# Patient Record
Sex: FEMALE | Race: ASIAN | NOT HISPANIC OR LATINO | Employment: UNEMPLOYED | URBAN - METROPOLITAN AREA
[De-identification: names, ages, dates, MRNs, and addresses within clinical notes are randomized per-mention and may not be internally consistent; named-entity substitution may affect disease eponyms.]

---

## 2017-01-05 ENCOUNTER — OFFICE VISIT (OUTPATIENT)
Dept: MEDICAL GROUP | Facility: MEDICAL CENTER | Age: 47
End: 2017-01-05
Payer: COMMERCIAL

## 2017-01-05 VITALS
HEART RATE: 67 BPM | TEMPERATURE: 97.9 F | OXYGEN SATURATION: 98 % | SYSTOLIC BLOOD PRESSURE: 100 MMHG | DIASTOLIC BLOOD PRESSURE: 64 MMHG

## 2017-01-05 DIAGNOSIS — N95.1 PERIMENOPAUSAL VASOMOTOR SYMPTOMS: ICD-10-CM

## 2017-01-05 DIAGNOSIS — N91.2 AMENORRHEA: ICD-10-CM

## 2017-01-05 DIAGNOSIS — E55.9 VITAMIN D DEFICIENCY: ICD-10-CM

## 2017-01-05 LAB
INT CON NEG: NEGATIVE
INT CON POS: POSITIVE
POC URINE PREGNANCY TEST: NORMAL

## 2017-01-05 PROCEDURE — 99213 OFFICE O/P EST LOW 20 MIN: CPT | Performed by: FAMILY MEDICINE

## 2017-01-05 PROCEDURE — 81025 URINE PREGNANCY TEST: CPT | Performed by: FAMILY MEDICINE

## 2017-01-05 RX ORDER — NORETHINDRONE ACETATE AND ETHINYL ESTRADIOL .02; 1 MG/1; MG/1
1 TABLET ORAL DAILY
Qty: 28 TAB | Refills: 6 | Status: SHIPPED | OUTPATIENT
Start: 2017-01-05 | End: 2017-04-25

## 2017-01-05 RX ORDER — ERGOCALCIFEROL 1.25 MG/1
50000 CAPSULE ORAL
Qty: 4 CAP | Refills: 6 | Status: SHIPPED | OUTPATIENT
Start: 2017-01-05 | End: 2018-02-14

## 2017-01-05 NOTE — MR AVS SNAPSHOT
Mikal Murillo   2017 1:00 PM   Office Visit   MRN: [id number]    Department:  [facility]   Dept Phone:  [phone]    Description:  Female : 1970   Provider:  Maria Alejandra Orozco M.D.           Reason for Visit     Results lab work      Allergies as of 2017     Allergen Noted Reactions    Pcn [Penicillins] 2010         You were diagnosed with     Vitamin D deficiency   [3437170]       Amenorrhea   [381697]       Perimenopausal vasomotor symptoms   [077556]         Vital Signs     Blood Pressure Pulse Temperature Oxygen Saturation Last Menstrual Period Breastfeeding?    100/64 mmHg 67 36.6 °C (97.9 °F) 98% 2016 No    Smoking Status                   Never Smoker            Basic Information     Date Of Birth Sex Race Ethnicity Preferred Language    1970 Female  Non- English      Problem List              ICD-10-CM Priority Class Noted - Resolved    Perimenopausal vasomotor symptoms N95.1   2014 - Present    Irregular periods N92.6   2014 - Present    Hypothyroid E03.9   10/14/2015 - Present    Intrinsic eczema L20.84   2016 - Present    Allergic rhinitis due to pollen J30.1   2016 - Present    Encounter for gynecological examination without abnormal finding Z01.419   2016 - Present    Screening for viral disease Z11.59   2016 - Present    Vitamin D deficiency E55.9   2016 - Present    Rapid palpitations R00.2   2016 - Present    Leukopenia D72.819   2016 - Present    Urinary frequency R35.0   2016 - Present    Amenorrhea N91.2   2017 - Present      Health Maintenance        Date Due Completion Dates    IMM DTaP/Tdap/Td Vaccine (1 - Tdap) 2009 5/15/2009    PAP SMEAR 2017, 2014 (Prv Comp)    Override on 2014: Previously completed    MAMMOGRAM 2017, 2015, 2014, 2013            Results     POCT PREGNANCY      Component Value Standard Range & Units    POC  Urine Pregnancy Test NEG Negative    Internal Control Positive Positive     Internal Control Negative Negative                         Current Immunizations     Influenza Vaccine Quad Inj (Pf) 10/4/2016    MMR Vaccine 8/17/2016, 5/15/2009    TD Vaccine 5/15/2009    Tuberculin Skin Test 8/15/2016  4:44 PM    Varicella Vaccine Live 11/23/2009      Below and/or attached are the medications your provider expects you to take. Review all of your home medications and newly ordered medications with your provider and/or pharmacist. Follow medication instructions as directed by your provider and/or pharmacist. Please keep your medication list with you and share with your provider. Update the information when medications are discontinued, doses are changed, or new medications (including over-the-counter products) are added; and carry medication information at all times in the event of emergency situations     Allergies:  PCN - (reactions not documented)               Medications  Valid as of: January 05, 2017 -  3:41 PM    Generic Name Brand Name Tablet Size Instructions for use    Ergocalciferol (Cap) DRISDOL 14650 UNITS Take 1 Cap by mouth every 7 days.        Fluticasone Propionate (Ointment) CUTIVATE 0.005 % Apply to affected area BID until clear        Norethindrone Acet-Ethinyl Est (Tab) MICROGESTIN 1/20 1-20 MG-MCG Take 1 Tab by mouth every day.        .                 Medicines prescribed today were sent to:     [facility] [address] [place] [address]), [place] - [id number] () [place] [address]    Phone: [phone] Fax: [phone]    Open 24 Hours?: No      Medication refill instructions:       If your prescription bottle indicates you have medication refills left, it is not necessary to call your provider’s office. Please contact your pharmacy and they will refill your medication.    If your prescription bottle indicates you do not have any refills left, you may request refills at any time  through one of the following ways: The online Zixi system (except Urgent Care), by calling your provider’s office, or by asking your pharmacy to contact your provider’s office with a refill request. Medication refills are processed only during regular business hours and may not be available until the next business day. Your provider may request additional information or to have a follow-up visit with you prior to refilling your medication.   *Please Note: Medication refills are assigned a new Rx number when refilled electronically. Your pharmacy may indicate that no refills were authorized even though a new prescription for the same medication is available at the pharmacy. Please request the medicine by name with the pharmacy before contacting your provider for a refill.           Zixi Access Code: Activation code not generated  Current Zixi Status: Active

## 2017-01-06 NOTE — ASSESSMENT & PLAN NOTE
Patient reports that she's having severe hot flashes that are causing her to change her night clothes. She also sometimes gets them during the day. This is creating great anxiety and she would like to consider medications. When we recently checked her hormone levels they were still in a perimenopausal range. She reports that her last period was November 12, 16. She is having unprotected sex. She denies any breast tenderness or nausea.

## 2017-01-06 NOTE — PROGRESS NOTES
Subjective:     Chief Complaint   Patient presents with   • Results     lab work       Mikal Murillo is a 46 y.o. female here today for evaluation and management of:    Perimenopausal vasomotor symptoms  Patient reports that she's having severe hot flashes that are causing her to change her night clothes. She also sometimes gets them during the day. This is creating great anxiety and she would like to consider medications. When we recently checked her hormone levels they were still in a perimenopausal range. She reports that her last period was November 12, 16. She is having unprotected sex. She denies any breast tenderness or nausea.    Vitamin D deficiency  Patient is also here to review her recent labs. She has been taking over-the-counter vitamin D 2000 units daily. She does have some fatigue.       Allergies   Allergen Reactions   • Pcn [Penicillins]        Current medicines (including changes today)  Current Outpatient Prescriptions   Medication Sig Dispense Refill   • vitamin D, Ergocalciferol, (DRISDOL) 73688 UNITS Cap capsule Take 1 Cap by mouth every 7 days. 4 Cap 6   • norethindrone-ethinyl estradiol (MICROGESTIN 1/20) 1-20 MG-MCG per tablet Take 1 Tab by mouth every day. 28 Tab 6   • flyticasone (CUTIVATE) 0.005 % ointment Apply to affected area BID until clear 30 g 0     No current facility-administered medications for this visit.       She  has a past medical history of Perimenopausal vasomotor symptoms (5/6/2014) and Hypothyroid.    Patient Active Problem List    Diagnosis Date Noted   • Amenorrhea 01/05/2017   • Rapid palpitations 12/05/2016   • Leukopenia 12/05/2016   • Urinary frequency 12/05/2016   • Vitamin D deficiency 08/17/2016   • Intrinsic eczema 07/29/2016   • Allergic rhinitis due to pollen 07/29/2016   • Encounter for gynecological examination without abnormal finding 07/29/2016   • Screening for viral disease 07/29/2016   • Hypothyroid 10/14/2015   • Irregular periods 11/11/2014   •  Perimenopausal vasomotor symptoms 05/06/2014       ROS     No nausea or vomiting.  No chest pain or palpitations.  No cough or SOB.  No pain with urination or hematuria.  No black or bloody stools.       Objective:     Blood pressure 100/64, pulse 67, temperature 36.6 °C (97.9 °F), last menstrual period 11/12/2016, SpO2 98 %, not currently breastfeeding. There is no weight on file to calculate BMI.   Physical Exam:  Well developed, well nourished.  Alert, oriented in no acute distress.  Eye contact is good, speech goal directed, affect calm  Eyes: conjunctiva non-injected, sclera non-icteric.  Ears: Pinna normal. TM pearly gray.   Nose: Nares are patent.  Normal mucosa  Mouth: Oral mucous membranes pink and moist with no lesions.  Neck Supple.  No adenopathy or masses in the neck or supraclavicular regions. No thyromegaly  Lungs: clear to auscultation bilaterally with good excursion. No wheezes or rhonchi  CV: regular rate and rhythm. No murmur          Assessment and Plan:   The following treatment plan was discussed    1. Vitamin D deficiency  Start vitamin D 50,000 units weekly. Recheck labs in 6 months.  - vitamin D, Ergocalciferol, (DRISDOL) 07763 UNITS Cap capsule; Take 1 Cap by mouth every 7 days.  Dispense: 4 Cap; Refill: 6    2. Amenorrhea  Patient has skipped a period this may be perimenopause. We will get a pregnancy test to rule out that as an option.  - POCT PREGNANCY    3. Perimenopausal vasomotor symptoms  As patient's symptoms are causing her great distress and lowering her quality of life we will start her on low-dose oral contraceptives until she is fully through menopause.  - norethindrone-ethinyl estradiol (MICROGESTIN 1/20) 1-20 MG-MCG per tablet; Take 1 Tab by mouth every day.  Dispense: 28 Tab; Refill: 6    Any change or worsening of signs or symptoms, patient encouraged to follow-up or report to the emergency room for further evaluation. Patient understands and agrees.    Followup: Return in  about 6 months (around 7/5/2017).

## 2017-01-06 NOTE — ASSESSMENT & PLAN NOTE
Patient is also here to review her recent labs. She has been taking over-the-counter vitamin D 2000 units daily. She does have some fatigue.

## 2017-03-02 ENCOUNTER — OFFICE VISIT (OUTPATIENT)
Dept: MEDICAL GROUP | Facility: MEDICAL CENTER | Age: 47
End: 2017-03-02
Payer: COMMERCIAL

## 2017-03-02 VITALS
DIASTOLIC BLOOD PRESSURE: 64 MMHG | BODY MASS INDEX: 19.88 KG/M2 | HEIGHT: 62 IN | TEMPERATURE: 98.3 F | HEART RATE: 71 BPM | WEIGHT: 108 LBS | SYSTOLIC BLOOD PRESSURE: 98 MMHG | OXYGEN SATURATION: 98 %

## 2017-03-02 DIAGNOSIS — N95.1 PERIMENOPAUSAL VASOMOTOR SYMPTOMS: ICD-10-CM

## 2017-03-02 DIAGNOSIS — M79.644 FINGER PAIN, RIGHT: ICD-10-CM

## 2017-03-02 DIAGNOSIS — L20.84 INTRINSIC ECZEMA: ICD-10-CM

## 2017-03-02 PROCEDURE — 99213 OFFICE O/P EST LOW 20 MIN: CPT | Performed by: FAMILY MEDICINE

## 2017-03-02 RX ORDER — TRIAMCINOLONE ACETONIDE 40 MG/ML
40 INJECTION, SUSPENSION INTRA-ARTICULAR; INTRAMUSCULAR ONCE
Qty: 1 ML | Refills: 0 | OUTPATIENT
Start: 2017-03-02 | End: 2017-03-03

## 2017-03-02 RX ADMIN — TRIAMCINOLONE ACETONIDE 40 MG: 40 INJECTION, SUSPENSION INTRA-ARTICULAR; INTRAMUSCULAR at 09:43

## 2017-03-02 ASSESSMENT — PATIENT HEALTH QUESTIONNAIRE - PHQ9: CLINICAL INTERPRETATION OF PHQ2 SCORE: 0

## 2017-03-02 NOTE — MR AVS SNAPSHOT
"Mikal Murillo   3/2/2017 9:20 AM   Office Visit   MRN: [id number]    Department:  [facility]   Dept Phone:  [phone]    Description:  Female : 1970   Provider:  Maria Alejandra Orozco M.D.           Reason for Visit     Medication Management BCP    Finger Pain R hand middle finger    Rash on legs      Allergies as of 3/2/2017     Allergen Noted Reactions    Pcn [Penicillins] 2010         You were diagnosed with     Perimenopausal vasomotor symptoms   [941351]       Finger pain, right   [535818]       Intrinsic eczema   [2223241]         Vital Signs     Blood Pressure Pulse Temperature Height Weight Body Mass Index    98/64 mmHg 71 36.8 °C (98.3 °F) 1.577 m (5' 2.09\") 48.988 kg (108 lb) 19.70 kg/m2    Oxygen Saturation Last Menstrual Period Breastfeeding? Smoking Status          98% 2017 No Never Smoker         Basic Information     Date Of Birth Sex Race Ethnicity Preferred Language    1970 Female  Non- English      Problem List              ICD-10-CM Priority Class Noted - Resolved    Perimenopausal vasomotor symptoms N95.1   2014 - Present    Irregular periods N92.6   2014 - Present    Hypothyroid E03.9   10/14/2015 - Present    Intrinsic eczema L20.84   2016 - Present    Allergic rhinitis due to pollen J30.1   2016 - Present    Encounter for gynecological examination without abnormal finding Z01.419   2016 - Present    Screening for viral disease Z11.59   2016 - Present    Vitamin D deficiency E55.9   2016 - Present    Rapid palpitations R00.2   2016 - Present    Leukopenia D72.819   2016 - Present    Urinary frequency R35.0   2016 - Present    Amenorrhea N91.2   2017 - Present    Finger pain, right M79.644   3/2/2017 - Present      Health Maintenance        Date Due Completion Dates    IMM DTaP/Tdap/Td Vaccine (1 - Tdap) 2009 5/15/2009    PAP SMEAR 2017, 2014 (Prv Comp)    Override on " 5/6/2014: Previously completed    MAMMOGRAM 9/29/2017 9/29/2016, 9/28/2015, 9/23/2014, 8/30/2013            Current Immunizations     Influenza Vaccine Quad Inj (Pf) 10/4/2016    MMR Vaccine 8/17/2016, 5/15/2009    TD Vaccine 5/15/2009    Tuberculin Skin Test 8/15/2016  4:44 PM    Varicella Vaccine Live 11/23/2009      Below and/or attached are the medications your provider expects you to take. Review all of your home medications and newly ordered medications with your provider and/or pharmacist. Follow medication instructions as directed by your provider and/or pharmacist. Please keep your medication list with you and share with your provider. Update the information when medications are discontinued, doses are changed, or new medications (including over-the-counter products) are added; and carry medication information at all times in the event of emergency situations     Allergies:  PCN - (reactions not documented)               Medications  Valid as of: March 02, 2017 - 11:27 AM    Generic Name Brand Name Tablet Size Instructions for use    Ergocalciferol (Cap) DRISDOL 60320 UNITS Take 1 Cap by mouth every 7 days.        Fluticasone Propionate (Ointment) CUTIVATE 0.005 % Apply to affected area BID until clear        Norethindrone Acet-Ethinyl Est (Tab) MICROGESTIN 1/20 1-20 MG-MCG Take 1 Tab by mouth every day.        Triamcinolone Acetonide (Suspension) KENALOG-40 40 MG/ML 1 mL by Intramuscular route Once for 1 dose.        .                 Medicines prescribed today were sent to:     [facility] [address] [place] [address]), [place] - [address]    [address] () [place] [address]    Phone: [phone] Fax: [phone]    Open 24 Hours?: No      Medication refill instructions:       If your prescription bottle indicates you have medication refills left, it is not necessary to call your provider’s office. Please contact your pharmacy and they will refill your medication.    If your prescription bottle  indicates you do not have any refills left, you may request refills at any time through one of the following ways: The online Celect system (except Urgent Care), by calling your provider’s office, or by asking your pharmacy to contact your provider’s office with a refill request. Medication refills are processed only during regular business hours and may not be available until the next business day. Your provider may request additional information or to have a follow-up visit with you prior to refilling your medication.   *Please Note: Medication refills are assigned a new Rx number when refilled electronically. Your pharmacy may indicate that no refills were authorized even though a new prescription for the same medication is available at the pharmacy. Please request the medicine by name with the pharmacy before contacting your provider for a refill.           Celect Access Code: Activation code not generated  Current Celect Status: Active

## 2017-03-02 NOTE — ASSESSMENT & PLAN NOTE
She had spotting with the Microgestin, nipple tenderness and she's gaining weight.  She stopped them 2 weeks ago.  It was helping with the hot flashes.  She did not notice a difference with the estroven.

## 2017-03-02 NOTE — ASSESSMENT & PLAN NOTE
Two weeks ago she had severe pain in the 3rd finger of the right hand and it be came red.  It is feeling better now.  She denies any trauma.  No numbness or tingling.

## 2017-03-03 RX ORDER — TRIAMCINOLONE ACETONIDE 40 MG/ML
40 INJECTION, SUSPENSION INTRA-ARTICULAR; INTRAMUSCULAR ONCE
Status: COMPLETED | OUTPATIENT
Start: 2017-03-03 | End: 2017-03-02

## 2017-03-15 NOTE — PROGRESS NOTES
Subjective:     Chief Complaint   Patient presents with   • Medication Management     BCP   • Finger Pain     R hand middle finger   • Rash     on legs       Mikal Murillo is a 46 y.o. female here today for evaluation and management of:    Perimenopausal vasomotor symptoms  She had spotting with the Microgestin, nipple tenderness and she's gaining weight.  She stopped them 2 weeks ago.  It was helping with the hot flashes.  She did not notice a difference with the estroven.      Finger pain, right  Two weeks ago she had severe pain in the 3rd finger of the right hand and it be came red.  It is feeling better now.  She denies any trauma.  No numbness or tingling.    Intrinsic eczema  Patient has eczema on her arms and trunk that continue to itch and bother her.         Allergies   Allergen Reactions   • Pcn [Penicillins]        Current medicines (including changes today)  Current Outpatient Prescriptions   Medication Sig Dispense Refill   • vitamin D, Ergocalciferol, (DRISDOL) 29318 UNITS Cap capsule Take 1 Cap by mouth every 7 days. 4 Cap 6   • norethindrone-ethinyl estradiol (MICROGESTIN 1/20) 1-20 MG-MCG per tablet Take 1 Tab by mouth every day. 28 Tab 6   • flyticasone (CUTIVATE) 0.005 % ointment Apply to affected area BID until clear 30 g 0     No current facility-administered medications for this visit.       She  has a past medical history of Perimenopausal vasomotor symptoms (5/6/2014) and Hypothyroid.    Patient Active Problem List    Diagnosis Date Noted   • Finger pain, right 03/02/2017   • Amenorrhea 01/05/2017   • Rapid palpitations 12/05/2016   • Leukopenia 12/05/2016   • Urinary frequency 12/05/2016   • Vitamin D deficiency 08/17/2016   • Intrinsic eczema 07/29/2016   • Allergic rhinitis due to pollen 07/29/2016   • Encounter for gynecological examination without abnormal finding 07/29/2016   • Screening for viral disease 07/29/2016   • Hypothyroid 10/14/2015   • Irregular periods 11/11/2014   •  "Perimenopausal vasomotor symptoms 05/06/2014       ROS   No fever or chills.  No nausea or vomiting.  No chest pain or palpitations.  No cough or SOB.  No pain with urination or hematuria.  No black or bloody stools.       Objective:     Blood pressure 98/64, pulse 71, temperature 36.8 °C (98.3 °F), height 1.577 m (5' 2.09\"), weight 48.988 kg (108 lb), last menstrual period 02/16/2017, SpO2 98 %, not currently breastfeeding. Body mass index is 19.7 kg/(m^2).   Physical Exam:  Well developed, well nourished.  Alert, oriented in no acute distress.  Eye contact is good, speech goal directed, affect calm  Eyes: conjunctiva non-injected, sclera non-icteric.  Ears: Pinna normal. TM pearly gray.   Nose: Nares are patent.  Normal mucosa  Mouth: Oral mucous membranes pink and moist with no lesions.  Neck Supple.  No adenopathy or masses in the neck or supraclavicular regions. No thyromegaly  Lungs: clear to auscultation bilaterally with good excursion. No wheezes or rhonchi  CV: regular rate and rhythm. No murmur  Abdomen: soft, nontender, no masses or organomegaly.  No rebound or gaurding  Ext: no edema, color normal, vascularity normal, temperature normal  Skin- erythematous scaling lesion in patches      Assessment and Plan:   The following treatment plan was discussed    1. Perimenopausal vasomotor symptoms  Consider trial of estrogen therapy versus Effexor    2. Finger pain, right  Possible bursitis.  Consider referral    3. Intrinsic eczema    - triamcinolone acetonide (KENALOG-40) injection 40 mg; 1 mL by Intramuscular route Once.    Any change or worsening of signs or symptoms, patient encouraged to follow-up or report to the emergency room for further evaluation. Patient understands and agrees.    Followup: Return if symptoms worsen or fail to improve.             "

## 2017-04-25 ENCOUNTER — OFFICE VISIT (OUTPATIENT)
Dept: MEDICAL GROUP | Facility: MEDICAL CENTER | Age: 47
End: 2017-04-25
Payer: COMMERCIAL

## 2017-04-25 VITALS
TEMPERATURE: 97.5 F | BODY MASS INDEX: 18.61 KG/M2 | WEIGHT: 105 LBS | SYSTOLIC BLOOD PRESSURE: 100 MMHG | HEIGHT: 63 IN | DIASTOLIC BLOOD PRESSURE: 72 MMHG | OXYGEN SATURATION: 96 % | HEART RATE: 75 BPM

## 2017-04-25 DIAGNOSIS — N92.6 IRREGULAR MENSES: ICD-10-CM

## 2017-04-25 PROCEDURE — 99213 OFFICE O/P EST LOW 20 MIN: CPT | Performed by: FAMILY MEDICINE

## 2017-04-25 NOTE — PROGRESS NOTES
Subjective:     Chief Complaint   Patient presents with   • Menstrual Problem     for a couple of months       Mikal Murillo is a 47 y.o. female here today for evaluation and management of:    Irregular menses  Had menses 2/16, 3/10, 3/19. 4/10, 4/23 - Bleed 5-7 days and these were heavier than normal.  Only did one month of the Microgestin in January.  Not further hot flashes since that time. Patient is very anxious about her irregular periods. We have had multiple discussions about irregularity around the time of menopause. She denies any increased cramping. No increased breast tenderness, nipple discharge or masses.         Allergies   Allergen Reactions   • Pcn [Penicillins]        Current medicines (including changes today)  Current Outpatient Prescriptions   Medication Sig Dispense Refill   • vitamin D, Ergocalciferol, (DRISDOL) 91240 UNITS Cap capsule Take 1 Cap by mouth every 7 days. 4 Cap 6   • flyticasone (CUTIVATE) 0.005 % ointment Apply to affected area BID until clear 30 g 0     No current facility-administered medications for this visit.       She  has a past medical history of Perimenopausal vasomotor symptoms (5/6/2014) and Hypothyroid.    Patient Active Problem List    Diagnosis Date Noted   • Irregular menses 04/25/2017   • Finger pain, right 03/02/2017   • Amenorrhea 01/05/2017   • Rapid palpitations 12/05/2016   • Leukopenia 12/05/2016   • Urinary frequency 12/05/2016   • Vitamin D deficiency 08/17/2016   • Intrinsic eczema 07/29/2016   • Allergic rhinitis due to pollen 07/29/2016   • Encounter for gynecological examination without abnormal finding 07/29/2016   • Screening for viral disease 07/29/2016   • Hypothyroid 10/14/2015   • Irregular periods 11/11/2014   • Perimenopausal vasomotor symptoms 05/06/2014       ROS   No fever or chills.  No nausea or vomiting.  No chest pain or palpitations.  No cough or SOB.  No pain with urination or hematuria.  No black or bloody stools.       Objective:  "    Blood pressure 100/72, pulse 75, temperature 36.4 °C (97.5 °F), height 1.6 m (5' 3\"), weight 47.628 kg (105 lb), last menstrual period 04/23/2017, SpO2 96 %, not currently breastfeeding. Body mass index is 18.6 kg/(m^2).   Physical Exam:  Well developed, well nourished.  Alert, oriented in no acute distress.  Eye contact is good, speech goal directed, affect calm  Neck Supple.  No adenopathy or masses in the neck or supraclavicular regions. No thyromegaly  Lungs: clear to auscultation bilaterally with good excursion. No wheezes or rhonchi  CV: regular rate and rhythm. No murmur      Assessment and Plan:   The following treatment plan was discussed    1. Irregular menses  Given patient's anxiety over her gynecologic health will go ahead and refer her to gynecology for further evaluation and treatment. Patient agrees with this plan.  - REFERRAL TO GYNECOLOGY    Any change or worsening of signs or symptoms, patient encouraged to follow-up or report to the emergency room for further evaluation. Patient understands and agrees.    Followup: Return if symptoms worsen or fail to improve.             "

## 2017-04-25 NOTE — ASSESSMENT & PLAN NOTE
Had menses 2/16, 3/10, 3/19. 4/10, 4/23 - Bleed 5-7 days and these were heavier than normal.  Only did one month of the Microgestin in January.  Not further hot flashes since that time. Patient is very anxious about her irregular periods. We have had multiple discussions about irregularity around the time of menopause. She denies any increased cramping. No increased breast tenderness, nipple discharge or masses.

## 2017-04-25 NOTE — MR AVS SNAPSHOT
"Mikal Murillo   2017 8:40 AM   Office Visit   MRN: [id number]    Department:  [facility]   Dept Phone:  [phone]    Description:  Female : 1970   Provider:  Maria Alejandra Orozco M.D.           Reason for Visit     Menstrual Problem for a couple of months      Allergies as of 2017     Allergen Noted Reactions    Pcn [Penicillins] 2010         You were diagnosed with     Irregular menses   [917138]         Vital Signs     Blood Pressure Pulse Temperature Height Weight Body Mass Index    100/72 mmHg 75 36.4 °C (97.5 °F) 1.6 m (5' 3\") 47.628 kg (105 lb) 18.60 kg/m2    Oxygen Saturation Last Menstrual Period Breastfeeding? Smoking Status          96% 2017 No Never Smoker         Basic Information     Date Of Birth Sex Race Ethnicity Preferred Language    1970 Female  Non- English      Problem List              ICD-10-CM Priority Class Noted - Resolved    Perimenopausal vasomotor symptoms N95.1   2014 - Present    Irregular periods N92.6   2014 - Present    Hypothyroid E03.9   10/14/2015 - Present    Intrinsic eczema L20.84   2016 - Present    Allergic rhinitis due to pollen J30.1   2016 - Present    Encounter for gynecological examination without abnormal finding Z01.419   2016 - Present    Screening for viral disease Z11.59   2016 - Present    Vitamin D deficiency E55.9   2016 - Present    Rapid palpitations R00.2   2016 - Present    Leukopenia D72.819   2016 - Present    Urinary frequency R35.0   2016 - Present    Amenorrhea N91.2   2017 - Present    Finger pain, right M79.644   3/2/2017 - Present    Irregular menses N92.6   2017 - Present      Health Maintenance        Date Due Completion Dates    IMM DTaP/Tdap/Td Vaccine (1 - Tdap) 2009 5/15/2009    PAP SMEAR 2017, 2014 (Prv Comp)    Override on 2014: Previously completed    MAMMOGRAM 2017, 2015, " 9/23/2014, 8/30/2013            Current Immunizations     Influenza Vaccine Quad Inj (Pf) 10/4/2016    MMR Vaccine 8/17/2016, 5/15/2009    TD Vaccine 5/15/2009    Tuberculin Skin Test 8/15/2016  4:44 PM    Varicella Vaccine Live 11/23/2009      Below and/or attached are the medications your provider expects you to take. Review all of your home medications and newly ordered medications with your provider and/or pharmacist. Follow medication instructions as directed by your provider and/or pharmacist. Please keep your medication list with you and share with your provider. Update the information when medications are discontinued, doses are changed, or new medications (including over-the-counter products) are added; and carry medication information at all times in the event of emergency situations     Allergies:  PCN - (reactions not documented)               Medications  Valid as of: April 25, 2017 -  9:25 AM    Generic Name Brand Name Tablet Size Instructions for use    Ergocalciferol (Cap) DRISDOL 78655 UNITS Take 1 Cap by mouth every 7 days.        Fluticasone Propionate (Ointment) CUTIVATE 0.005 % Apply to affected area BID until clear        .                 Medicines prescribed today were sent to:     [facility] [address] [place] [address] (), [place] - [address]    [address] () [place] [address]    Phone: [phone] Fax: [phone]    Open 24 Hours?: No      Medication refill instructions:       If your prescription bottle indicates you have medication refills left, it is not necessary to call your provider’s office. Please contact your pharmacy and they will refill your medication.    If your prescription bottle indicates you do not have any refills left, you may request refills at any time through one of the following ways: The online [employer] system (except Urgent Care), by calling your provider’s office, or by asking your pharmacy to contact your provider’s office with a refill request.  Medication refills are processed only during regular business hours and may not be available until the next business day. Your provider may request additional information or to have a follow-up visit with you prior to refilling your medication.   *Please Note: Medication refills are assigned a new Rx number when refilled electronically. Your pharmacy may indicate that no refills were authorized even though a new prescription for the same medication is available at the pharmacy. Please request the medicine by name with the pharmacy before contacting your provider for a refill.        Referral     A referral request has been sent to our patient care coordination department. Please allow 3-5 business days for us to process this request and contact you either by phone or mail. If you do not hear from us by the 5th business day, please call us at [phone].           [employer] Access Code: Activation code not generated  Current [employer] Status: Active

## 2017-06-01 ENCOUNTER — OFFICE VISIT (OUTPATIENT)
Dept: MEDICAL GROUP | Facility: MEDICAL CENTER | Age: 47
End: 2017-06-01
Payer: COMMERCIAL

## 2017-06-01 VITALS
SYSTOLIC BLOOD PRESSURE: 100 MMHG | TEMPERATURE: 97.7 F | BODY MASS INDEX: 18.07 KG/M2 | HEART RATE: 70 BPM | DIASTOLIC BLOOD PRESSURE: 62 MMHG | OXYGEN SATURATION: 97 % | HEIGHT: 63 IN | WEIGHT: 102 LBS

## 2017-06-01 DIAGNOSIS — N92.6 IRREGULAR MENSES: ICD-10-CM

## 2017-06-01 DIAGNOSIS — R59.0 INGUINAL LYMPHADENOPATHY: ICD-10-CM

## 2017-06-01 DIAGNOSIS — B00.9 HERPES SIMPLEX INFECTION: ICD-10-CM

## 2017-06-01 PROBLEM — B00.4 HERPES SIMIAE INFECTION: Status: ACTIVE | Noted: 2017-06-01

## 2017-06-01 PROCEDURE — 99214 OFFICE O/P EST MOD 30 MIN: CPT | Performed by: FAMILY MEDICINE

## 2017-06-01 RX ORDER — VALACYCLOVIR HYDROCHLORIDE 1 G/1
1000 TABLET, FILM COATED ORAL 2 TIMES DAILY
Qty: 14 TAB | Refills: 3 | Status: SHIPPED | OUTPATIENT
Start: 2017-06-01 | End: 2018-02-14

## 2017-06-01 NOTE — PATIENT INSTRUCTIONS
Herpes Labialis  You have a fever blister or cold sore (herpes labialis). These painful, grouped sores are caused by one of the herpes viruses (HSV1 most commonly). They are usually found around the lips and mouth, but the same infection can also affect other areas on the face such as the nose and eyes. Herpes infections take about 10 days to heal. They often occur again and again in the same spot. Other symptoms may include numbness and tingling in the involved skin, achiness, fever, and swollen glands in the neck. Colds, emotional stress, injuries, or excess sunlight exposure all seem to make herpes reappear. Herpes lip infections are contagious. Direct contact with these sores can spread the infection. It can also be spread to other parts of your own body.  TREATMENT   Herpes labialis is usually self-limited and resolves within 1 week. To reduce pain and swelling, apply ice packs frequently to the sores or suck on popsicles or frozen juice bars. Antiviral medicine may be used by mouth to shorten the duration of the breakout. Avoid spreading the infection by washing your hands often. Be careful not to touch your eyes or genital areas after handling the infected blisters. Do not kiss or have other intimate contact with others. After the blisters are completely healed you may resume contact. Use sunscreen to lessen recurrences.   If this is your first infection with herpes, or if you have a severe or repeated infections, your caregiver may prescribe one of the anti-viral drugs to speed up the healing. If you have sun-related flare-ups despite the use of sunscreen, starting oral anti-viral medicine before a prolonged exposure (going skiing or to the beach) can prevent most episodes.   SEEK IMMEDIATE MEDICAL CARE IF:  · You develop a headache, sleepiness, high fever, vomiting, or severe weakness.  · You have eye irritation, pain, blurred vision or redness.  · You develop a prolonged infection not getting better in 10  days.     This information is not intended to replace advice given to you by your health care provider. Make sure you discuss any questions you have with your health care provider.     Document Released: 12/18/2006 Document Revised: 03/11/2013 Document Reviewed: 10/22/2010  [employer] Interactive Patient Education ©2016 [employer]vier Inc.

## 2017-06-01 NOTE — ASSESSMENT & PLAN NOTE
Patient had a normal period on 4/23/17 but has not bled since it.  She's scheduled to see the GYN in July.

## 2017-06-01 NOTE — MR AVS SNAPSHOT
"Mikal Murillo   2017 9:00 AM   Office Visit   MRN: [id number]    Department:  [facility]   Dept Phone:  [phone]    Description:  Female : 1970   Provider:  Maria Alejandra Orozco M.D.           Reason for Visit     Skin Lesion for 2 days    Groin Pain left side      Allergies as of 2017     Allergen Noted Reactions    Pcn [Penicillins] 2010         You were diagnosed with     Herpes simiae infection   [031047]       Inguinal lymphadenopathy   [[id number]]       Irregular menses   [504236]         Vital Signs     Blood Pressure Pulse Temperature Height Weight Body Mass Index    100/62 mmHg 70 36.5 °C (97.7 °F) 1.6 m (5' 2.99\") 46.267 kg (102 lb) 18.07 kg/m2    Oxygen Saturation Last Menstrual Period Breastfeeding? Smoking Status          97% 2017 No Never Smoker         Basic Information     Date Of Birth Sex Race Ethnicity Preferred Language    1970 Female  Non- English      Problem List              ICD-10-CM Priority Class Noted - Resolved    Perimenopausal vasomotor symptoms N95.1   2014 - Present    Hypothyroid E03.9   10/14/2015 - Present    Intrinsic eczema L20.84   2016 - Present    Allergic rhinitis due to pollen J30.1   2016 - Present    Encounter for gynecological examination without abnormal finding Z01.419   2016 - Present    Screening for viral disease Z11.59   2016 - Present    Vitamin D deficiency E55.9   2016 - Present    Rapid palpitations R00.2   2016 - Present    Leukopenia D72.819   2016 - Present    Urinary frequency R35.0   2016 - Present    Amenorrhea N91.2   2017 - Present    Finger pain, right M79.644   3/2/2017 - Present    Irregular menses N92.6   2017 - Present    Herpes simiae infection B00.4   2017 - Present    Inguinal lymphadenopathy R59.0   2017 - Present      Health Maintenance        Date Due Completion Dates    IMM DTaP/Tdap/Td Vaccine (1 - Tdap) 2009 5/15/2009   " PAP SMEAR 5/6/2017 5/6/2014, 5/6/2014 (Prv Comp)    Override on 5/6/2014: Previously completed    MAMMOGRAM 9/29/2017 9/29/2016, 9/28/2015, 9/23/2014, 8/30/2013            Current Immunizations     Influenza Vaccine Quad Inj (Pf) 10/4/2016    MMR Vaccine 8/17/2016, 5/15/2009    TD Vaccine 5/15/2009    Tuberculin Skin Test 8/15/2016  4:44 PM    Varicella Vaccine Live 11/23/2009      Below and/or attached are the medications your provider expects you to take. Review all of your home medications and newly ordered medications with your provider and/or pharmacist. Follow medication instructions as directed by your provider and/or pharmacist. Please keep your medication list with you and share with your provider. Update the information when medications are discontinued, doses are changed, or new medications (including over-the-counter products) are added; and carry medication information at all times in the event of emergency situations     Allergies:  PCN - (reactions not documented)               Medications  Valid as of: June 01, 2017 -  9:10 AM    Generic Name Brand Name Tablet Size Instructions for use    Ergocalciferol (Cap) DRISDOL 01172 UNITS Take 1 Cap by mouth every 7 days.        Fluticasone Propionate (Ointment) CUTIVATE 0.005 % Apply to affected area BID until clear        ValACYclovir HCl (Tab) VALTREX 1 GM Take 1 Tab by mouth 2 times a day.        .                 Medicines prescribed today were sent to:     [facility] [address] [place] [address]), [place] - [address]    [address] () [place] [address]    Phone: [phone] Fax: [phone]    Open 24 Hours?: No      Medication refill instructions:       If your prescription bottle indicates you have medication refills left, it is not necessary to call your provider’s office. Please contact your pharmacy and they will refill your medication.    If your prescription bottle indicates you do not have any refills left, you may request refills at any  time through one of the following ways: The online [employer] system (except Urgent Care), by calling your provider’s office, or by asking your pharmacy to contact your provider’s office with a refill request. Medication refills are processed only during regular business hours and may not be available until the next business day. Your provider may request additional information or to have a follow-up visit with you prior to refilling your medication.   *Please Note: Medication refills are assigned a new Rx number when refilled electronically. Your pharmacy may indicate that no refills were authorized even though a new prescription for the same medication is available at the pharmacy. Please request the medicine by name with the pharmacy before contacting your provider for a refill.        Instructions    Herpes Labialis  You have a fever blister or cold sore (herpes labialis). These painful, grouped sores are caused by one of the herpes viruses (HSV1 most commonly). They are usually found around the lips and mouth, but the same infection can also affect other areas on the face such as the nose and eyes. Herpes infections take about 10 days to heal. They often occur again and again in the same spot. Other symptoms may include numbness and tingling in the involved skin, achiness, fever, and swollen glands in the neck. Colds, emotional stress, injuries, or excess sunlight exposure all seem to make herpes reappear. Herpes lip infections are contagious. Direct contact with these sores can spread the infection. It can also be spread to other parts of your own body.  TREATMENT   Herpes labialis is usually self-limited and resolves within 1 week. To reduce pain and swelling, apply ice packs frequently to the sores or suck on popsicles or frozen juice bars. Antiviral medicine may be used by mouth to shorten the duration of the breakout. Avoid spreading the infection by washing your hands often. Be careful not to touch your eyes  or genital areas after handling the infected blisters. Do not kiss or have other intimate contact with others. After the blisters are completely healed you may resume contact. Use sunscreen to lessen recurrences.   If this is your first infection with herpes, or if you have a severe or repeated infections, your caregiver may prescribe one of the anti-viral drugs to speed up the healing. If you have sun-related flare-ups despite the use of sunscreen, starting oral anti-viral medicine before a prolonged exposure (going skiing or to the beach) can prevent most episodes.   SEEK IMMEDIATE MEDICAL CARE IF:  · You develop a headache, sleepiness, high fever, vomiting, or severe weakness.  · You have eye irritation, pain, blurred vision or redness.  · You develop a prolonged infection not getting better in 10 days.     This information is not intended to replace advice given to you by your health care provider. Make sure you discuss any questions you have with your health care provider.     Document Released: 12/18/2006 Document Revised: 03/11/2013 Document Reviewed: 10/22/2010  [employer] Interactive Patient Education ©2016 Elsevier Inc.              [employer] Access Code: Activation code not generated  Current [employer] Status: Active

## 2017-06-08 PROBLEM — B00.9 HERPES SIMPLEX INFECTION: Status: ACTIVE | Noted: 2017-06-01

## 2017-06-08 NOTE — ASSESSMENT & PLAN NOTE
Patient complains of a sore on her buttocks. She first noticed it one day ago. It is painful to touch. She's never had something like this before. She also noticed some swelling in her inguinal area since this sore appeared. She has had cold sores in the past but denies any genital herpes.

## 2017-06-08 NOTE — PROGRESS NOTES
Subjective:     Chief Complaint   Patient presents with   • Skin Lesion     for 2 days   • Groin Pain     left side       Mikal Murillo is a 47 y.o. female here today for evaluation and management of:    Irregular menses  Patient had a normal period on 4/23/17 but has not bled since it.  She's scheduled to see the GYN in July.    Herpes simplex infection  Patient complains of a sore on her buttocks. She first noticed it one day ago. It is painful to touch. She's never had something like this before. She also noticed some swelling in her inguinal area since this sore appeared. She has had cold sores in the past but denies any genital herpes.       Allergies   Allergen Reactions   • Pcn [Penicillins]        Current medicines (including changes today)  Current Outpatient Prescriptions   Medication Sig Dispense Refill   • valacyclovir (VALTREX) 1 GM Tab Take 1 Tab by mouth 2 times a day. 14 Tab 3   • vitamin D, Ergocalciferol, (DRISDOL) 34499 UNITS Cap capsule Take 1 Cap by mouth every 7 days. 4 Cap 6   • flyticasone (CUTIVATE) 0.005 % ointment Apply to affected area BID until clear 30 g 0     No current facility-administered medications for this visit.       She  has a past medical history of Perimenopausal vasomotor symptoms (5/6/2014) and Hypothyroid.    Patient Active Problem List    Diagnosis Date Noted   • Herpes simplex infection 06/01/2017   • Inguinal lymphadenopathy 06/01/2017   • Irregular menses 04/25/2017   • Finger pain, right 03/02/2017   • Amenorrhea 01/05/2017   • Rapid palpitations 12/05/2016   • Leukopenia 12/05/2016   • Urinary frequency 12/05/2016   • Vitamin D deficiency 08/17/2016   • Intrinsic eczema 07/29/2016   • Allergic rhinitis due to pollen 07/29/2016   • Encounter for gynecological examination without abnormal finding 07/29/2016   • Screening for viral disease 07/29/2016   • Hypothyroid 10/14/2015   • Perimenopausal vasomotor symptoms 05/06/2014       ROS   No fever or chills.  No nausea or  "vomiting.  No chest pain or palpitations.  No cough or SOB.  No pain with urination or hematuria.  No black or bloody stools.       Objective:     Blood pressure 100/62, pulse 70, temperature 36.5 °C (97.7 °F), height 1.6 m (5' 2.99\"), weight 46.267 kg (102 lb), last menstrual period 04/23/2017, SpO2 97 %, not currently breastfeeding. Body mass index is 18.07 kg/(m^2).   Physical Exam:  Well developed, well nourished.  Alert, oriented in no acute distress.  Eye contact is good, speech goal directed, affect calm  Eyes: conjunctiva non-injected, sclera non-icteric.  Neck Supple.  No adenopathy or masses in the neck or supraclavicular regions. No thyromegaly  Lungs: clear to auscultation bilaterally with good excursion. No wheezes or rhonchi  CV: regular rate and rhythm. No murmur  Abdomen: soft, nontender, no masses or organomegaly.  No rebound or guarding. There is bilateral inguinal lymphadenopathy that's tender to palpation.    Buttocks vesicular lesions and a patch above the anus with surrounding erythema and mild induration. No lymphatic streaking          Assessment and Plan:   The following treatment plan was discussed    1. Herpes simplex infection  Valtrex 1 g twice a day for 7 days. Disease prevention discussed. Call if not improving    2. Inguinal lymphadenopathy  Secondary to #1. Patient reassured    3. Irregular menses  Follow-up with gynecologist as scheduled.    Any change or worsening of signs or symptoms, patient encouraged to follow-up or report to the emergency room for further evaluation. Patient understands and agrees.    Followup: Return if symptoms worsen or fail to improve.             "

## 2017-07-27 DIAGNOSIS — E55.9 VITAMIN D DEFICIENCY: ICD-10-CM

## 2017-07-27 RX ORDER — ERGOCALCIFEROL 1.25 MG/1
CAPSULE ORAL
Refills: 0 | OUTPATIENT
Start: 2017-07-27

## 2017-10-02 ENCOUNTER — HOSPITAL ENCOUNTER (OUTPATIENT)
Dept: RADIOLOGY | Facility: MEDICAL CENTER | Age: 47
End: 2017-10-02
Attending: FAMILY MEDICINE
Payer: COMMERCIAL

## 2017-10-02 DIAGNOSIS — Z12.31 SCREENING MAMMOGRAM, ENCOUNTER FOR: ICD-10-CM

## 2017-10-02 PROCEDURE — G0202 SCR MAMMO BI INCL CAD: HCPCS

## 2017-11-14 ENCOUNTER — OFFICE VISIT (OUTPATIENT)
Dept: MEDICAL GROUP | Facility: MEDICAL CENTER | Age: 47
End: 2017-11-14
Payer: COMMERCIAL

## 2017-11-14 VITALS
DIASTOLIC BLOOD PRESSURE: 72 MMHG | RESPIRATION RATE: 14 BRPM | OXYGEN SATURATION: 96 % | HEIGHT: 63 IN | BODY MASS INDEX: 18.07 KG/M2 | WEIGHT: 102 LBS | SYSTOLIC BLOOD PRESSURE: 100 MMHG | TEMPERATURE: 97.2 F | HEART RATE: 68 BPM

## 2017-11-14 DIAGNOSIS — R05.9 COUGH: ICD-10-CM

## 2017-11-14 PROCEDURE — 99214 OFFICE O/P EST MOD 30 MIN: CPT | Performed by: FAMILY MEDICINE

## 2017-11-14 RX ORDER — OMEPRAZOLE 20 MG/1
20 CAPSULE, DELAYED RELEASE ORAL 2 TIMES DAILY
Qty: 60 CAP | Refills: 0 | Status: SHIPPED | OUTPATIENT
Start: 2017-11-14 | End: 2018-02-14

## 2017-11-14 NOTE — ASSESSMENT & PLAN NOTE
Patient with one-week history of nonproductive cough--but may have noticed this symptom in the past--is concerned that this could be something serious, particularly as she and her  will be travelling to [place] and [place] within the next two days.    Patient reviewed that this could be a chronic cough, and she does state that she has indigestion/heartburn at times. Patient interviewed that she was born in [place], was inoculated with likely BCG vaccination. Therefore, while this could be tuberculosis, is unlikely as patient is otherwise asymptomatic from tuberculosis standpoint (see review of systems as below).    ROS is positive for mild chest congestion/abdominal fullness, otherwise is negative for fevers, chills, nausea, emesis, hematemesis, coffee-ground emesis, sinus congestion, sinus headache, bilateral ear pain/congestion, myalgias, rash.

## 2017-11-15 NOTE — PROGRESS NOTES
Subjective:   Chief Complaint/History of Present Illness:  Mikal Murillo is a 47 y.o. female established patient who presents today toHave evaluation of acute cough:    Cough  Patient with one-week history of nonproductive cough--but may have noticed this symptom in the past--is concerned that this could be something serious, particularly as she and her  will be travelling to [place] and [place] within the next two days.    Patient reviewed that this could be a chronic cough, and she does state that she has indigestion/heartburn at times. Patient interviewed that she was born in [place], was inoculated with likely BCG vaccination. Therefore, while this could be tuberculosis, is unlikely as patient is otherwise asymptomatic from tuberculosis standpoint (see review of systems as below).    ROS is positive for mild chest congestion/abdominal fullness, otherwise is negative for fevers, chills, nausea, emesis, hematemesis, coffee-ground emesis, sinus congestion, sinus headache, bilateral ear pain/congestion, myalgias, rash.      Patient Active Problem List    Diagnosis Date Noted   • Cough 11/14/2017   • Herpes simplex infection 06/01/2017   • Inguinal lymphadenopathy 06/01/2017   • Irregular menses 04/25/2017   • Finger pain, right 03/02/2017   • Amenorrhea 01/05/2017   • Rapid palpitations 12/05/2016   • Leukopenia 12/05/2016   • Urinary frequency 12/05/2016   • Vitamin D deficiency 08/17/2016   • Intrinsic eczema 07/29/2016   • Allergic rhinitis due to pollen 07/29/2016   • Encounter for gynecological examination without abnormal finding 07/29/2016   • Screening for viral disease 07/29/2016   • Hypothyroid 10/14/2015   • Perimenopausal vasomotor symptoms 05/06/2014       Additional History:   Allergies:    Pcn [penicillins]     Current Medications:     Current Outpatient Prescriptions   Medication Sig Dispense Refill   • omeprazole (PRILOSEC) 20 MG delayed-release capsule Take 1 Cap by mouth 2 times a day. 60 Cap 0  "  • valacyclovir (VALTREX) 1 GM Tab Take 1 Tab by mouth 2 times a day. 14 Tab 3   • vitamin D, Ergocalciferol, (DRISDOL) 78100 UNITS Cap capsule Take 1 Cap by mouth every 7 days. 4 Cap 6   • flyticasone (CUTIVATE) 0.005 % ointment Apply to affected area BID until clear 30 g 0     No current facility-administered medications for this visit.         Social History:     Social History   Substance Use Topics   • Smoking status: Never Smoker   • Smokeless tobacco: Never Used   • Alcohol use 0.0 oz/week      Comment: rare       ROS:     - NOTE: All other systems reviewed and are negative, except as in HPI.     Objective:   Physical Exam:    Vitals: Blood pressure 100/72, pulse 68, temperature 36.2 °C (97.2 °F), resp. rate 14, height 1.6 m (5' 2.99\"), weight 46.3 kg (102 lb), SpO2 96 %, not currently breastfeeding.   BMI: Body mass index is 18.07 kg/m².   General/Constitutional: Vitals as above, Well nourished, well developed female in no acute distress   Head/Eyes: Head is grossly normal & atraumatic, bilateral conjunctivae not injected, bilateral EOMI, bilateral PERRL   ENT: Bilateral external ears grossly normal in appearance, Hearing grossly intact, bilateral external canals without cerumen impaction, bilateral TMs are able to the visualized without erythema/exudates/bulging, External nares normal in appearance and without discharge/bleeding, bilateral frontal/maxillary sinuses non-tender to palpation, posterior oropharynx without erythema/exudates and without airway obstruction   Respiratory: No respiratory distress, bilateral lungs are clear to ausculation in all lung fields (anterior/lateral/posterior), no wheezing/rhonchi/rales   Cardiovascular: Regular rate and rhythm without murmur/gallops/rubs, distal pulses are intact and equal bilaterally (radial, posterior tibial), no bilateral lower extremity edema   Gastrointestinal: Abdomen resonant to percussion, Bowel sounds present in all 4 quadrants, abdomen non-tender " to light and deep palpation including at epigastric area, and no Warren's sign/courvoissier tenderness, hepatosplenomegaly grossly absent, ventral/umbilical hernias absent   MSK: Gait grossly normal & not antalgic   Integumentary: No apparent rashes   Psych: Judgment grossly appropriate, no apparent depression/anxiety      Health Maintenance:      - Not addressed at this visit    Imaging/Labs:      - Not addressed at this visit    Assessment and Plan:   1. Cough  Uncontrolled, based on the discussion with patient, we have talked about the differential diagnosis as well as treatment plan, and have decided to trial her on a PPI and the assumption that this is secondary to gastroesophageal reflux disease.  We may consider evaluation with H. pylori stool admission and in the future if this persists, but are seeming at this point that this is secondary to acute anxiety/stress.   - omeprazole (PRILOSEC) 20 MG delayed-release capsule; Take 1 Cap by mouth 2 times a day.  Dispense: 60 Cap; Refill: 0        RTC: As needed, patient can my chart message me.    PLEASE NOTE: This dictation was created using voice recognition software. I have made every reasonable attempt to correct obvious errors, but I expect that there are errors of grammar and possibly content that I did not discover before finalizing the note.

## 2017-12-19 ENCOUNTER — OFFICE VISIT (OUTPATIENT)
Dept: MEDICAL GROUP | Facility: CLINIC | Age: 47
End: 2017-12-19
Payer: COMMERCIAL

## 2017-12-19 VITALS
RESPIRATION RATE: 16 BRPM | SYSTOLIC BLOOD PRESSURE: 100 MMHG | DIASTOLIC BLOOD PRESSURE: 60 MMHG | OXYGEN SATURATION: 96 % | BODY MASS INDEX: 18.66 KG/M2 | WEIGHT: 105.3 LBS | TEMPERATURE: 98.2 F | HEIGHT: 63 IN | HEART RATE: 72 BPM

## 2017-12-19 DIAGNOSIS — S16.1XXA STRAIN OF NECK MUSCLE, INITIAL ENCOUNTER: ICD-10-CM

## 2017-12-19 DIAGNOSIS — M54.2 NECK PAIN: ICD-10-CM

## 2017-12-19 LAB
INT CON NEG: NEGATIVE
INT CON POS: POSITIVE
POC URINE PREGNANCY TEST: NEGATIVE

## 2017-12-19 PROCEDURE — 81025 URINE PREGNANCY TEST: CPT | Performed by: INTERNAL MEDICINE

## 2017-12-19 PROCEDURE — 99213 OFFICE O/P EST LOW 20 MIN: CPT | Performed by: INTERNAL MEDICINE

## 2017-12-19 RX ORDER — ACETAMINOPHEN 325 MG/1
650 TABLET ORAL EVERY 4 HOURS PRN
COMMUNITY
End: 2018-02-14

## 2017-12-19 RX ORDER — OXYCODONE HYDROCHLORIDE AND ACETAMINOPHEN 5; 325 MG/1; MG/1
1 TABLET ORAL EVERY 8 HOURS PRN
Qty: 15 TAB | Refills: 0 | Status: SHIPPED | OUTPATIENT
Start: 2017-12-19 | End: 2017-12-24

## 2017-12-19 RX ORDER — METHYLPREDNISOLONE 4 MG/1
TABLET ORAL
Qty: 21 TAB | Refills: 0 | Status: SHIPPED | OUTPATIENT
Start: 2017-12-19 | End: 2018-02-14

## 2017-12-19 NOTE — PROGRESS NOTES
CC: Mikal Murillo is a 47 y.o. female is suffering from No chief complaint on file.        SUBJECTIVE:  1. Neck pain  Patient's here for follow-up has a history of neck pain after traveling back to [place].     2. Strain of neck muscle, initial encounter  Patient was strain associated with her neck etiology uncertain        Past social, family, history: ,  is translating  Social History   Substance Use Topics   • Smoking status: Never Smoker   • Smokeless tobacco: Never Used   • Alcohol use 0.0 oz/week      Comment: rare         MEDICATIONS:    Current Outpatient Prescriptions:   •  acetaminophen (TYLENOL) 325 MG Tab, Take 650 mg by mouth every four hours as needed., Disp: , Rfl:   •  oxycodone-acetaminophen (PERCOCET) 5-325 MG Tab, Take 1 Tab by mouth every 8 hours as needed (moderate pain) for up to 5 days. Will cause sedation, avoid driving, operating heavy machinery, and drinking alcohol, Disp: 15 Tab, Rfl: 0  •  MethylPREDNISolone (MEDROL DOSEPAK) 4 MG Tablet Therapy Pack, As directed on the packaging label., Disp: 21 Tab, Rfl: 0  •  omeprazole (PRILOSEC) 20 MG delayed-release capsule, Take 1 Cap by mouth 2 times a day., Disp: 60 Cap, Rfl: 0  •  valacyclovir (VALTREX) 1 GM Tab, Take 1 Tab by mouth 2 times a day., Disp: 14 Tab, Rfl: 3  •  vitamin D, Ergocalciferol, (DRISDOL) 24631 UNITS Cap capsule, Take 1 Cap by mouth every 7 days., Disp: 4 Cap, Rfl: 6  •  flyticasone (CUTIVATE) 0.005 % ointment, Apply to affected area BID until clear, Disp: 30 g, Rfl: 0    PROBLEMS:  Patient Active Problem List    Diagnosis Date Noted   • Cough 11/14/2017   • Herpes simplex infection 06/01/2017   • Inguinal lymphadenopathy 06/01/2017   • Irregular menses 04/25/2017   • Finger pain, right 03/02/2017   • Amenorrhea 01/05/2017   • Rapid palpitations 12/05/2016   • Leukopenia 12/05/2016   • Urinary frequency 12/05/2016   • Vitamin D deficiency 08/17/2016   • Intrinsic eczema 07/29/2016   • Allergic rhinitis due to  "pollen 07/29/2016   • Encounter for gynecological examination without abnormal finding 07/29/2016   • Screening for viral disease 07/29/2016   • Hypothyroid 10/14/2015   • Perimenopausal vasomotor symptoms 05/06/2014       REVIEW OF SYSTEMS:  Gen.:  No Nausea, Vomiting, fever, Chills.  Heart: No chest pain.  Lungs:  No shortness of Breath.  Psychological: Osvaldo unusual Anxiety depression     PHYSICAL EXAM   Constitutional: Alert, cooperative, not in acute distress.  Cardiovascular:  Rate Rhythm is regular without murmurs rubs clicks.     Thorax & Lungs: Clear to auscultation, no wheezing, rhonchi, or rales  HENT: Normocephalic, Atraumatic.  Eyes: PERRLA, EOMI, Conjunctiva normal.   Neck: Trachia is midline no swelling of the thyroid.   Lymphatic: No lymphadenopathy noted.   Musculoskeletal:Patient with pain to palpation approximately the C2 aspect at the right side of the neck  Neurologic: Alert & oriented x 3, cranial nerves II through XII are intact, Normal motor function, Normal sensory function, No focal deficits noted.   Psychiatric: Affect normal, Judgment normal, Mood normal.     VITAL SIGNS:/60   Pulse 72   Temp 36.8 °C (98.2 °F)   Resp 16   Ht 1.6 m (5' 3\")   Wt 47.8 kg (105 lb 4.8 oz)   SpO2 96%   Breastfeeding? No   BMI 18.65 kg/m²     Labs: Reviewed    Assessment:                                                     Plan:    1. Neck pain  Neck pain etiology uncertain suspect simple sprain strain  - POCT Pregnancy  - oxycodone-acetaminophen (PERCOCET) 5-325 MG Tab; Take 1 Tab by mouth every 8 hours as needed (moderate pain) for up to 5 days. Will cause sedation, avoid driving, operating heavy machinery, and drinking alcohol  Dispense: 15 Tab; Refill: 0  - MethylPREDNISolone (MEDROL DOSEPAK) 4 MG Tablet Therapy Pack; As directed on the packaging label.  Dispense: 21 Tab; Refill: 0  - DX-CERVICAL SPINE-2 OR 3 VIEWS; Future  - REFERRAL TO PHYSICAL THERAPY Reason for Therapy: " Eval/Treat/Report    2. Strain of neck muscle, initial encounter  Treated as above  - POCT Pregnancy  - oxycodone-acetaminophen (PERCOCET) 5-325 MG Tab; Take 1 Tab by mouth every 8 hours as needed (moderate pain) for up to 5 days. Will cause sedation, avoid driving, operating heavy machinery, and drinking alcohol  Dispense: 15 Tab; Refill: 0  - MethylPREDNISolone (MEDROL DOSEPAK) 4 MG Tablet Therapy Pack; As directed on the packaging label.  Dispense: 21 Tab; Refill: 0    Patient was instructed through her  to use the Medrol Dosepak also Percocet, if not improved significantly x-rays and then follow-up with physician if significant improvement but still residual pain physical therapy would be appropriate if a 50% decrease in pain is achieved

## 2018-01-03 ENCOUNTER — OFFICE VISIT (OUTPATIENT)
Dept: MEDICAL GROUP | Facility: MEDICAL CENTER | Age: 48
End: 2018-01-03
Payer: COMMERCIAL

## 2018-01-03 VITALS
HEIGHT: 63 IN | OXYGEN SATURATION: 95 % | DIASTOLIC BLOOD PRESSURE: 60 MMHG | HEART RATE: 66 BPM | BODY MASS INDEX: 18.61 KG/M2 | SYSTOLIC BLOOD PRESSURE: 100 MMHG | TEMPERATURE: 97.6 F | WEIGHT: 105 LBS | RESPIRATION RATE: 16 BRPM

## 2018-01-03 DIAGNOSIS — L20.84 INTRINSIC ECZEMA: ICD-10-CM

## 2018-01-03 DIAGNOSIS — M54.2 NECK PAIN, ACUTE: ICD-10-CM

## 2018-01-03 PROCEDURE — 99214 OFFICE O/P EST MOD 30 MIN: CPT | Performed by: FAMILY MEDICINE

## 2018-01-03 RX ORDER — CYCLOBENZAPRINE HCL 5 MG
5 TABLET ORAL NIGHTLY PRN
Qty: 30 TAB | Refills: 0 | Status: SHIPPED | OUTPATIENT
Start: 2018-01-03 | End: 2018-02-14

## 2018-01-03 RX ORDER — CLOTRIMAZOLE AND BETAMETHASONE DIPROPIONATE 10; .64 MG/G; MG/G
CREAM TOPICAL
Qty: 60 G | Refills: 1 | Status: SHIPPED | OUTPATIENT
Start: 2018-01-03 | End: 2018-02-14

## 2018-01-03 NOTE — PATIENT INSTRUCTIONS
"Soft Tissue Injury of the Neck  A soft tissue injury of the neck may be either blunt or penetrating. A blunt injury does not break the skin. A penetrating injury breaks the skin, creating an open wound. Blunt injuries may happen in several ways. Most involve some type of direct blow to the neck. This can cause serious injury to the windpipe, voice box, cervical spine, or esophagus. In some cases, the injury to the soft tissue can also result in a break (fracture) of the cervical spine.   Soft tissue injuries of the neck require immediate medical care. Sometimes, you may not notice the signs of injury right away. You may feel fine at first, but the swelling may eventually close off your airway. This could result in a significant or life-threatening injury. This is rare, but it is important to keep in mind with any injury to the neck.   CAUSES   Causes of blunt injury may include:  · \"Clothesline\" injuries. This happens when someone is moving at high speed and runs into a clothesline, outstretched arm, or similar object. This results in a direct injury to the front of the neck. If the airway is blocked, it can cause suffocation due to lack of oxygen (asphyxiation) or even instant death.  · High-energy trauma. This includes injuries from motor vehicle crashes, falling from a great height, or heavy objects falling onto the neck.  · Sports-related injuries. Injury to the windpipe and voice box can result from being struck by another player or being struck by an object, such as a baseball, hockey stick, or an outstretched arm.  · Strangulation. This type of injury may cause skin trauma, hoarseness of voice, or broken cartilage in the voice box or windpipe. It may also cause a serious airway problem.  SYMPTOMS   · Bruising.  · Pain and tenderness in the neck.  · Swelling of the neck and face.  · Hoarseness of voice.  · Pain or difficulty with swallowing.  · Drooling or inability to swallow.  · Trouble breathing. This may " become worse when lying flat.  · Coughing up blood.  · High-pitched, harsh, vibratory noise due to partial obstruction of the windpipe (stridor).  · Swelling of the upper arms.  · Windpipe that appears to be pushed off to one side.  · Air in the tissues under the skin of the neck or chest (subcutaneous emphysema). This usually indicates a problem with the normal airway and is a medical emergency.  DIAGNOSIS   · If possible, your caregiver may ask about the details of how the injury occurred. A detailed exam can help to identify specific areas of the neck that are injured.  · Your caregiver may ask for tests to rule out injury of the voice box, airway, or esophagus. This may include X-rays, ultrasounds, CT scans, or MRI scans, depending on the severity of your injury.  TREATMENT   If you have an injury to your windpipe or voice box, immediate medical care is required. In almost all cases, hospitalization is necessary. For injuries that do not appear to require surgery, it is helpful to have medical observation for 24 hours. You may be asked to do one or more of the following:  · Rest your voice.  · Bed rest.  · Limit your diet, depending on the extent of the injury. Follow your caregiver's dietary guidelines. Often, only fluids and soft foods are recommended.  · Keep your head raised.  · Breathe humidified air.  · Take medicines to control infection, reduce swelling, and reduce normal stomach acid. You may also need pain medicine, depending on your injury.  For injuries that appear to require surgery, you will need to stay in the hospital. The exact type of procedure needed will depend on your exact injury or injuries.   HOME CARE INSTRUCTIONS   · If the skin was broken, keep the wound area clean and dry. Wear your bandage (dressing) and care for your wound as instructed.  · Follow your caregiver's advice about your diet.  · Follow your caregiver's advice about use of your voice.  · Take medicines as  directed.  · Keep your head and neck at least partially raised (elevated) while recovering. This should also be done while sleeping.  SEEK MEDICAL CARE IF:   · Your voice becomes weaker.  · Your swelling or bruising is not improving as expected. Typically, this takes several days to improve.  · You feel that you are having problems with medicines prescribed.  · You have drainage from the injury site. This may be a sign that your wound is not healing properly or is infected.  · You develop increasing pain or difficulty while swallowing.  · You develop an oral temperature of 102° F (38.9° C) or higher.  SEEK IMMEDIATE MEDICAL CARE IF:   · You cough up blood.  · You develop sudden trouble breathing.  · You cannot tolerate your oral medicines, or you are unable to swallow.  · You develop drooling.  · You have new or worsening vomiting.  · You develop sudden, new swelling of the neck or face.  · You have an oral temperature above 102° F (38.9° C), not controlled by medicine.  MAKE SURE YOU:  · Understand these instructions.  · Will watch your condition.  · Will get help right away if you are not doing well or get worse.     This information is not intended to replace advice given to you by your health care provider. Make sure you discuss any questions you have with your health care provider.     Document Released: 03/26/2009 Document Revised: 03/11/2013 Document Reviewed: 03/05/2012  [employer] Patient Education ©2016 [employer]

## 2018-01-03 NOTE — ASSESSMENT & PLAN NOTE
Patient was traveling between [facility] and [facility] and lifting heavy luggage.  She went to Urgent Care and was given steroids that helped immensely, she is still having a little bit of pain. It's primarily on the right-hand side. It hurts more with movement and is a little stiff. She hasn't had any fever or chills. No headaches. She denies any numbness or tingling.

## 2018-01-05 NOTE — ASSESSMENT & PLAN NOTE
Patient has a reoccurrence of her scaling rash at the base of her scalp. She has had cream in the past that has worked well. It had almost totally resolved over the last year but recently recurred. It is quite itchy.

## 2018-01-05 NOTE — PROGRESS NOTES
Subjective:     Chief Complaint   Patient presents with   • Follow-Up     Neck and Back pain,    • Eczema     Back of neck,        Mikal Murillo is a 47 y.o. female here today for evaluation and management of:    Neck pain, acute  Patient was traveling between [facility] and [facility] and lifting heavy luggage.  She went to Urgent Care and was given steroids that helped immensely, she is still having a little bit of pain. It's primarily on the right-hand side. It hurts more with movement and is a little stiff. She hasn't had any fever or chills. No headaches. She denies any numbness or tingling.    Intrinsic eczema  Patient has a reoccurrence of her scaling rash at the base of her scalp. She has had cream in the past that has worked well. It had almost totally resolved over the last year but recently recurred. It is quite itchy.       Allergies   Allergen Reactions   • Pcn [Penicillins]        Current medicines (including changes today)  Current Outpatient Prescriptions   Medication Sig Dispense Refill   • clotrimazole-betamethasone (LOTRISONE) 1-0.05 % Cream Apply to affected area BID until clear 60 g 1   • cyclobenzaprine (FLEXERIL) 5 MG tablet Take 1 Tab by mouth at bedtime as needed for Muscle Spasms. 30 Tab 0   • acetaminophen (TYLENOL) 325 MG Tab Take 650 mg by mouth every four hours as needed.     • MethylPREDNISolone (MEDROL DOSEPAK) 4 MG Tablet Therapy Pack As directed on the packaging label. 21 Tab 0   • omeprazole (PRILOSEC) 20 MG delayed-release capsule Take 1 Cap by mouth 2 times a day. 60 Cap 0   • valacyclovir (VALTREX) 1 GM Tab Take 1 Tab by mouth 2 times a day. 14 Tab 3   • vitamin D, Ergocalciferol, (DRISDOL) 27639 UNITS Cap capsule Take 1 Cap by mouth every 7 days. 4 Cap 6   • flyticasone (CUTIVATE) 0.005 % ointment Apply to affected area BID until clear 30 g 0     No current facility-administered medications for this visit.        She  has a past medical history of Hypothyroid and Perimenopausal vasomotor  "symptoms (5/6/2014).    Patient Active Problem List    Diagnosis Date Noted   • Neck pain, acute 01/03/2018   • Cough 11/14/2017   • Herpes simplex infection 06/01/2017   • Inguinal lymphadenopathy 06/01/2017   • Irregular menses 04/25/2017   • Finger pain, right 03/02/2017   • Amenorrhea 01/05/2017   • Rapid palpitations 12/05/2016   • Leukopenia 12/05/2016   • Urinary frequency 12/05/2016   • Vitamin D deficiency 08/17/2016   • Intrinsic eczema 07/29/2016   • Allergic rhinitis due to pollen 07/29/2016   • Encounter for gynecological examination without abnormal finding 07/29/2016   • Screening for viral disease 07/29/2016   • Hypothyroid 10/14/2015   • Perimenopausal vasomotor symptoms 05/06/2014       ROS   No fever or chills.  No nausea or vomiting.  No chest pain or palpitations.  No cough or SOB.  No pain with urination or hematuria.  No black or bloody stools.       Objective:     Blood pressure 100/60, pulse 66, temperature 36.4 °C (97.6 °F), resp. rate 16, height 1.6 m (5' 3\"), weight 47.6 kg (105 lb), SpO2 95 %. Body mass index is 18.6 kg/m².   Physical Exam:  Well developed, well nourished.  Alert, oriented in no acute distress.  Eye contact is good, speech goal directed, affect calm  Eyes: conjunctiva non-injected, sclera non-icteric.  Ears: Pinna normal. TM pearly gray.   Nose: Nares are patent.  Normal mucosa  Mouth: Oral mucous membranes pink and moist with no lesions.  Neck Supple.  No adenopathy or masses in the neck or supraclavicular regions. No thyromegaly. Tenderness to palpation of the strap muscles of the right extending down to the trapezius. Muscle spasm is present. There is full range of motion. Negative Spurling  Lungs: clear to auscultation bilaterally with good excursion. No wheezes or rhonchi  CV: regular rate and rhythm. No murmur  Skin: Erythematous, scaling plaque-like rash at the base of the school with excoriated area    Assessment and Plan:   The following treatment plan was " discussed    1. Neck pain, acute  Refer to physical therapy. Flexeril as needed for muscle spasm. Ice  - REFERRAL TO PHYSICAL THERAPY Reason for Therapy: Eval/Treat/Report  - cyclobenzaprine (FLEXERIL) 5 MG tablet; Take 1 Tab by mouth at bedtime as needed for Muscle Spasms.  Dispense: 30 Tab; Refill: 0    2. Intrinsic eczema  Lotrisone as directed. Refer to dermatology if not improving.  - clotrimazole-betamethasone (LOTRISONE) 1-0.05 % Cream; Apply to affected area BID until clear  Dispense: 60 g; Refill: 1    Any change or worsening of signs or symptoms, patient encouraged to follow-up or report to the emergency room for further evaluation. Patient understands and agrees.    Followup: Return if symptoms worsen or fail to improve.

## 2018-02-14 ENCOUNTER — OFFICE VISIT (OUTPATIENT)
Dept: INTERNAL MEDICINE | Facility: MEDICAL CENTER | Age: 48
End: 2018-02-14
Payer: COMMERCIAL

## 2018-02-14 VITALS
BODY MASS INDEX: 18.78 KG/M2 | SYSTOLIC BLOOD PRESSURE: 105 MMHG | HEART RATE: 67 BPM | OXYGEN SATURATION: 99 % | DIASTOLIC BLOOD PRESSURE: 72 MMHG | WEIGHT: 106 LBS | TEMPERATURE: 97.3 F | HEIGHT: 63 IN

## 2018-02-14 DIAGNOSIS — F41.9 ANXIETY: ICD-10-CM

## 2018-02-14 DIAGNOSIS — N95.1 PERIMENOPAUSAL VASOMOTOR SYMPTOMS: ICD-10-CM

## 2018-02-14 DIAGNOSIS — R00.2 PALPITATIONS: ICD-10-CM

## 2018-02-14 LAB — GLUCOSE BLD-MCNC: 84 MG/DL (ref 70–100)

## 2018-02-14 PROCEDURE — 99214 OFFICE O/P EST MOD 30 MIN: CPT | Mod: GC | Performed by: INTERNAL MEDICINE

## 2018-02-14 PROCEDURE — 93000 ELECTROCARDIOGRAM COMPLETE: CPT | Performed by: INTERNAL MEDICINE

## 2018-02-14 PROCEDURE — 82962 GLUCOSE BLOOD TEST: CPT | Performed by: INTERNAL MEDICINE

## 2018-02-14 RX ORDER — LORAZEPAM 0.5 MG/1
0.5 TABLET ORAL NIGHTLY PRN
Qty: 10 TAB | Refills: 0 | Status: SHIPPED | OUTPATIENT
Start: 2018-02-14 | End: 2018-02-26

## 2018-02-14 ASSESSMENT — PATIENT HEALTH QUESTIONNAIRE - PHQ9: CLINICAL INTERPRETATION OF PHQ2 SCORE: 0

## 2018-02-14 NOTE — PATIENT INSTRUCTIONS
Perimenopause  Perimenopause is the time when your body begins to move into the menopause (no menstrual period for 12 straight months). It is a natural process. Perimenopause can begin 2-8 years before the menopause and usually lasts for 1 year after the menopause. During this time, your ovaries may or may not produce an egg. The ovaries vary in their production of estrogen and progesterone hormones each month. This can cause irregular menstrual periods, difficulty getting pregnant, vaginal bleeding between periods, and uncomfortable symptoms.  CAUSES  · Irregular production of the ovarian hormones, estrogen and progesterone, and not ovulating every month.  · Other causes include:  ¨ Tumor of the pituitary gland in the brain.  ¨ Medical disease that affects the ovaries.  ¨ Radiation treatment.  ¨ Chemotherapy.  ¨ Unknown causes.  ¨ Heavy smoking and excessive alcohol intake can bring on perimenopause sooner.  SIGNS AND SYMPTOMS   · Hot flashes.  · Night sweats.  · Irregular menstrual periods.  · Decreased sex drive.  · Vaginal dryness.  · Headaches.  · Mood swings.  · Depression.  · Memory problems.  · Irritability.  · Tiredness.  · Weight gain.  · Trouble getting pregnant.  · The beginning of losing bone cells (osteoporosis).  · The beginning of hardening of the arteries (atherosclerosis).  DIAGNOSIS   Your health care provider will make a diagnosis by analyzing your age, menstrual history, and symptoms. He or she will do a physical exam and note any changes in your body, especially your female organs. Female hormone tests may or may not be helpful depending on the amount of female hormones you produce and when you produce them. However, other hormone tests may be helpful to rule out other problems.  TREATMENT   In some cases, no treatment is needed. The decision on whether treatment is necessary during the perimenopause should be made by you and your health care provider based on how the symptoms are affecting you  and your lifestyle. Various treatments are available, such as:  · Treating individual symptoms with a specific medicine for that symptom.  · Herbal medicines that can help specific symptoms.  · Counseling.  · Group therapy.  HOME CARE INSTRUCTIONS   · Keep track of your menstrual periods (when they occur, how heavy they are, how long between periods, and how long they last) as well as your symptoms and when they started.  · Only take over-the-counter or prescription medicines as directed by your health care provider.  · Sleep and rest.  · Exercise.  · Eat a diet that contains calcium (good for your bones) and soy (acts like the estrogen hormone).  · Do not smoke.  · Avoid alcoholic beverages.  · Take vitamin supplements as recommended by your health care provider. Taking vitamin E may help in certain cases.  · Take calcium and vitamin D supplements to help prevent bone loss.  · Group therapy is sometimes helpful.  · Acupuncture may help in some cases.  SEEK MEDICAL CARE IF:   · You have questions about any symptoms you are having.  · You need a referral to a specialist (gynecologist, psychiatrist, or psychologist).  SEEK IMMEDIATE MEDICAL CARE IF:   · You have vaginal bleeding.  · Your period lasts longer than 8 days.  · Your periods are recurring sooner than 21 days.  · You have bleeding after intercourse.  · You have severe depression.  · You have pain when you urinate.  · You have severe headaches.  · You have vision problems.     This information is not intended to replace advice given to you by your health care provider. Make sure you discuss any questions you have with your health care provider.     Document Released: 01/25/2006 Document Revised: 01/08/2016 Document Reviewed: 07/17/2014  [employer] Interactive Patient Education ©2016 [employer]

## 2018-02-14 NOTE — PROGRESS NOTES
New Patient to Establish    Reason to establish: New patient to establish primary care    CC: Chest pain, and anxiety      HPI: Patient is a pleasant 47-year-old Chinese American accompanied with her  who is a [place]ese American, is here for an acute short visit as she could not get appointment with her primary care doctor.    She reports an episode of severe anxiety yesterday night while going to bed associated with severe chest pain which subsided on its own. She has been getting severe  episodes of intense anxiety and feeling that she will die with heart attack , episodes have been happening for the last 2 years, and has repeatedly been to different urgent care centers where cardiac workup was negative for any acute coronary syndrome. She had her last menstrual period 5 months ago prior to that her menstrual cycles were regular.    She declined any episodes of pounding headache, loss of vision, loss of muscle tone or weakness of the body. There is no family history of depression or anxiety,  she does not have any suicidal or homicidal ideations. She is a nonsmoker nonalcoholic does not abuse any drugs , she is  and has one child from her previous marriage in [place]. Currently lives with her [place]ese  who has 3 other kids from his previous marriage.      Patient Active Problem List    Diagnosis Date Noted   • Anxiety disorder 02/14/2018   • Neck pain, acute 01/03/2018   • Cough 11/14/2017   • Herpes simplex infection 06/01/2017   • Inguinal lymphadenopathy 06/01/2017   • Irregular menses 04/25/2017   • Finger pain, right 03/02/2017   • Amenorrhea 01/05/2017   • Rapid palpitations 12/05/2016   • Leukopenia 12/05/2016   • Urinary frequency 12/05/2016   • Vitamin D deficiency 08/17/2016   • Intrinsic eczema 07/29/2016   • Allergic rhinitis due to pollen 07/29/2016   • Encounter for gynecological examination without abnormal finding 07/29/2016   • Screening for viral disease 07/29/2016   •  Hypothyroid 10/14/2015   • Perimenopausal vasomotor symptoms 05/06/2014       Past Medical History:   Diagnosis Date   • Hypothyroid    • Perimenopausal vasomotor symptoms 5/6/2014       Current Outpatient Prescriptions   Medication Sig Dispense Refill   • LORazepam (ATIVAN) 0.5 MG Tab Take 1 Tab by mouth at bedtime as needed for Anxiety for up to 12 days. 10 Tab 0     No current facility-administered medications for this visit.        Allergies as of 02/14/2018 - Reviewed 02/14/2018   Allergen Reaction Noted   • Pcn [penicillins]  07/16/2010       Social History     Social History   • Marital status:      Spouse name: N/A   • Number of children: N/A   • Years of education: High School     Occupational History   •       House wife     Social History Main Topics   • Smoking status: Never Smoker   • Smokeless tobacco: Never Used   • Alcohol use 0.0 oz/week      Comment: rare   • Drug use: No   • Sexual activity: Yes     Partners: Male     Other Topics Concern   • Seat Belt Yes     Social History Narrative    She is chinese,     Lives with her current 2nd  who is Sudanese and his kid    Children: 1 boy 22 yr old from previous     Work: stay at home       Family History   Problem Relation Age of Onset   • Dementia Mother    • Hypertension Mother    • Diabetes Mother    • Heart Disease Father    • Diabetes Father    • Thyroid Sister    • Cancer Brother      half brother, prostate   • Hyperlipidemia Brother      half brother   • Stroke Neg Hx    • Psychiatry Neg Hx        History reviewed. No pertinent surgical history.    ROS: As per HPI. Additional pertinent symptoms as noted below.  Negative for syncopal episodes or stroke  Negative for orthopnea  Negative for paroxysmal nocturnal dyspnea  Negative for bleeding or clotting disorders  Negative for cough, fever shortness of breath  Positive for chest pain or right side 5th rib, which is present only while palpating deeply      /72   Pulse 67   " Temp 36.3 °C (97.3 °F)   Ht 1.6 m (5' 3\")   Wt 48.1 kg (106 lb)   SpO2 99%   BMI 18.78 kg/m²     Physical Exam    General:  Patient is anxious, afebrile, anicteric   Alert and oriented,   Eyes: Pupils equal and reactive. No scleral icterus.  Throat: Clear no erythema or exudates noted.  Neck: Supple. No lymphadenopathy noted. Thyroid not enlarged.  Lungs: Clear to auscultation and percussion bilaterally.  Cardiovascular: Regular rate and rhythm. No murmurs, rubs or gallops.  There is definite tenderness over the right fifth costochondral junction on deep palpation  No pericardial rub heard  No jugular venous distention nor any hepatojugular reflex  Abdomen:  Benign. No rebound or guarding noted.  Extremities: No clubbing, cyanosis, edema.  Skin: Clear. No rash or suspicious skin lesions noted.  Other mood and affect normal  EKG   normal sinus rhythm   heart rate 60 per minute   NY interval 991 ms   QRS duration 84 ms   QT interval 426 ms   QTc corrected 426 ms   no Q waves, arrhythmias, or T wave inversions   good progression of the R waves in precordial leads      Assessment and Plan    1. Anxiety  2. Palpitations  3. Perimenopausal vasomotor symptoms  As discussed above in the history and physical examination this is a 47-year-old Chinese American who is undergoing menopausal symptoms,   BP Stable, Heart and Lungs Normal  Her Blood sugar is 84 and EKG shows normal sinus rhythm with no evidence of any ischemic changes or any cardiac conduction defects or any arrhythmias  To confirm her menopausal symptoms we will obtainher FSH LH ratio and estradiol levels.  She is advised to take lorazepam 0.5 mg only when she has got impending sense of severe anxiety or phobia.  Will follow-up in 2 weeks        Risk Assessment (discuss potential complications a function of chronic problems):     Complexity (discuss number of co-morbidities):     Signed by: Aniket Holden M.D.  "

## 2018-05-17 ENCOUNTER — OFFICE VISIT (OUTPATIENT)
Dept: MEDICAL GROUP | Facility: CLINIC | Age: 48
End: 2018-05-17
Payer: COMMERCIAL

## 2018-05-17 VITALS
SYSTOLIC BLOOD PRESSURE: 88 MMHG | DIASTOLIC BLOOD PRESSURE: 54 MMHG | HEIGHT: 63 IN | WEIGHT: 104 LBS | BODY MASS INDEX: 18.43 KG/M2 | RESPIRATION RATE: 16 BRPM | OXYGEN SATURATION: 95 % | HEART RATE: 70 BPM | TEMPERATURE: 97 F

## 2018-05-17 DIAGNOSIS — B02.9 HERPES ZOSTER WITHOUT COMPLICATION: ICD-10-CM

## 2018-05-17 PROCEDURE — 99213 OFFICE O/P EST LOW 20 MIN: CPT | Performed by: NURSE PRACTITIONER

## 2018-05-17 RX ORDER — VALACYCLOVIR HYDROCHLORIDE 500 MG/1
500 TABLET, FILM COATED ORAL 2 TIMES DAILY
Qty: 14 TAB | Refills: 0 | Status: SHIPPED | OUTPATIENT
Start: 2018-05-17 | End: 2018-05-24

## 2018-05-17 RX ORDER — VALACYCLOVIR HYDROCHLORIDE 500 MG/1
500 TABLET, FILM COATED ORAL 2 TIMES DAILY
Status: DISCONTINUED | OUTPATIENT
Start: 2018-05-17 | End: 2018-05-17

## 2018-05-17 ASSESSMENT — ENCOUNTER SYMPTOMS
CHILLS: 0
FEVER: 0

## 2018-05-17 NOTE — PROGRESS NOTES
Chief Complaint   Patient presents with   • Rash     rash and redness on tail bone        HISTORY OF PRESENT ILLNESS: Patient is a 48 y.o. female established patient who presents today due to rashes, vesicle on tail bone areas. Pt reprots she ahs flare up like this about two years ago. She reports she seems to get this every time when she has more stress. She reports it is a little bit soreness, burning and shocking pain. Other than that, she has no fever, no chills. She did noticed right groin lymph node seems to be a little be enlarge but it does not hurt.       Patient Active Problem List    Diagnosis Date Noted   • Anxiety disorder 2018   • Neck pain, acute 2018   • Cough 2017   • Herpes simplex infection 2017   • Inguinal lymphadenopathy 2017   • Irregular menses 2017   • Finger pain, right 2017   • Amenorrhea 2017   • Rapid palpitations 2016   • Leukopenia 2016   • Urinary frequency 2016   • Vitamin D deficiency 2016   • Intrinsic eczema 2016   • Allergic rhinitis due to pollen 2016   • Encounter for gynecological examination without abnormal finding 2016   • Screening for viral disease 2016   • Hypothyroid 10/14/2015   • Perimenopausal vasomotor symptoms 2014       Allergies:Pcn [penicillins]    Current Outpatient Prescriptions   Medication Sig Dispense Refill   • valACYclovir (VALTREX) 500 MG Tab Take 1 Tab by mouth 2 times a day for 7 days. 14 Tab 0     No current facility-administered medications for this visit.        Social History   Substance Use Topics   • Smoking status: Never Smoker   • Smokeless tobacco: Never Used   • Alcohol use 0.0 oz/week      Comment: rare       Family Status   Relation Status   • Mother    • Father Alive   • Sister    • Brother    • Brother    • Neg Hx      Family History   Problem Relation Age of Onset   • Dementia Mother    • Hypertension Mother    • Diabetes Mother   "  • Heart Disease Father    • Diabetes Father    • Thyroid Sister    • Cancer Brother      half brother, prostate   • Hyperlipidemia Brother      half brother   • Stroke Neg Hx    • Psychiatry Neg Hx          ROS:  Review of Systems   Constitutional: Negative for chills and fever.   Genitourinary: Negative for dysuria, frequency and urgency.        No rashes in private area, no vaginal discharge   Skin: Positive for rash.        Objective:     Blood pressure (!) 88/54, pulse 70, temperature 36.1 °C (97 °F), resp. rate 16, height 1.6 m (5' 3\"), weight 47.2 kg (104 lb), SpO2 95 %.     Physical Exam:  Physical Exam   Constitutional: She is oriented to person, place, and time and well-developed, well-nourished, and in no distress.   HENT:   Head: Normocephalic and atraumatic.   Eyes: Conjunctivae are normal.   Neck: Neck supple.   Cardiovascular: Normal rate and regular rhythm.    Pulmonary/Chest: Effort normal. No respiratory distress.   Neurological: She is alert and oriented to person, place, and time.   Skin: Skin is warm. Rash ( group vesicles ) noted. No erythema.   Vitals reviewed.        Assessment/Plan:  1. Herpes zoster without complication  - valACYclovir (VALTREX) 500 MG Tab; Take 1 Tab by mouth 2 times a day for 7 days.  Dispense: 14 Tab; Refill: 0      Differential diagnoses and indications for immediate follow-up discussed with patient.    Instructed to return to clinic or nearest emergency department for any change in condition, further concerns, or worsening of symptoms.    The patient demonstrated a good understanding and agreed with the treatment plan.         "

## 2018-05-30 ENCOUNTER — OFFICE VISIT (OUTPATIENT)
Dept: MEDICAL GROUP | Facility: MEDICAL CENTER | Age: 48
End: 2018-05-30
Payer: COMMERCIAL

## 2018-05-30 VITALS
DIASTOLIC BLOOD PRESSURE: 60 MMHG | TEMPERATURE: 97.5 F | BODY MASS INDEX: 18.43 KG/M2 | SYSTOLIC BLOOD PRESSURE: 104 MMHG | OXYGEN SATURATION: 95 % | RESPIRATION RATE: 20 BRPM | HEIGHT: 63 IN | HEART RATE: 65 BPM | WEIGHT: 104 LBS

## 2018-05-30 DIAGNOSIS — E55.9 VITAMIN D DEFICIENCY: ICD-10-CM

## 2018-05-30 DIAGNOSIS — N95.1 PERIMENOPAUSAL VASOMOTOR SYMPTOMS: ICD-10-CM

## 2018-05-30 DIAGNOSIS — Z13.220 SCREENING FOR HYPERLIPIDEMIA: ICD-10-CM

## 2018-05-30 DIAGNOSIS — D72.819 LEUKOPENIA, UNSPECIFIED TYPE: ICD-10-CM

## 2018-05-30 DIAGNOSIS — M77.11 LATERAL EPICONDYLITIS OF RIGHT ELBOW: ICD-10-CM

## 2018-05-30 DIAGNOSIS — Z13.29 SCREENING FOR THYROID DISORDER: ICD-10-CM

## 2018-05-30 DIAGNOSIS — Z13.1 SCREENING FOR DIABETES MELLITUS: ICD-10-CM

## 2018-05-30 PROBLEM — R05.9 COUGH: Status: RESOLVED | Noted: 2017-11-14 | Resolved: 2018-05-30

## 2018-05-30 PROBLEM — N92.6 IRREGULAR MENSES: Status: RESOLVED | Noted: 2017-04-25 | Resolved: 2018-05-30

## 2018-05-30 PROBLEM — M79.644 FINGER PAIN, RIGHT: Status: RESOLVED | Noted: 2017-03-02 | Resolved: 2018-05-30

## 2018-05-30 PROBLEM — N91.2 AMENORRHEA: Status: RESOLVED | Noted: 2017-01-05 | Resolved: 2018-05-30

## 2018-05-30 PROCEDURE — 99214 OFFICE O/P EST MOD 30 MIN: CPT | Performed by: FAMILY MEDICINE

## 2018-05-30 NOTE — ASSESSMENT & PLAN NOTE
She is having nightly hot flashes that really disrupt her sleep for the last 6 months.  Last period was one year ago.

## 2018-05-30 NOTE — PATIENT INSTRUCTIONS
Tennis Elbow  Tennis elbow is puffiness (inflammation) of the outer tendons of your forearm close to your elbow. Your tendons attach your muscles to your bones. Tennis elbow can happen in any sport or job in which you use your elbow too much. It is caused by doing the same motion over and over. Tennis elbow can cause:  · Pain and tenderness in your forearm and the outer part of your elbow.  · A burning feeling. This runs from your elbow through your arm.  · Weak  in your hands.  Follow these instructions at home:  Activity  · Rest your elbow and wrist as told by your doctor. Try to avoid any activities that caused the problem until your doctor says that you can do them again.  · If a physical therapist teaches you exercises, do all of them as told.  · If you lift an object, lift it with your palm facing up. This is easier on your elbow.  Lifestyle  · If your tennis elbow is caused by sports, check your equipment and make sure that:  ¨ You are using it correctly.  ¨ It fits you well.  · If your tennis elbow is caused by work, take breaks often, if you are able. Talk with your manager about doing your work in a way that is safe for you.  ¨ If your tennis elbow is caused by computer use, talk with your manager about any changes that can be made to your work setup.  General instructions  · If told, apply ice to the painful area:  ¨ Put ice in a plastic bag.  ¨ Place a towel between your skin and the bag.  ¨ Leave the ice on for 20 minutes, 2-3 times per day.  · Take medicines only as told by your doctor.  · If you were given a brace, wear it as told by your doctor.  · Keep all follow-up visits as told by your doctor. This is important.  Contact a doctor if:  · Your pain does not get better with treatment.  · Your pain gets worse.  · You have weakness in your forearm, hand, or fingers.  · You cannot feel your forearm, hand, or fingers.  This information is not intended to replace advice given to you by your health  care provider. Make sure you discuss any questions you have with your health care provider.  Document Released: 06/07/2011 Document Revised: 08/17/2017 Document Reviewed: 12/14/2015  [employer] Interactive Patient Education © 2017 [employer]

## 2018-06-05 NOTE — ASSESSMENT & PLAN NOTE
Patient had slight leukopenia on her last labs. She has not had frequent infections. No sweats or night sweats.

## 2018-06-05 NOTE — ASSESSMENT & PLAN NOTE
Patient complains of pain in the lateral portion of the right elbow. She likes to clinic and has been doing a lot of chopping. She'd rather do it by hand than with the Cuisinart. She has not done any nonsteroidal anti-inflammatories or ice to the area. She denies any injury

## 2018-06-05 NOTE — PROGRESS NOTES
Subjective:     Chief Complaint   Patient presents with   • Adenopathy     swelling x 3 months, hx of thyroid issues as well   • Arm Pain     X 3 Months; R Forearm, mainly in morning    • Orders Needed     requesting labs, annual labs, hormone levels because shes having hot flashes at night,        Mikal Murillo is a 48 y.o. female here today for evaluation and management of:    Perimenopausal vasomotor symptoms  She is having nightly hot flashes that really disrupt her sleep for the last 6 months.  Last period was one year ago.    Lateral epicondylitis of right elbow  Patient complains of pain in the lateral portion of the right elbow. She likes to clinic and has been doing a lot of chopping. She'd rather do it by hand than with the Cuisinart. She has not done any nonsteroidal anti-inflammatories or ice to the area. She denies any injury    Leukopenia  Patient had slight leukopenia on her last labs. She has not had frequent infections. No sweats or night sweats.       Allergies   Allergen Reactions   • Pcn [Penicillins]        Current medicines (including changes today)  No current outpatient prescriptions on file.     No current facility-administered medications for this visit.        She  has a past medical history of Hypothyroid and Perimenopausal vasomotor symptoms (5/6/2014).    Patient Active Problem List    Diagnosis Date Noted   • Lateral epicondylitis of right elbow 05/30/2018   • Anxiety disorder 02/14/2018   • Neck pain, acute 01/03/2018   • Herpes simplex infection 06/01/2017   • Inguinal lymphadenopathy 06/01/2017   • Rapid palpitations 12/05/2016   • Leukopenia 12/05/2016   • Vitamin D deficiency 08/17/2016   • Intrinsic eczema 07/29/2016   • Allergic rhinitis due to pollen 07/29/2016   • History of thyroid disease 10/14/2015   • Perimenopausal vasomotor symptoms 05/06/2014       ROS   No fever or chills.  No nausea or vomiting.  No chest pain or palpitations.  No cough or SOB.  No pain with urination  "or hematuria.  No black or bloody stools.       Objective:     Blood pressure 104/60, pulse 65, temperature 36.4 °C (97.5 °F), resp. rate 20, height 1.6 m (5' 3\"), weight 47.2 kg (104 lb), SpO2 95 %. Body mass index is 18.42 kg/m².   Physical Exam:  Well developed, well nourished.  Alert, oriented in no acute distress.  Eye contact is good, speech goal directed, affect calm  Eyes: conjunctiva non-injected, sclera non-icteric.  Ears: Pinna normal. TM pearly gray.   Nose: Nares are patent.  Normal mucosa  Mouth: Oral mucous membranes pink and moist with no lesions.  Neck Supple.  No adenopathy or masses in the neck or supraclavicular regions. No thyromegaly  Lungs: clear to auscultation bilaterally with good excursion. No wheezes or rhonchi  CV: regular rate and rhythm. No murmur  Abdomen: soft, nontender, no masses or organomegaly.  No rebound or guarding  Elbow: No deformity, swelling or bruising noted. Tenderness to palpation on lateral epicondyles and attached tendon. Full range of motion bilaterally. 2+ biceps, triceps and brachioradialis deep tendon reflexes bilaterally. 5/5 strength bilaterally.            Assessment and Plan:   The following treatment plan was discussed    1. Perimenopausal vasomotor symptoms  Recheck labs. Await results. Discussed treatment plan at that time  - FSH/LH; Future  - ESTRADIOL; Future    2. Leukopenia, unspecified type  Recheck CBC  - CBC WITH DIFFERENTIAL; Future    3. Vitamin D deficiency  Check vitamin D level and adjust supplementation as needed  - VITAMIN D,25 HYDROXY; Future    4. Screening for diabetes mellitus  Screening labs ordered.  Await results for counseling.    - COMP METABOLIC PANEL; Future    5. Screening for hyperlipidemia  Screening labs ordered.  Await results for counseling.    - LIPID PROFILE; Future    6. Screening for thyroid disorder  Screening labs ordered.  Await results for counseling.    - TSH; Future  - FREE THYROXINE; Future    7. Lateral " epicondylitis of right elbow  Ice to the area. NSAIDs. Discussed getting a brace for the tendon. Decrease repetitive motion activities. Use Cuisinart to help with this. If not improving refer to orthopedics    Any change or worsening of signs or symptoms, patient encouraged to follow-up or report to the emergency room for further evaluation. Patient understands and agrees.    Followup: Return if symptoms worsen or fail to improve.

## 2018-07-10 ENCOUNTER — OFFICE VISIT (OUTPATIENT)
Dept: MEDICAL GROUP | Facility: MEDICAL CENTER | Age: 48
End: 2018-07-10
Payer: COMMERCIAL

## 2018-07-10 VITALS
BODY MASS INDEX: 19.14 KG/M2 | SYSTOLIC BLOOD PRESSURE: 120 MMHG | HEART RATE: 66 BPM | RESPIRATION RATE: 20 BRPM | TEMPERATURE: 97.8 F | WEIGHT: 108 LBS | DIASTOLIC BLOOD PRESSURE: 60 MMHG | OXYGEN SATURATION: 96 % | HEIGHT: 63 IN

## 2018-07-10 DIAGNOSIS — Z12.31 ENCOUNTER FOR SCREENING MAMMOGRAM FOR BREAST CANCER: ICD-10-CM

## 2018-07-10 DIAGNOSIS — N95.1 PERIMENOPAUSAL VASOMOTOR SYMPTOMS: ICD-10-CM

## 2018-07-10 DIAGNOSIS — B00.9 HERPES SIMPLEX INFECTION: ICD-10-CM

## 2018-07-10 DIAGNOSIS — E55.9 VITAMIN D DEFICIENCY: ICD-10-CM

## 2018-07-10 DIAGNOSIS — R79.89 ELEVATED TSH: ICD-10-CM

## 2018-07-10 DIAGNOSIS — D72.819 LEUKOPENIA, UNSPECIFIED TYPE: ICD-10-CM

## 2018-07-10 PROCEDURE — 99214 OFFICE O/P EST MOD 30 MIN: CPT | Performed by: FAMILY MEDICINE

## 2018-07-10 RX ORDER — VALACYCLOVIR HYDROCHLORIDE 500 MG/1
500 TABLET, FILM COATED ORAL
COMMUNITY
Start: 2018-05-17 | End: 2020-08-26 | Stop reason: SDUPTHER

## 2018-07-10 RX ORDER — VALACYCLOVIR HYDROCHLORIDE 500 MG/1
500 TABLET, FILM COATED ORAL 2 TIMES DAILY
Qty: 14 TAB | Refills: 5 | Status: SHIPPED | OUTPATIENT
Start: 2018-07-10 | End: 2018-07-10 | Stop reason: SDUPTHER

## 2018-07-10 RX ORDER — VALACYCLOVIR HYDROCHLORIDE 500 MG/1
500 TABLET, FILM COATED ORAL 2 TIMES DAILY
Qty: 30 TAB | Refills: 3 | Status: SHIPPED | OUTPATIENT
Start: 2018-07-10 | End: 2019-04-25

## 2018-07-10 RX ORDER — ERGOCALCIFEROL 1.25 MG/1
50000 CAPSULE ORAL
Qty: 4 CAP | Refills: 6 | Status: SHIPPED | OUTPATIENT
Start: 2018-07-10 | End: 2019-04-25

## 2018-07-10 NOTE — PATIENT INSTRUCTIONS
Vitamin D Deficiency  Introduction  Vitamin D deficiency is when your body does not have enough vitamin D. Vitamin D is important because:  · It helps your body use other minerals that your body needs.  · It helps keep your bones strong and healthy.  · It may help to prevent some diseases.  · It helps your heart and other muscles work well.  You can get vitamin D by:  · Eating foods with vitamin D in them.  · Drinking or eating milk or other foods that have had vitamin D added to them.  · Taking a vitamin D supplement.  · Being in the sun.  Not getting enough vitamin D can make your bones become soft. It can also cause other health problems.  Follow these instructions at home:  · Take medicines and supplements only as told by your doctor.  · Eat foods that have vitamin D. These include:  ¨ Dairy products, cereals, or juices with added vitamin D. Check the label for vitamin D.  ¨ Fatty fish like salmon or trout.  ¨ Eggs.  ¨ Oysters.  · Do not use tanning beds.  · Stay at a healthy weight. Lose weight, if needed.  · Keep all follow-up visits as told by your doctor. This is important.  Contact a doctor if:  · Your symptoms do not go away.  · You feel sick to your stomach (nauseous).  · You throw up (vomit).  · You poop less often than usual or you have trouble pooping (constipation).  This information is not intended to replace advice given to you by your health care provider. Make sure you discuss any questions you have with your health care provider.  Document Released: 12/06/2012 Document Revised: 05/25/2017 Document Reviewed: 05/04/2016  © 2017 [employer]  Calcium Intake Recommendations  Calcium is a mineral that affects many functions in the body, including:  · Blood clotting.  · Blood vessel function.  · Nerve impulse conduction.  · Hormone secretion.  · Muscle contraction.  · Bone and teeth functions.  Most of your body's calcium supply is stored in your bones and teeth. When your calcium stores are low, you may  be at risk for low bone mass, bone loss, and bone fractures. Consuming enough calcium helps to grow healthy bones and teeth and to prevent breakdown over time.   It is very important that you get enough calcium if you are:  · A child undergoing rapid growth.  · An adolescent girl.  · A pre- or post-menopausal woman.  · A woman whose menstrual cycle has stopped due to anorexia nervosa or regular intense exercise.  · An individual with lactose intolerance or a milk allergy.  · A vegetarian.  WHAT IS MY PLAN?   Try to consume the recommended amount of calcium daily based on your age. Depending on your overall health, your health care provider may recommend increased calcium intake. General daily calcium intake recommendations by age are:  · Birth to 6 months: 200 mg.  · Infants 7 to 12 months: 260 mg.  · Children 1 to 3 years: 700 mg.  · Children 4 to 8 years: 1,000 mg.  · Children 9 to 13 years: 1,300 mg.  · Teens 14 to 18 years: 1,300 mg.  · Adults 19 to 50 years: 1,000 mg.  · Adult women 51 to 70 years: 1,200 mg.  · Adult men 51 to 70 years: 1,000 mg.  · Adults 71 years and older: 1,200 mg.  · Pregnant and breastfeeding teens: 1,300 mg.  · Pregnant and breastfeeding adults: 1,000 mg.  WHAT DO I NEED TO KNOW ABOUT CALCIUM INTAKE?  · In order for the body to absorb calcium, it needs vitamin D. You can get vitamin D through:  ¨ Direct exposure of the skin to sunlight.  ¨ Foods, such as egg yolks, liver, saltwater fish, and fortified milk.  ¨ Supplements.  · Consuming too much calcium may cause:  ¨ Constipation.  ¨ Decreased absorption of iron and zinc.  ¨ Kidney stones.  · Calcium supplements may interact with certain medicines. Check with your health care provider before starting any calcium supplements.  · Try to get most of your calcium from food.  WHAT FOODS CAN I EAT?  Grains  Fortified oatmeal. Fortified ready-to-eat cereals. Fortified frozen waffles.  Vegetables  Turnip greens. Broccoli.   Fruits  Fortified  orange juice.  Meats and Other Protein Sources  Canned sardines with bones. Canned salmon with bones. Soy beans. Tofu. Baked beans. Almonds. Brazil nuts. Sunflower seeds.  Dairy  Milk. Yogurt. Cheese. Cottage cheese.   Beverages  Fortified soy milk. Fortified rice milk.   Sweets/Desserts  Pudding. Ice Cream. Milkshakes. Blackstrap molasses.  The items listed above may not be a complete list of recommended foods or beverages. Contact your dietitian for more options.   WHAT FOODS CAN AFFECT MY CALCIUM INTAKE?  It may be more difficult for your body to use calcium or calcium may leave your body more quickly if you consume large amounts of:   · Sodium.  · Protein.  · Caffeine.  · Alcohol.     This information is not intended to replace advice given to you by your health care provider. Make sure you discuss any questions you have with your health care provider.     Document Released: 08/01/2005 Document Revised: 01/08/2016 Document Reviewed: 05/26/2015  [employer] Interactive Patient Education ©2016 [employer]

## 2018-07-16 NOTE — ASSESSMENT & PLAN NOTE
Patient had a slightly elevated TSH.  She reports she has had slight weight gain but no dry skin, constipation or palpitations.

## 2018-07-16 NOTE — ASSESSMENT & PLAN NOTE
Patient continues to have hot flashes.  She tried hormone therapy several years ago but she gained weight on it which she did not like.  She would like to just try homeopathic remedies at this time.

## 2018-07-16 NOTE — PROGRESS NOTES
Subjective:     Chief Complaint   Patient presents with   • Follow-Up     labs       Mikal Murillo is a 48 y.o. female here today for evaluation and management of:    Vitamin D deficiency  Patient has known vitamin D deficiency.Patient has known vitamin D deficiency.  She has not been taking vitamin D supplementation.      Perimenopausal vasomotor symptoms  Patient continues to have hot flashes.  She tried hormone therapy several years ago but she gained weight on it which she did not like.  She would like to just try homeopathic remedies at this time.    Leukopenia  Patient has a chronic low white blood cell count.This has been stable.  She does not have frequent infections.    Herpes simplex infection  Patient gets herpetic breakouts around her rectum.  The Valtrex is working well for this.  She takes that as soon as she feels tingling in the area.    Elevated TSH  Patient had a slightly elevated TSH.  She reports she has had slight weight gain but no dry skin, constipation or palpitations.       Allergies   Allergen Reactions   • Pcn [Penicillins]        Current medicines (including changes today)  Current Outpatient Prescriptions   Medication Sig Dispense Refill   • vitamin D, Ergocalciferol, (DRISDOL) 82685 units Cap capsule Take 1 Cap by mouth every 7 days. 4 Cap 6   • valACYclovir (VALTREX) 500 MG Tab Take 1 Tab by mouth 2 times a day. 30 Tab 3     No current facility-administered medications for this visit.        She  has a past medical history of Hypothyroid and Perimenopausal vasomotor symptoms (5/6/2014).    Patient Active Problem List    Diagnosis Date Noted   • Elevated TSH 07/10/2018   • Lateral epicondylitis of right elbow 05/30/2018   • Anxiety disorder 02/14/2018   • Neck pain, acute 01/03/2018   • Herpes simplex infection 06/01/2017   • Inguinal lymphadenopathy 06/01/2017   • Rapid palpitations 12/05/2016   • Leukopenia 12/05/2016   • Vitamin D deficiency 08/17/2016   • Intrinsic eczema 07/29/2016  "  • Allergic rhinitis due to pollen 07/29/2016   • History of thyroid disease 10/14/2015   • Perimenopausal vasomotor symptoms 05/06/2014       ROS   No fever or chills.  No nausea or vomiting.  No chest pain or palpitations.  No cough or SOB.  No pain with urination or hematuria.  No black or bloody stools.       Objective:     Blood pressure 120/60, pulse 66, temperature 36.6 °C (97.8 °F), resp. rate 20, height 1.6 m (5' 3\"), weight 49 kg (108 lb), SpO2 96 %. Body mass index is 19.13 kg/m².   Physical Exam:  Well developed, well nourished.  Alert, oriented in no acute distress.  Eye contact is good, speech goal directed, affect calm  Eyes: conjunctiva non-injected, sclera non-icteric.  Neck Supple.  No adenopathy or masses in the neck or supraclavicular regions. No thyromegaly  Lungs: clear to auscultation bilaterally with good excursion. No wheezes or rhonchi  CV: regular rate and rhythm. No murmur        Assessment and Plan:   The following treatment plan was discussed    1. Vitamin D deficiency  Start prescription vitamin D 50,000 units weekly. Continue for 6 months and then switched to over-the-counter 2000 units daily. Recheck labs in one month later.  - vitamin D, Ergocalciferol, (DRISDOL) 48339 units Cap capsule; Take 1 Cap by mouth every 7 days.  Dispense: 4 Cap; Refill: 6    2. Elevated TSH  Check thyroid labs  - TSH; Future  - FREE THYROXINE; Future  - TRIIDOTHYRONINE; Future    3. Perimenopausal vasomotor symptoms  OTC supplements discussed    4. Leukopenia, unspecified type  Stable.  Continue to monitor    5. Herpes simplex infection  Refill Valtrex for as needed use  - valACYclovir (VALTREX) 500 MG Tab; Take 1 Tab by mouth 2 times a day.  Dispense: 30 Tab; Refill: 3    6. Encounter for screening mammogram for breast cancer    - MA-SCREEN MAMMO W/CAD-BILAT; Future    Any change or worsening of signs or symptoms, patient encouraged to follow-up or report to the emergency room for further evaluation. " Patient understands and agrees.    Followup: Return in about 6 months (around 1/10/2019).

## 2018-07-16 NOTE — ASSESSMENT & PLAN NOTE
Patient has a chronic low white blood cell count.This has been stable.  She does not have frequent infections.

## 2018-07-16 NOTE — ASSESSMENT & PLAN NOTE
Patient gets herpetic breakouts around her rectum.  The Valtrex is working well for this.  She takes that as soon as she feels tingling in the area.

## 2018-07-16 NOTE — ASSESSMENT & PLAN NOTE
Patient has known vitamin D deficiency.Patient has known vitamin D deficiency.  She has not been taking vitamin D supplementation.

## 2018-11-02 ENCOUNTER — HOSPITAL ENCOUNTER (OUTPATIENT)
Dept: RADIOLOGY | Facility: MEDICAL CENTER | Age: 48
End: 2018-11-02
Attending: FAMILY MEDICINE
Payer: COMMERCIAL

## 2018-11-02 DIAGNOSIS — Z12.31 VISIT FOR SCREENING MAMMOGRAM: ICD-10-CM

## 2018-11-02 PROCEDURE — 77067 SCR MAMMO BI INCL CAD: CPT

## 2019-02-20 ENCOUNTER — OFFICE VISIT (OUTPATIENT)
Dept: MEDICAL GROUP | Facility: LAB | Age: 49
End: 2019-02-20
Payer: COMMERCIAL

## 2019-02-20 VITALS
OXYGEN SATURATION: 97 % | HEIGHT: 63 IN | TEMPERATURE: 97.1 F | SYSTOLIC BLOOD PRESSURE: 104 MMHG | DIASTOLIC BLOOD PRESSURE: 60 MMHG | WEIGHT: 110 LBS | RESPIRATION RATE: 20 BRPM | HEART RATE: 62 BPM | BODY MASS INDEX: 19.49 KG/M2

## 2019-02-20 DIAGNOSIS — R07.89 OTHER CHEST PAIN: ICD-10-CM

## 2019-02-20 DIAGNOSIS — E55.9 VITAMIN D DEFICIENCY: ICD-10-CM

## 2019-02-20 DIAGNOSIS — R42 DIZZINESS: ICD-10-CM

## 2019-02-20 DIAGNOSIS — K92.1 BLOOD IN STOOL: ICD-10-CM

## 2019-02-20 PROCEDURE — 99214 OFFICE O/P EST MOD 30 MIN: CPT | Performed by: FAMILY MEDICINE

## 2019-02-20 ASSESSMENT — PATIENT HEALTH QUESTIONNAIRE - PHQ9: CLINICAL INTERPRETATION OF PHQ2 SCORE: 0

## 2019-02-20 NOTE — ASSESSMENT & PLAN NOTE
About twice a week she gets a momentary sharp pain and rubbing it help. It's at the left costosternal border.   She gets what sound like PVC's.  She has had a normal EKG in the last year.

## 2019-02-20 NOTE — PATIENT INSTRUCTIONS
Premature Ventricular Contraction  A premature ventricular contraction (PVC) is a common irregularity in the normal heart rhythm. These contractions are extra heartbeats that start in the heart ventricles and occur too early in the normal sequence. During the PVC, the heart's normal electrical pathway is not used, so the beat is shorter and less effective. In most cases, these contractions come and go and do not require treatment.  What are the causes?  In many cases, the cause may not be known. Common causes of the condition include:  · Smoking.  · Drinking alcohol.  · Caffeine.  · Certain medicines.  · Some illegal drugs.  · Stress.  Certain medical conditions can also cause PVCs:  · Changes in minerals in the blood (electrolytes).  · Heart failure.  · Heart valve problems.  · Low blood oxygen levels or high carbon dioxide levels.  · Heart attack, or coronary artery disease.  What are the signs or symptoms?  The main symptom of this condition is a fast or skipped heartbeat (palpitations). Other symptoms include:  · Chest pain.  · Shortness of breath.  · Feeling tired.  · Dizziness.  In some cases, there are no symptoms.  How is this diagnosed?  This condition may be diagnosed based on:  · Your medical history.  · A physical exam. During the exam, the health care provider will check for irregular heartbeats.  · Tests, such as:  ¨ An ECG (electrocardiogram) to monitor the electrical activity of your heart.  ¨ Holter monitor testing. This involves wearing a device that clips to your clothing and monitors the electrical activity of your heart over longer periods of time.  ¨ Stress tests to see how exercise affects your heart rhythm and blood supply.  ¨ Echocardiogram. This test uses sound waves (ultrasound) to produce an image of your heart.  ¨ Electrophysiology study. This test checks the electric pathways in your heart.  How is this treated?  Treatment depends on any underlying conditions, the type of PVCs that you  are having, and how much the symptoms are interfering with your daily life.  Possible treatments include:  · Avoiding things that can trigger the premature contractions, such as caffeine or alcohol.  · Medicines. These may be given if symptoms are severe or if the extra heartbeats are frequent.  · Treatment for any underlying condition that is found to be the cause of the contractions.  · Catheter ablation. This procedure destroys the heart tissues that send abnormal signals.  In some cases, no treatment is required.  Follow these instructions at home:  Lifestyle  Follow these instructions as told by your health care provider:  · Do not use any products that contain nicotine or tobacco, such as cigarettes and e-cigarettes. If you need help quitting, ask your health care provider.  · If caffeine triggers episodes of PVC, do not eat, drink, or use anything with caffeine in it.  · If caffeine does not seem to trigger episodes, consume caffeine in moderation.  · If alcohol triggers episodes of PVC, do not drink alcohol.  · If alcohol does not seem to trigger episodes, limit alcohol intake to no more than 1 drink a day for nonpregnant women and 2 drinks a day for men. One drink equals 12 oz of beer, 5 oz of wine, or 1½ oz of hard liquor.  · Exercise regularly. Ask your health care provider what type of exercise is safe for you.  · Find healthy ways to manage stress. Avoid stressful situations when possible.  · Try to get at least 7-9 hours of sleep each night, or as much as recommended by your health care provider.  · Do not use illegal drugs.  General instructions  · Take over-the-counter and prescription medicines only as told by your health care provider.  · Keep all follow-up visits as told by your health care provider. This is important.  Get help right away if:  · You feel palpitations that are frequent or continual.  · You have chest pain.  · You have shortness of breath.  · You have sweating for no reason.  · You  have nausea and vomiting.  · You become light-headed or you faint.  This information is not intended to replace advice given to you by your health care provider. Make sure you discuss any questions you have with your health care provider.  Document Released: 08/04/2005 Document Revised: 08/11/2017 Document Reviewed: 05/24/2017  [employer] Interactive Patient Education © 2017 [employer]    Dehydration, Adult  Dehydration is a condition in which there is not enough fluid or water in the body. This happens when you lose more fluids than you take in. Important organs, such as the kidneys, brain, and heart, cannot function without a proper amount of fluids. Any loss of fluids from the body can lead to dehydration.  Dehydration can range from mild to severe. This condition should be treated right away to prevent it from becoming severe.  What are the causes?  This condition may be caused by:  · Vomiting.  · Diarrhea.  · Excessive sweating, such as from heat exposure or exercise.  · Not drinking enough fluid, especially:  ¨ When ill.  ¨ While doing activity that requires a lot of energy.  · Excessive urination.  · Fever.  · Infection.  · Certain medicines, such as medicines that cause the body to lose excess fluid (diuretics).  · Inability to access safe drinking water.  · Reduced physical ability to get adequate water and food.  What increases the risk?  This condition is more likely to develop in people:  · Who have a poorly controlled long-term (chronic) illness, such as diabetes, heart disease, or kidney disease.  · Who are age 65 or older.  · Who are disabled.  · Who live in a place with high altitude.  · Who play endurance sports.  What are the signs or symptoms?  Symptoms of mild dehydration may include:  · Thirst.  · Dry lips.  · Slightly dry mouth.  · Dry, warm skin.  · Dizziness.  Symptoms of moderate dehydration may include:  · Very dry mouth.  · Muscle cramps.  · Dark urine. Urine may be the color of  tea.  · Decreased urine production.  · Decreased tear production.  · Heartbeat that is irregular or faster than normal (palpitations).  · Headache.  · Light-headedness, especially when you stand up from a sitting position.  · Fainting (syncope).  Symptoms of severe dehydration may include:  · Changes in skin, such as:  ¨ Cold and clammy skin.  ¨ Blotchy (mottled) or pale skin.  ¨ Skin that does not quickly return to normal after being lightly pinched and released (poor skin turgor).  · Changes in body fluids, such as:  ¨ Extreme thirst.  ¨ No tear production.  ¨ Inability to sweat when body temperature is high, such as in hot weather.  ¨ Very little urine production.  · Changes in vital signs, such as:  ¨ Weak pulse.  ¨ Pulse that is more than 100 beats a minute when sitting still.  ¨ Rapid breathing.  ¨ Low blood pressure.  · Other changes, such as:  ¨ Sunken eyes.  ¨ Cold hands and feet.  ¨ Confusion.  ¨ Lack of energy (lethargy).  ¨ Difficulty waking up from sleep.  ¨ Short-term weight loss.  ¨ Unconsciousness.  How is this diagnosed?  This condition is diagnosed based on your symptoms and a physical exam. Blood and urine tests may be done to help confirm the diagnosis.  How is this treated?  Treatment for this condition depends on the severity. Mild or moderate dehydration can often be treated at home. Treatment should be started right away. Do not wait until dehydration becomes severe. Severe dehydration is an emergency and it needs to be treated in a hospital.  Treatment for mild dehydration may include:  · Drinking more fluids.  · Replacing salts and minerals in your blood (electrolytes) that you may have lost.  Treatment for moderate dehydration may include:  · Drinking an oral rehydration solution (ORS). This is a drink that helps you replace fluids and electrolytes (rehydrate). It can be found at pharmacies and retail stores.  Treatment for severe dehydration may include:  · Receiving fluids through an IV  tube.  · Receiving an electrolyte solution through a feeding tube that is passed through your nose and into your stomach (nasogastric tube, or NG tube).  · Correcting any abnormalities in electrolytes.  · Treating the underlying cause of dehydration.  Follow these instructions at home:  · If directed by your health care provider, drink an ORS:  ¨ Make an ORS by following instructions on the package.  ¨ Start by drinking small amounts, about ½ cup (120 mL) every 5-10 minutes.  ¨ Slowly increase how much you drink until you have taken the amount recommended by your health care provider.  · Drink enough clear fluid to keep your urine clear or pale yellow. If you were told to drink an ORS, finish the ORS first, then start slowly drinking other clear fluids. Drink fluids such as:  ¨ Water. Do not drink only water. Doing that can lead to having too little salt (sodium) in the body (hyponatremia).  ¨ Ice chips.  ¨ Fruit juice that you have added water to (diluted fruit juice).  ¨ Low-calorie sports drinks.  · Avoid:  ¨ Alcohol.  ¨ Drinks that contain a lot of sugar. These include high-calorie sports drinks, fruit juice that is not diluted, and soda.  ¨ Caffeine.  ¨ Foods that are greasy or contain a lot of fat or sugar.  · Take over-the-counter and prescription medicines only as told by your health care provider.  · Do not take sodium tablets. This can lead to having too much sodium in the body (hypernatremia).  · Eat foods that contain a healthy balance of electrolytes, such as bananas, oranges, potatoes, tomatoes, and spinach.  · Keep all follow-up visits as told by your health care provider. This is important.  Contact a health care provider if:  · You have abdominal pain that:  ¨ Gets worse.  ¨ Stays in one area (localizes).  · You have a rash.  · You have a stiff neck.  · You are more irritable than usual.  · You are sleepier or more difficult to wake up than usual.  · You feel weak or dizzy.  · You feel very  thirsty.  · You have urinated only a small amount of very dark urine over 6-8 hours.  Get help right away if:  · You have symptoms of severe dehydration.  · You cannot drink fluids without vomiting.  · Your symptoms get worse with treatment.  · You have a fever.  · You have a severe headache.  · You have vomiting or diarrhea that:  ¨ Gets worse.  ¨ Does not go away.  · You have blood or green matter (bile) in your vomit.  · You have blood in your stool. This may cause stool to look black and tarry.  · You have not urinated in 6-8 hours.  · You faint.  · Your heart rate while sitting still is over 100 beats a minute.  · You have trouble breathing.  This information is not intended to replace advice given to you by your health care provider. Make sure you discuss any questions you have with your health care provider.  Document Released: 12/18/2006 Document Revised: 07/14/2017 Document Reviewed: 02/10/2017  [employer] Interactive Patient Education © 2017 [employer]

## 2019-02-23 NOTE — ASSESSMENT & PLAN NOTE
Patient noticed some light red blood in her stools.  She had eaten some dragonfruit in the previous few days.  She does get occasional abdominal pain.  She has never had a colonoscopy.  She does not believe there is any family history of colon cancer.

## 2019-02-23 NOTE — PROGRESS NOTES
Subjective:     Chief Complaint   Patient presents with   • Sleep Disruption     night sweats, SOB   • Bloody Stools     x 3 days; no hx of hemorrhoids, might be due to a dragon fruit she ate       Mikal Murillo is a 48 y.o. female here today for evaluation and management of:    Other chest pain  About twice a week she gets a momentary sharp pain and rubbing it help. It's at the left costosternal border.   She gets what sound like PVC's.  She has had a normal EKG in the last year.    Blood in stool  Patient noticed some light red blood in her stools.  She had eaten some dragonfruit in the previous few days.  She does get occasional abdominal pain.  She has never had a colonoscopy.  She does not believe there is any family history of colon cancer.    Dizziness  Complains of occasional dizziness described as a lightheaded sensation  Occurs with  rising from a supine position  Denies otalgia, otorrhea, tinnitus, hearing loss   Patient does have lower blood pressure.         Allergies   Allergen Reactions   • Pcn [Penicillins]        Current medicines (including changes today)  Current Outpatient Prescriptions   Medication Sig Dispense Refill   • vitamin D, Ergocalciferol, (DRISDOL) 19215 units Cap capsule Take 1 Cap by mouth every 7 days. 4 Cap 6   • valACYclovir (VALTREX) 500 MG Tab Take 1 Tab by mouth 2 times a day. 30 Tab 3     No current facility-administered medications for this visit.        She  has a past medical history of Hypothyroid and Perimenopausal vasomotor symptoms (5/6/2014).    Patient Active Problem List    Diagnosis Date Noted   • Other chest pain 02/20/2019   • Dizziness 02/20/2019   • Blood in stool 02/20/2019   • Elevated TSH 07/10/2018   • Lateral epicondylitis of right elbow 05/30/2018   • Anxiety disorder 02/14/2018   • Neck pain, acute 01/03/2018   • Herpes simplex infection 06/01/2017   • Inguinal lymphadenopathy 06/01/2017   • Rapid palpitations 12/05/2016   • Leukopenia 12/05/2016   •  "Vitamin D deficiency 08/17/2016   • Intrinsic eczema 07/29/2016   • Allergic rhinitis due to pollen 07/29/2016   • History of thyroid disease 10/14/2015   • Perimenopausal vasomotor symptoms 05/06/2014       ROS   No fever or chills.  No nausea or vomiting.    No cough or SOB.  No pain with urination or hematuria.  No black or bloody stools.       Objective:     Blood pressure 104/60, pulse 62, temperature 36.2 °C (97.1 °F), temperature source Temporal, resp. rate 20, height 1.6 m (5' 3\"), weight 49.9 kg (110 lb), SpO2 97 %, not currently breastfeeding. Body mass index is 19.49 kg/m².   Physical Exam:  Well developed, well nourished.  Alert, oriented in no acute distress.  Eye contact is good, speech goal directed, affect calm  Eyes: conjunctiva non-injected, sclera non-icteric. PERRL. EOMs intact.  No nystagmus  Ears: Pinna normal. TM pearly gray.   Nose: Nares are patent.  Normal mucosa  Mouth: Oral mucous membranes pink and moist with no lesions.  Neck Supple.  No adenopathy or masses in the neck or supraclavicular regions. No thyromegaly  Lungs: clear to auscultation bilaterally with good excursion. No wheezes or rhonchi  CV: regular rate and rhythm. No murmur  Chest wall tender at left lower costosternal border.    Neurological - CN 2-12 grossly intact.  Normal gait.  Normal strength and sensation          Assessment and Plan:   The following treatment plan was discussed    1. Other chest pain  Reassured.  Showed patient diagrams of chest wall structure.  This is most likely muscular or arthritis.  Reassured    2. Dizziness  May be from lower blood pressure.  Discussed standing slowly and increasing her sodium in her diet    3. Vitamin D deficiency  Check vitamin D level and adjust supplementation as needed  - VITAMIN D 25-HYDROXY    4. Blood in stool  This may be dietary so patient will continue to monitor.  Discussed that the new screening age may become 45 so we will refer to GI  - REFERRAL TO " GASTROENTEROLOGY    Any change or worsening of signs or symptoms, patient encouraged to follow-up or report to the emergency room for further evaluation. Patient understands and agrees.    Followup: Return if symptoms worsen or fail to improve.

## 2019-02-23 NOTE — ASSESSMENT & PLAN NOTE
Complains of occasional dizziness described as a lightheaded sensation  Occurs with  rising from a supine position  Denies otalgia, otorrhea, tinnitus, hearing loss   Patient does have lower blood pressure.

## 2019-03-28 ENCOUNTER — OFFICE VISIT (OUTPATIENT)
Dept: URGENT CARE | Facility: CLINIC | Age: 49
End: 2019-03-28
Payer: COMMERCIAL

## 2019-03-28 VITALS
RESPIRATION RATE: 16 BRPM | WEIGHT: 109 LBS | DIASTOLIC BLOOD PRESSURE: 80 MMHG | HEART RATE: 64 BPM | SYSTOLIC BLOOD PRESSURE: 106 MMHG | TEMPERATURE: 97.2 F | BODY MASS INDEX: 19.31 KG/M2 | OXYGEN SATURATION: 98 % | HEIGHT: 63 IN

## 2019-03-28 DIAGNOSIS — K29.00 ACUTE GASTRITIS WITHOUT HEMORRHAGE, UNSPECIFIED GASTRITIS TYPE: ICD-10-CM

## 2019-03-28 DIAGNOSIS — R10.13 ABDOMINAL DISCOMFORT, EPIGASTRIC: ICD-10-CM

## 2019-03-28 PROCEDURE — 99214 OFFICE O/P EST MOD 30 MIN: CPT | Performed by: NURSE PRACTITIONER

## 2019-03-28 ASSESSMENT — ENCOUNTER SYMPTOMS
FEVER: 0
DIARRHEA: 0
DIZZINESS: 0
NAUSEA: 0
BACK PAIN: 0
CONSTIPATION: 0
HEADACHES: 0
VOMITING: 0
COUGH: 0
CHILLS: 0
NECK PAIN: 0

## 2019-03-28 ASSESSMENT — LIFESTYLE VARIABLES: SUBSTANCE_ABUSE: 0

## 2019-03-28 NOTE — PATIENT INSTRUCTIONS
Gastritis, Adult  Gastritis is soreness and swelling (inflammation) of the lining of the stomach. Gastritis can develop as a sudden onset (acute) or long-term (chronic) condition. If gastritis is not treated, it can lead to stomach bleeding and ulcers.  CAUSES   Gastritis occurs when the stomach lining is weak or damaged. Digestive juices from the stomach then inflame the weakened stomach lining. The stomach lining may be weak or damaged due to viral or bacterial infections. One common bacterial infection is the Helicobacter pylori infection. Gastritis can also result from excessive alcohol consumption, taking certain medicines, or having too much acid in the stomach.   SYMPTOMS   In some cases, there are no symptoms. When symptoms are present, they may include:  · Pain or a burning sensation in the upper abdomen.  · Nausea.  · Vomiting.  · An uncomfortable feeling of fullness after eating.  DIAGNOSIS   Your caregiver may suspect you have gastritis based on your symptoms and a physical exam. To determine the cause of your gastritis, your caregiver may perform the following:  · Blood or stool tests to check for the H pylori bacterium.  · Gastroscopy. A thin, flexible tube (endoscope) is passed down the esophagus and into the stomach. The endoscope has a light and camera on the end. Your caregiver uses the endoscope to view the inside of the stomach.  · Taking a tissue sample (biopsy) from the stomach to examine under a microscope.  TREATMENT   Depending on the cause of your gastritis, medicines may be prescribed. If you have a bacterial infection, such as an H pylori infection, antibiotics may be given. If your gastritis is caused by too much acid in the stomach, H2 blockers or antacids may be given. Your caregiver may recommend that you stop taking aspirin, ibuprofen, or other nonsteroidal anti-inflammatory drugs (NSAIDs).  HOME CARE INSTRUCTIONS  · Only take over-the-counter or prescription medicines as directed by  your caregiver.  · If you were given antibiotic medicines, take them as directed. Finish them even if you start to feel better.  · Drink enough fluids to keep your urine clear or pale yellow.  · Avoid foods and drinks that make your symptoms worse, such as:  ¨ Caffeine or alcoholic drinks.  ¨ Chocolate.  ¨ Peppermint or mint flavorings.  ¨ Garlic and onions.  ¨ Spicy foods.  ¨ Citrus fruits, such as oranges, barry, or limes.  ¨ Tomato-based foods such as sauce, chili, salsa, and pizza.  ¨ Fried and fatty foods.  · Eat small, frequent meals instead of large meals.  · Over the counter Omeprazole  - take 1 capsule twice a day for 4 days then 1 capsule once a day for 7 days.   · Over the counter Ibuprofen 400 mg  ( 2 tablets) up to three times a day as needed for pain. Take with food.   SEEK IMMEDIATE MEDICAL CARE IF:   · You have black or dark red stools.  · You vomit blood or material that looks like coffee grounds.  · You are unable to keep fluids down.  · Your abdominal pain gets worse.  · You have a fever.  · You do not feel better after 1 week.  · You have any other questions or concerns.  MAKE SURE YOU:  · Understand these instructions.  · Will watch your condition.  · Will get help right away if you are not doing well or get worse.  This information is not intended to replace advice given to you by your health care provider. Make sure you discuss any questions you have with your health care provider.  Document Released: 12/12/2002 Document Revised: 06/18/2013 Document Reviewed: 09/10/2016  [employer] Interactive Patient Education © 2017 [employer]

## 2019-03-28 NOTE — PROGRESS NOTES
"Subjective:      Mikal Murillo is a 48 y.o. female who presents with Abdominal Pain (x 2 days, upper abdominal pain)     Denies past medical, surgical or family history that is significant to today's problem.   RX or OTC medications-- reviewed with patient today.   Allergies   Allergen Reactions   • Pcn [Penicillins]              HPI This is a new problem. Mikal Murillo is a 47 y/o female with c/o epigastric discomfort x 2 days. She has not changed her diet recently. Does eat spicy foods frequently. Denies increased stress. No nausea, vomiting , diarrhea or constipation. Pain 3/10. Burning sensation, intermittent. Worse at night when she lays down. Burping more than usual. No other aggravating or alleviating factors.       Review of Systems   Constitutional: Negative for chills and fever.   Respiratory: Negative for cough.    Gastrointestinal: Negative for constipation, diarrhea, nausea and vomiting.   Genitourinary: Negative for dysuria and urgency.   Musculoskeletal: Negative for back pain and neck pain.   Neurological: Negative for dizziness and headaches.   Endo/Heme/Allergies: Negative for environmental allergies.   Psychiatric/Behavioral: Negative for substance abuse.          Objective:     /80 (BP Location: Left arm, Patient Position: Sitting, BP Cuff Size: Adult)   Pulse 64   Temp 36.2 °C (97.2 °F) (Temporal)   Resp 16   Ht 1.6 m (5' 3\")   Wt 49.4 kg (109 lb)   SpO2 98%   BMI 19.31 kg/m²      Physical Exam   Constitutional: She is oriented to person, place, and time. Vital signs are normal. She appears well-developed and well-nourished. She is cooperative.  Non-toxic appearance. She does not have a sickly appearance. She appears distressed.   HENT:   Head: Normocephalic.   Right Ear: Hearing normal.   Left Ear: Hearing normal.   Mouth/Throat: Uvula is midline and oropharynx is clear and moist.   Eyes: Pupils are equal, round, and reactive to light. Lids are normal.   Neck: Trachea normal, normal " range of motion, full passive range of motion without pain and phonation normal.   Cardiovascular: Normal rate and regular rhythm.    Pulmonary/Chest: Effort normal and breath sounds normal.   Abdominal: Soft. Normal appearance and bowel sounds are normal. There is no hepatosplenomegaly. There is tenderness in the epigastric area. There is no rigidity, no rebound, no guarding, no CVA tenderness, no tenderness at McBurney's point and negative Warren's sign.   Lymphadenopathy:     She has no cervical adenopathy.        Right: No supraclavicular adenopathy present.        Left: No supraclavicular adenopathy present.   Neurological: She is alert and oriented to person, place, and time.   Skin: Skin is warm and dry. No rash noted.   Psychiatric: She has a normal mood and affect. Her speech is normal and behavior is normal. Cognition and memory are normal.   Nursing note and vitals reviewed.              Assessment/Plan:     1. Acute gastritis without hemorrhage, unspecified gastritis type     2. Abdominal discomfort, epigastric         [place] diet. No greasy , fatty or spicy foods.   OTC omeprazole.   nsaids of choice with food.     Return to clinic or PCP  7-10 ays if current symptoms are not resolving in a satisfactory manner or sooner if new or worsening symptoms occur.   Patient was advised of signs and symptoms which would warrant further evaluation and /or emergent evaluation in ER.  Verbalized agreement with this treatment plan and seemed to understand without barriers. Questions were encouraged and answered to patients satisfaction.     Aftercare instructions were given to pt

## 2019-04-25 ENCOUNTER — OFFICE VISIT (OUTPATIENT)
Dept: MEDICAL GROUP | Facility: LAB | Age: 49
End: 2019-04-25
Payer: COMMERCIAL

## 2019-04-25 VITALS
HEIGHT: 63 IN | TEMPERATURE: 97.7 F | OXYGEN SATURATION: 97 % | SYSTOLIC BLOOD PRESSURE: 100 MMHG | WEIGHT: 108.03 LBS | HEART RATE: 66 BPM | RESPIRATION RATE: 20 BRPM | DIASTOLIC BLOOD PRESSURE: 60 MMHG | BODY MASS INDEX: 19.14 KG/M2

## 2019-04-25 DIAGNOSIS — R30.0 DYSURIA: ICD-10-CM

## 2019-04-25 DIAGNOSIS — E16.2 HYPOGLYCEMIA: ICD-10-CM

## 2019-04-25 DIAGNOSIS — J30.1 SEASONAL ALLERGIC RHINITIS DUE TO POLLEN: ICD-10-CM

## 2019-04-25 DIAGNOSIS — L81.9 CHANGING PIGMENTED SKIN LESION: ICD-10-CM

## 2019-04-25 PROCEDURE — 99214 OFFICE O/P EST MOD 30 MIN: CPT | Performed by: FAMILY MEDICINE

## 2019-04-25 RX ORDER — MULTIVIT-MIN/IRON/FOLIC ACID/K 18-600-40
CAPSULE ORAL
COMMUNITY
End: 2019-08-22

## 2019-04-25 NOTE — PATIENT INSTRUCTIONS
Protein Content in Foods  Generally, most healthy people need around 50 grams of protein each day. Depending on your overall health, you may need more or less protein in your diet. Talk to your health care provider or dietitian about how much protein you need.  See the following list for the protein content of some common foods.  High-protein foods  High-protein foods contain 4 grams (4 g) or more of protein per serving. They include:  Beef, ground sirloin (cooked) -- 3 oz have 24 g of protein.  Cheese (hard) -- 1 oz has 7 g of protein.  Chicken breast, boneless and skinless (cooked) -- 3 oz have 13.4 g of protein.  Cottage cheese -- 1/2 cup has 13.4 g of protein.  Egg -- 1 egg has 6 g of protein.  Fish, filet (cooked) -- 1 oz has 6-7 g of protein.  Garbanzo beans (canned or cooked) -- 1/2 cup has 6-7 g of protein.  Kidney beans (canned or cooked) -- 1/2 cup has 6-7 g of protein.  Lamb (cooked) -- 3 oz has 24 g of protein.  Milk -- 1 cup (8 oz) has 8 g of protein.  Nuts (peanuts, pistachios, almonds) -- 1 oz has 6 g of protein.  Peanut butter -- 1 oz has 7-8 g of protein.  Pork tenderloin (cooked) -- 3 oz has 18.4 g of protein.  Pumpkin seeds -- 1 oz has 8.5 g of protein.  Soybeans (roasted) -- 1 oz has 8 g of protein.  Soybeans (cooked) -- 1/2 cup has 11 g of protein.  Soy milk -- 1 cup (8 oz) has 5-10 g of protein.  Soy or vegetable laina -- 1 laina has 11 g of protein.  Sunflower seeds -- 1 oz has 5.5 g of protein.  Tofu (firm) -- 1/2 cup has 20 g of protein.  Tuna (canned in water) -- 3 oz has 20 g of protein.  Yogurt -- 6 oz has 8 g of protein.  Low-protein foods  Low-protein foods contain 3 grams (3 g) or less of protein per serving. They include:  Beets (raw or cooked) -- 1/2 cup has 1.5 g of protein.  Bran cereal -- 1/2 cup has 2-3 g of protein.  Bread -- 1 slice has 2.5 g of protein.  Broccoli (raw or cooked) -- 1/2 cup has 2 g of protein.  Lorenza greens (raw or cooked) -- 1/2 cup has 2 g of protein.  Corn  (fresh or cooked) -- 1/2 cup has 2 g of protein.  Cream cheese -- 1 oz has 2 g of protein.  Creamer (half-and-half) -- 1 oz has 1 g of protein.  Flour tortilla -- 1 tortilla has 2.5 g of protein  Frozen yogurt -- 1/2 cup has 3 g of protein.  Fruit or vegetable juice -- 1/2 cup has 1 g of protein.  Green beans (raw or cooked) -- 1/2 cup has 1 g of protein.  Green peas (canned) -- 1/2 cup has 3.5 g of protein.  Muffins -- 1 small muffin (2 oz) has 3 g of protein.  Oatmeal (cooked) -- 1/2 cup has 3 g of protein.  Potato (baked with skin) -- 1 medium potato has 3 g of protein.  Rice (cooked) -- 1/2 cup has 2.5-3.5 g of protein.  Sour cream -- 1/2 cup has 2.5 g of protein.  Spinach (cooked) -- 1/2 cup has 3 g of protein.  Squash (cooked) -- 1/2 cup has 1.5 g of protein.  Actual amounts of protein may be different depending on processing. Talk with your health care provider or dietitian about what foods are recommended for you.  This information is not intended to replace advice given to you by your health care provider. Make sure you discuss any questions you have with your health care provider.  Document Released: 03/18/2017 Document Revised: 08/28/2017 Document Reviewed: 08/28/2017  [employer] Interactive Patient Education © 2017 [employer]  Hypoglycemia  Hypoglycemia occurs when the level of sugar (glucose) in the blood is too low. Glucose is a type of sugar that provides the body's main source of energy. Certain hormones (insulin and glucagon) control the level of glucose in the blood. Insulin lowers blood glucose, and glucagon increases blood glucose. Hypoglycemia can result from having too much insulin in the bloodstream, or from not eating enough food that contains glucose.  Hypoglycemia can happen in people who do or do not have diabetes. It can develop quickly, and it can be a medical emergency.  What are the causes?  Hypoglycemia occurs most often in people who have diabetes. If you have diabetes, hypoglycemia  may be caused by:  · Diabetes medicine.  · Not eating enough, or not eating often enough.  · Increased physical activity.  · Drinking alcohol, especially when you have not eaten recently.  If you do not have diabetes, hypoglycemia may be caused by:  · A tumor in the pancreas. The pancreas is the organ that makes insulin.  · Not eating enough, or not eating for long periods at a time (fasting).  · Severe infection or illness that affects the liver, heart, or kidneys.  · Certain medicines.  You may also have reactive hypoglycemia. This condition causes hypoglycemia within 4 hours of eating a meal. This may occur after having stomach surgery. Sometimes, the cause of reactive hypoglycemia is not known.  What increases the risk?  Hypoglycemia is more likely to develop in:  · People who have diabetes and take medicines to lower blood glucose.  · People who abuse alcohol.  · People who have a severe illness.  What are the signs or symptoms?  Hypoglycemia may not cause any symptoms. If you have symptoms, they may include:  · Hunger.  · Anxiety.  · Sweating and feeling clammy.  · Confusion.  · Dizziness or feeling light-headed.  · Sleepiness.  · Nausea.  · Increased heart rate.  · Headache.  · Blurry vision.  · Seizure.  · Nightmares.  · Tingling or numbness around the mouth, lips, or tongue.  · A change in speech.  · Decreased ability to concentrate.  · A change in coordination.  · Restless sleep.  · Tremors or shakes.  · Fainting.  · Irritability.  How is this diagnosed?  Hypoglycemia is diagnosed with a blood test to measure your blood glucose level. This blood test is done while you are having symptoms. Your health care provider may also do a physical exam and review your medical history.  If you do not have diabetes, other tests may be done to find the cause of your hypoglycemia.  How is this treated?  This condition can often be treated by immediately eating or drinking something that contains glucose, such as:  · 3-4  sugar tablets (glucose pills).  · Glucose gel, 15-gram tube.  · Fruit juice, 4 oz (120 mL).  · Regular soda (not diet soda), 4 oz (120 mL).  · Low-fat milk, 4 oz (120 mL).  · Several pieces of hard candy.  · Sugar or honey, 1 Tbsp.  Treating Hypoglycemia If You Have Diabetes   If you are alert and able to swallow safely, follow the 15:15 rule:  · Take 15 grams of a rapid-acting carbohydrate. Rapid-acting options include:  ¨ 1 tube of glucose gel.  ¨ 3 glucose pills.  ¨ 6-8 pieces of hard candy.  ¨ 4 oz (120 mL) of fruit juice.  ¨ 4 oz (120 ml) of regular (not diet) soda.  · Check your blood glucose 15 minutes after you take the carbohydrate.  · If the repeat blood glucose level is still at or below 70 mg/dL (3.9 mmol/L), take 15 grams of a carbohydrate again.  · If your blood glucose level does not increase above 70 mg/dL (3.9 mmol/L) after 3 tries, seek emergency medical care.  · After your blood glucose level returns to normal, eat a meal or a snack within 1 hour.  Treating Severe Hypoglycemia   Severe hypoglycemia is when your blood glucose level is at or below 54 mg/dL (3 mmol/L). Severe hypoglycemia is an emergency. Do not wait to see if the symptoms will go away. Get medical help right away. Call your local emergency services (911 in the [place] Do not drive yourself to the hospital.  If you have severe hypoglycemia and you cannot eat or drink, you may need an injection of glucagon. A family member or close friend should learn how to check your blood glucose and how to give you a glucagon injection. Ask your health care provider if you need to have an emergency glucagon injection kit available.  Severe hypoglycemia may need to be treated in a hospital. The treatment may include getting glucose through an IV tube. You may also need treatment for the cause of your hypoglycemia.  Follow these instructions at home:  General instructions  · Avoid any diets that cause you to not eat enough food. Talk with your health  care provider before you start any new diet.  · Take over-the-counter and prescription medicines only as told by your health care provider.  · Limit alcohol intake to no more than 1 drink per day for nonpregnant women and 2 drinks per day for men. One drink equals 12 oz of beer, 5 oz of wine, or 1½ oz of hard liquor.  · Keep all follow-up visits as told by your health care provider. This is important.  If You Have Diabetes:   · Make sure you know the symptoms of hypoglycemia.  · Always have a rapid-acting carbohydrate snack with you to treat low blood sugar.  · Follow your diabetes management plan, as told by your health care provider. Make sure you:  ¨ Take your medicines as directed.  ¨ Follow your exercise plan.  ¨ Follow your meal plan. Eat on time, and do not skip meals.  ¨ Check your blood glucose as often as directed. Make sure to check your blood glucose before and after exercise. If you exercise longer or in a different way than usual, check your blood glucose more often.  ¨ Follow your sick day plan whenever you cannot eat or drink normally. Make this plan in advance with your health care provider.  · Share your diabetes management plan with people in your workplace, school, and household.  · Check your urine for ketones when you are ill and as told by your health care provider.  · Carry a medical alert card or wear medical alert jewelry.  If You Have Reactive Hypoglycemia or Low Blood Sugar From Other Causes:  · Monitor your blood glucose as told by your health care provider.  · Follow instructions from your health care provider about eating or drinking restrictions.  Contact a health care provider if:  · You have problems keeping your blood glucose in your target range.  · You have frequent episodes of hypoglycemia.  Get help right away if:  · You continue to have hypoglycemia symptoms after eating or drinking something containing glucose.  · Your blood glucose is at or below 54 mg/dL (3 mmol/L).  · You  have a seizure.  · You faint.  These symptoms may represent a serious problem that is an emergency. Do not wait to see if the symptoms will go away. Get medical help right away. Call your local emergency services (911 in the [place] Do not drive yourself to the hospital.   This information is not intended to replace advice given to you by your health care provider. Make sure you discuss any questions you have with your health care provider.  Document Released: 12/18/2006 Document Revised: 05/31/2017 Document Reviewed: 01/20/2017  [employer] Interactive Patient Education © 2017 [employer]  Cranberry chew tabs, tablets, or capsules  What is this medicine?  Cranberry (KRAN beri) is a dietary supplement. It is promoted to maintain urinary tract health. The FDA has not approved this supplement for any medical use.  This supplement may be used for other purposes; ask your health care provider or pharmacist if you have questions.  This medicine may be used for other purposes; ask your health care provider or pharmacist if you have questions.  COMMON BRAND NAME(S): AZO Cranberry, Ellura, TheraCran, TheraCran HP, TheraCran HP for Kids, TheraCran One  What should I tell my health care provider before I take this medicine?  They need to know if you have any of these conditions:  -diabetes  -kidney stones  -lung or breathing disease, like asthma  -stomach or intestine problems  -an unusual or allergic reaction to cranberry, other herbs or plants, aspirin, other medicines, foods, dyes, or preservatives  -pregnant or trying to get pregnant  -breast-feeding  How should I use this medicine?  Take this supplement by mouth with a glass of water. Some tablets may be chewed before swallowing. Follow the directions on the package labeling or take as directed by your health care professional. Do not take this supplement more often than directed.  Contact your pediatrician or health care professional regarding the use of this supplement  in children. Special care may be needed.  Overdosage: If you think you have taken too much of this medicine contact a poison control center or emergency room at once.  NOTE: This medicine is only for you. Do not share this medicine with others.  What if I miss a dose?  If you miss a dose, take it as soon as you can. If it is almost time for your next dose, take only that dose. Do not take double or extra doses.  What may interact with this medicine?  -warfarin  This list may not describe all possible interactions. Give your health care provider a list of all the medicines, herbs, non-prescription drugs, or dietary supplements you use. Also tell them if you smoke, drink alcohol, or use illegal drugs. Some items may interact with your medicine.  What should I watch for while using this medicine?  Herbal or dietary supplements are not regulated like medicines. Rigid  standards are not required for dietary supplements. The purity and strength of these products can vary. The safety and effect of this dietary supplement for a certain disease or illness is not well known. This product is not intended to diagnose, treat, cure or prevent any disease.  The Food and Drug Administration suggests the following to help consumers protect themselves:  -Always read product labels and follow directions.  -Natural does not mean a product is safe for humans to take.  -Look for products that include USP after the ingredient name. This means that the  followed the standards of the [place] Pharmacopoeia.  -Supplements made or sold by a nationally known food or drug company are more likely to be made under tight controls. You can write to the company for more information about how the product was made.  What side effects may I notice from receiving this medicine?  Side effects that you should report to your doctor or health care professional as soon as possible:  -allergic reactions like skin rash, itching or hives,  swelling of the face, lips, or tongue  -blood in the urine  -pain in the lower back or side  -pain when urinating  Side effects that usually do not require medical attention (report to your doctor or health care professional if they continue or are bothersome):  -changes in taste  -mild stomach upset  This list may not describe all possible side effects. Call your doctor for medical advice about side effects. You may report side effects to FDA at 7-818-HYF-1234.  Where should I keep my medicine?  Keep out of the reach of children.  Store as directed on the package label. Throw away any unused supplement after the expiration date.  NOTE: This sheet is a summary. It may not cover all possible information. If you have questions about this medicine, talk to your doctor, pharmacist, or health care provider.  © 2018 [employer] (2017-01-19 11:26:13)  Loratadine tablets  What is this medicine?  LORATADINE (jie AT a juliet) is an antihistamine. It helps to relieve sneezing, runny nose, and itchy, watery eyes. This medicine is used to treat the symptoms of allergies. It is also used to treat itchy skin rash and hives.  This medicine may be used for other purposes; ask your health care provider or pharmacist if you have questions.  COMMON BRAND NAME(S): Alavert, Allergy Relief, Claritin, Claritin Hives Relief, Clear-Atadine, QlearQuil All Day & All Night Allergy Relief, Tavist ND  What should I tell my health care provider before I take this medicine?  They need to know if you have any of these conditions:  -asthma  -kidney disease  -liver disease  -an unusual or allergic reaction to loratadine, other antihistamines, other medicines, foods, dyes, or preservatives  -pregnant or trying to get pregnant  -breast-feeding  How should I use this medicine?  Take this medicine by mouth with a glass of water. Follow the directions on the label. You may take this medicine with food or on an empty stomach. Take your medicine at  regular intervals. Do not take your medicine more often than directed.  Talk to your pediatrician regarding the use of this medicine in children. While this medicine may be used in children as young as 6 years for selected conditions, precautions do apply.  Overdosage: If you think you have taken too much of this medicine contact a poison control center or emergency room at once.  NOTE: This medicine is only for you. Do not share this medicine with others.  What if I miss a dose?  If you miss a dose, take it as soon as you can. If it is almost time for your next dose, take only that dose. Do not take double or extra doses.  What may interact with this medicine?  -other medicines for colds or allergies  This list may not describe all possible interactions. Give your health care provider a list of all the medicines, herbs, non-prescription drugs, or dietary supplements you use. Also tell them if you smoke, drink alcohol, or use illegal drugs. Some items may interact with your medicine.  What should I watch for while using this medicine?  Tell your doctor or healthcare professional if your symptoms do not start to get better or if they get worse.  Your mouth may get dry. Chewing sugarless gum or sucking hard candy, and drinking plenty of water may help. Contact your doctor if the problem does not go away or is severe.  You may get drowsy or dizzy. Do not drive, use machinery, or do anything that needs mental alertness until you know how this medicine affects you. Do not stand or sit up quickly, especially if you are an older patient. This reduces the risk of dizzy or fainting spells.  What side effects may I notice from receiving this medicine?  Side effects that you should report to your doctor or health care professional as soon as possible:  -allergic reactions like skin rash, itching or hives, swelling of the face, lips, or tongue  -breathing problems  -unusually restless or nervous  Side effects that usually do  not require medical attention (report to your doctor or health care professional if they continue or are bothersome):  -drowsiness  -dry or irritated mouth or throat  -headache  This list may not describe all possible side effects. Call your doctor for medical advice about side effects. You may report side effects to FDA at 5-248-ASV-1434.  Where should I keep my medicine?  Keep out of the reach of children.  Store at room temperature between 2 and 30 degrees C (36 and 86 degrees F). Protect from moisture. Throw away any unused medicine after the expiration date.  NOTE: This sheet is a summary. It may not cover all possible information. If you have questions about this medicine, talk to your doctor, pharmacist, or health care provider.  © 2018 [employer] Standard (2009-06-22 17:17:24)  Allergic Rhinitis  Allergic rhinitis is when the mucous membranes in the nose respond to allergens. Allergens are particles in the air that cause your body to have an allergic reaction. This causes you to release allergic antibodies. Through a chain of events, these eventually cause you to release histamine into the blood stream. Although meant to protect the body, it is this release of histamine that causes your discomfort, such as frequent sneezing, congestion, and an itchy, runny nose.  What are the causes?  Seasonal allergic rhinitis (hay fever) is caused by pollen allergens that may come from grasses, trees, and weeds. Year-round allergic rhinitis (perennial allergic rhinitis) is caused by allergens such as house dust mites, pet dander, and mold spores.  What are the signs or symptoms?  Nasal stuffiness (congestion).  Itchy, runny nose with sneezing and tearing of the eyes.  How is this diagnosed?  Your health care provider can help you determine the allergen or allergens that trigger your symptoms. If you and your health care provider are unable to determine the allergen, skin or blood testing may be used. Your health care  provider will diagnose your condition after taking your health history and performing a physical exam. Your health care provider may assess you for other related conditions, such as asthma, pink eye, or an ear infection.  How is this treated?  Allergic rhinitis does not have a cure, but it can be controlled by:  Medicines that block allergy symptoms. These may include allergy shots, nasal sprays, and oral antihistamines.  Avoiding the allergen.  Hay fever may often be treated with antihistamines in pill or nasal spray forms. Antihistamines block the effects of histamine. There are over-the-counter medicines that may help with nasal congestion and swelling around the eyes. Check with your health care provider before taking or giving this medicine.  If avoiding the allergen or the medicine prescribed do not work, there are many new medicines your health care provider can prescribe. Stronger medicine may be used if initial measures are ineffective. Desensitizing injections can be used if medicine and avoidance does not work. Desensitization is when a patient is given ongoing shots until the body becomes less sensitive to the allergen. Make sure you follow up with your health care provider if problems continue.  Follow these instructions at home:  It is not possible to completely avoid allergens, but you can reduce your symptoms by taking steps to limit your exposure to them. It helps to know exactly what you are allergic to so that you can avoid your specific triggers.  Contact a health care provider if:  You have a fever.  You develop a cough that does not stop easily (persistent).  You have shortness of breath.  You start wheezing.  Symptoms interfere with normal daily activities.  This information is not intended to replace advice given to you by your health care provider. Make sure you discuss any questions you have with your health care provider.  Document Released: 09/12/2002 Document Revised: 08/18/2017 Document  Reviewed: 08/25/2014  [employer] Interactive Patient Education © 2017 [employer].

## 2019-04-25 NOTE — ASSESSMENT & PLAN NOTE
Patient at bedtime feels like her dizzy and her heart seems to be racing.  Happens 3-4 times a week.  She is afraid to go to sleep becuase she thinks she is going to die.  Eats dinner at 6 pm - eats noodles and vegetables.  She doesn't eat protein.  She gets the symptoms at 10 pm.  If her  gives her chocolate and she feels better.

## 2019-04-29 NOTE — ASSESSMENT & PLAN NOTE
Patient has some darkening areas on her face that are enlarging.  She would like to see a dermatologist.

## 2019-04-29 NOTE — ASSESSMENT & PLAN NOTE
Patient complains of urinary frequency and slight discomfort.  She took some Levaquin that she had from [place] and the symptoms improved.

## 2019-04-29 NOTE — PROGRESS NOTES
Subjective:     Chief Complaint   Patient presents with   • Itchy Eye     nearly every day, dry eye   • Dizziness     Dizziness at night time, wants to    • Skin Discoloration     on R Cheek Bone    • UTI     occassional discomfort and pain while urinating        Mikal Murillo is a 49 y.o. female here today for evaluation and management of:    Allergic rhinitis due to pollen  Patient complains nasal congestion, sneezing itchy, red, irritated eyes  Has tried over the counter medications- tried Zyrtec and it caused dizziness.  No asthma -  No increased symptoms with exertion.  Symptoms seasonal in spring, fall.     ROS  No fever, chills, sweats.  No productive cough  No sinus pain or pressure  No swollen glands  No rash. No eczema.  Pertinent  ROS findings as above. All other systems reviewed and are negative.       Education: Discussed that no one medicine likely to provide complete relief. Discussed allergen avoidance and environment modification when possible, showering and shampooing before bed, taking shoes off indoors so not tracking pollen in home.  Discussed that meds more effective with daily use.   Advised Zyrtec or Allegra OTC, Nasal steroid Rx, OTC allergy eye drops prn. If not effective, consider immunotherapy.      Hypoglycemia  Patient at bedtime feels like her dizzy and her heart seems to be racing.  Happens 3-4 times a week.  She is afraid to go to sleep becuase she thinks she is going to die.  Eats dinner at 6 pm - eats noodles and vegetables.  She doesn't eat protein.  She gets the symptoms at 10 pm.  If her  gives her chocolate and she feels better.    Changing pigmented skin lesion  Patient has some darkening areas on her face that are enlarging.  She would like to see a dermatologist.    Dysuria  Patient complains of urinary frequency and slight discomfort.  She took some Levaquin that she had from [place] and the symptoms improved.         Allergies   Allergen Reactions   • Pcn [Penicillins]  "       Current medicines (including changes today)  Current Outpatient Prescriptions   Medication Sig Dispense Refill   • Cholecalciferol (VITAMIN D) 2000 units Cap Take  by mouth.       No current facility-administered medications for this visit.        She  has a past medical history of Hypothyroid and Perimenopausal vasomotor symptoms (5/6/2014).    Patient Active Problem List    Diagnosis Date Noted   • Dysuria 04/25/2019   • Changing pigmented skin lesion 04/25/2019   • Other chest pain 02/20/2019   • Dizziness 02/20/2019   • Blood in stool 02/20/2019   • Elevated TSH 07/10/2018   • Lateral epicondylitis of right elbow 05/30/2018   • Anxiety disorder 02/14/2018   • Neck pain, acute 01/03/2018   • Herpes simplex infection 06/01/2017   • Inguinal lymphadenopathy 06/01/2017   • Hypoglycemia 12/05/2016   • Leukopenia 12/05/2016   • Vitamin D deficiency 08/17/2016   • Intrinsic eczema 07/29/2016   • Allergic rhinitis due to pollen 07/29/2016   • History of thyroid disease 10/14/2015   • Perimenopausal vasomotor symptoms 05/06/2014       ROS   No fever or chills.  No nausea or vomiting.  No chest pain or palpitations.  No cough or SOB.  No hematuria.  No black or bloody stools.       Objective:     /60 (BP Location: Left arm, Patient Position: Sitting, BP Cuff Size: Adult)   Pulse 66   Temp 36.5 °C (97.7 °F) (Temporal)   Resp 20   Ht 1.6 m (5' 3\")   Wt 49 kg (108 lb 0.4 oz)   SpO2 97%  Body mass index is 19.14 kg/m².   Physical Exam:  Well developed, well nourished.  Alert, oriented in no acute distress.  Eye contact is good, speech goal directed, affect calm  Eyes: conjunctiva non-injected, sclera non-icteric.  Ears: Pinna normal. TM pearly gray.   Nose: Nares are patent.  Pale, boggy mucosa with clear discharge  Mouth: Oral mucous membranes pink and moist with no lesions.  Neck Supple.  No adenopathy or masses in the neck or supraclavicular regions. No thyromegaly  Lungs: clear to auscultation " bilaterally with good excursion. No wheezes or rhonchi  CV: regular rate and rhythm. No murmur  Abdomen: soft, nontender, no masses or organomegaly.  No rebound or guarding             Assessment and Plan:   The following treatment plan was discussed    1. Seasonal allergic rhinitis due to pollen  Claritin or Allegra as needed.  Consider trying children's dose first.  Discussed avoidance measures.    2. Hypoglycemia  Dietary counseling done.  Encouraged to eat more protein and more frequently.  Patient educations materials given    3. Dysuria  Stress the importance of not taking antibiotics without first being checked out - especially fluroquinolones.  Discussed risks of these medications.  Try AZO and increased fluids first and then if symptoms persist come in for a UA.    4. Changing pigmented skin lesion  Refer to dermatology for further evaluation and treatment.  - REFERRAL TO DERMATOLOGY    Any change or worsening of signs or symptoms, patient encouraged to follow-up or report to the emergency room for further evaluation. Patient understands and agrees.    Followup: Return in about 3 months (around 7/31/2019).

## 2019-06-15 ENCOUNTER — OFFICE VISIT (OUTPATIENT)
Dept: URGENT CARE | Facility: CLINIC | Age: 49
End: 2019-06-15
Payer: COMMERCIAL

## 2019-06-15 VITALS
WEIGHT: 110 LBS | SYSTOLIC BLOOD PRESSURE: 112 MMHG | BODY MASS INDEX: 18.78 KG/M2 | HEIGHT: 64 IN | TEMPERATURE: 97.8 F | DIASTOLIC BLOOD PRESSURE: 58 MMHG | OXYGEN SATURATION: 97 % | RESPIRATION RATE: 12 BRPM | HEART RATE: 69 BPM

## 2019-06-15 DIAGNOSIS — M79.2 NEURALGIA INVOLVING SCALP: ICD-10-CM

## 2019-06-15 PROCEDURE — 99213 OFFICE O/P EST LOW 20 MIN: CPT | Performed by: NURSE PRACTITIONER

## 2019-06-15 RX ORDER — IBUPROFEN 200 MG
200 TABLET ORAL EVERY 6 HOURS PRN
COMMUNITY
End: 2019-08-22

## 2019-06-15 NOTE — PROGRESS NOTES
Chief Complaint   Patient presents with   • Headache         HISTORY OF PRESENT ILLNESS: Patient is a 49 y.o. female who presents to urgent care today with complaints of pain to her scalp for the past two days. She notes pain to right sided, occipital region of scalp. The pain is described as pulsating, sharp, occurring every several seconds. She denies complaints of headache, visual changes, unilateral weakness, changes in her speech, difficulty with ambulation, fever, chills, neck pain, or rash.  She is able to pinpoint the area of pain with her finger on the scalp.  She has tried ibuprofen for symptom relief.  She also notes that when she went to sleep last night, her pain improved.  She does report recent increase in stress, noting that just prior to onset she had lacked a significant amount of sleep and had a stressful conversation with a friend.    Patient Active Problem List    Diagnosis Date Noted   • Dysuria 04/25/2019   • Changing pigmented skin lesion 04/25/2019   • Other chest pain 02/20/2019   • Dizziness 02/20/2019   • Blood in stool 02/20/2019   • Elevated TSH 07/10/2018   • Lateral epicondylitis of right elbow 05/30/2018   • Anxiety disorder 02/14/2018   • Neck pain, acute 01/03/2018   • Herpes simplex infection 06/01/2017   • Inguinal lymphadenopathy 06/01/2017   • Hypoglycemia 12/05/2016   • Leukopenia 12/05/2016   • Vitamin D deficiency 08/17/2016   • Intrinsic eczema 07/29/2016   • Allergic rhinitis due to pollen 07/29/2016   • History of thyroid disease 10/14/2015   • Perimenopausal vasomotor symptoms 05/06/2014       Allergies:Pcn [penicillins]    Current Outpatient Prescriptions Ordered in [facility]   Medication Sig Dispense Refill   • ibuprofen (MOTRIN) 200 MG Tab Take 200 mg by mouth every 6 hours as needed.     • Cholecalciferol (VITAMIN D) 2000 units Cap Take  by mouth.       No current Epic-ordered facility-administered medications on file.        Past Medical History:   Diagnosis Date   •  "Hypothyroid    • Perimenopausal vasomotor symptoms 2014       Social History   Substance Use Topics   • Smoking status: Never Smoker   • Smokeless tobacco: Never Used   • Alcohol use No       Family Status   Relation Status   • Mo    • Fa Alive   • Sis (Not Specified)   • Bro (Not Specified)   • Bro (Not Specified)   • Neg Hx (Not Specified)     Family History   Problem Relation Age of Onset   • Dementia Mother    • Hypertension Mother    • Diabetes Mother    • Heart Disease Father    • Diabetes Father    • Thyroid Sister    • Cancer Brother         half brother, prostate   • Hyperlipidemia Brother         half brother   • Stroke Neg Hx    • Psychiatry Neg Hx        ROS:  Review of Systems   Constitutional: Negative for fever, chills, weight loss, malaise, and fatigue.   HENT: Negative for ear pain, nosebleeds, congestion, sore throat and neck pain.    Eyes: Negative for vision changes.   Neuro: Positive for pain to right scalp region.  Negative for headache, sensory changes, weakness, seizure, LOC.   Cardiovascular: Negative for chest pain, palpitations, orthopnea and leg swelling.   Respiratory: Negative for cough, sputum production, shortness of breath and wheezing.   Gastrointestinal: Negative for abdominal pain, nausea, vomiting or diarrhea.   Genitourinary: Negative for dysuria, urgency and frequency.  Musculoskeletal: Negative for falls, neck pain, back pain, joint pain, myalgias.   Skin: Negative for rash, diaphoresis.     Exam:  /58   Pulse 69   Temp 36.6 °C (97.8 °F) (Temporal)   Resp 12   Ht 1.626 m (5' 4\")   Wt 49.9 kg (110 lb)   SpO2 97%   General: well-nourished, well-developed female in NAD  Head: normocephalic, atraumatic.  The patient is able to pinpoint perceived area of pain, touch affects her sensation and pain.  There is no rash noted.  No swelling, erythema either.  Eyes: PERRLA, no conjunctival injection, acuity grossly intact, lids normal.  Ears: normal shape and " symmetry, no tenderness, no discharge. External canals are without any significant edema or erythema. Tympanic membranes are without any inflammation, no effusion. Gross auditory acuity is intact.  Nose: symmetrical without tenderness, no discharge.  Mouth/Throat: reasonable hygiene, no erythema, exudates or tonsillar enlargement.  Neck: no masses, range of motion within normal limits, no tracheal deviation. No obvious thyroid enlargement.   Lymph: no cervical adenopathy. No supraclavicular adenopathy.   Neuro: alert and oriented. Cranial nerves 1-12 grossly intact. No sensory deficit.  No drift, no droop.  Cardiovascular: regular rate and rhythm. No edema.  Pulmonary: no distress. Chest is symmetrical with respiration, no wheezes, crackles, or rhonchi.   Musculoskeletal: no clubbing, appropriate muscle tone, gait is stable.  Skin: warm, dry, intact, no clubbing, no cyanosis, no rashes.   Psych: appropriate mood, affect, judgement.         Assessment/Plan:  1. Neuralgia involving scalp           The patient symptoms and presentation appear consistent with neuralgia.  There is no rash but I have discussed potential of early onset of shingles.  Return with any rash noted.  Reduced stress, increased sleep.  If pain is persistent, follow-up with PCP as may benefit from long-term medication management.  In the meantime, STRICT ER precautions advised.  Supportive care, differential diagnoses, and indications for immediate follow-up discussed with patient.   Pathogenesis of diagnosis discussed including typical length and natural progression.   Instructed to return to clinic or nearest emergency department for any change in condition, further concerns, or worsening of symptoms.  Patient states understanding of the plan of care and discharge instructions.  Instructed to make an appointment, for follow up, with her primary care provider.        Please note that this dictation was created using voice recognition software. I  have made every reasonable attempt to correct obvious errors, but I expect that there are errors of grammar and possibly content that I did not discover before finalizing the note.      LOYDA George.

## 2019-06-22 ENCOUNTER — OFFICE VISIT (OUTPATIENT)
Dept: URGENT CARE | Facility: CLINIC | Age: 49
End: 2019-06-22
Payer: COMMERCIAL

## 2019-06-22 VITALS
WEIGHT: 110 LBS | HEART RATE: 72 BPM | TEMPERATURE: 99 F | HEIGHT: 64 IN | SYSTOLIC BLOOD PRESSURE: 112 MMHG | OXYGEN SATURATION: 97 % | DIASTOLIC BLOOD PRESSURE: 72 MMHG | RESPIRATION RATE: 16 BRPM | BODY MASS INDEX: 18.78 KG/M2

## 2019-06-22 DIAGNOSIS — J06.9 VIRAL URI WITH COUGH: ICD-10-CM

## 2019-06-22 PROCEDURE — 99213 OFFICE O/P EST LOW 20 MIN: CPT | Performed by: FAMILY MEDICINE

## 2019-06-22 RX ORDER — BENZONATATE 100 MG/1
100 CAPSULE ORAL 3 TIMES DAILY PRN
Qty: 60 CAP | Refills: 0 | Status: SHIPPED | OUTPATIENT
Start: 2019-06-22 | End: 2019-08-22

## 2019-06-22 ASSESSMENT — ENCOUNTER SYMPTOMS
SORE THROAT: 1
HEADACHES: 1
COUGH: 1
FEVER: 0
VOMITING: 0

## 2019-06-22 NOTE — PROGRESS NOTES
"Subjective:     Mikal Murillo is a 49 y.o. female who presents for Cough (cough , chest congestion , headaches , SOB x 3 days )    HPI  Pt presents for evaluation of a new problem   Pt with cough, chest congestion, headache   Symptoms are overall worsening   Cough is dry and non-productive   Having pressure in frontal area     Review of Systems   Constitutional: Negative for fever.   HENT: Positive for congestion and sore throat.    Respiratory: Positive for cough.    Cardiovascular: Negative for chest pain.   Gastrointestinal: Negative for vomiting.   Skin: Negative for rash.   Neurological: Positive for headaches.       PMH:  has a past medical history of Hypothyroid and Perimenopausal vasomotor symptoms (5/6/2014).  MEDS:   Current Outpatient Prescriptions:   •  ibuprofen (MOTRIN) 200 MG Tab, Take 200 mg by mouth every 6 hours as needed., Disp: , Rfl:   •  Cholecalciferol (VITAMIN D) 2000 units Cap, Take  by mouth., Disp: , Rfl:   ALLERGIES:   Allergies   Allergen Reactions   • Pcn [Penicillins] Hives     SURGHX: No past surgical history on file.  SOCHX:  reports that she has never smoked. She has never used smokeless tobacco. She reports that she does not drink alcohol or use drugs.  FH: Family history was reviewed, not contributing to acute illness     Objective:   /72 (BP Location: Left arm, Patient Position: Sitting, BP Cuff Size: Adult)   Pulse 72   Temp 37.2 °C (99 °F) (Temporal)   Resp 16   Ht 1.626 m (5' 4\")   Wt 49.9 kg (110 lb)   LMP 04/23/2017   SpO2 97%   BMI 18.88 kg/m²     Physical Exam   Constitutional: She appears well-developed and well-nourished. No distress.   HENT:   Head: Normocephalic and atraumatic.   Right Ear: Tympanic membrane, external ear and ear canal normal.   Left Ear: Tympanic membrane, external ear and ear canal normal.   Nose: Mucosal edema and rhinorrhea present.   Mouth/Throat: Uvula is midline, oropharynx is clear and moist and mucous membranes are normal.   Eyes: " Conjunctivae and EOM are normal. Right eye exhibits no discharge. Left eye exhibits no discharge. No scleral icterus.   Neck: Normal range of motion. No tracheal deviation present.   Cardiovascular: Normal rate and regular rhythm.    Pulmonary/Chest: Effort normal and breath sounds normal. No respiratory distress. She has no wheezes. She has no rales.   Neurological: She is alert. Coordination normal.   Skin: Skin is warm and dry. No rash noted. She is not diaphoretic.   Psychiatric: She has a normal mood and affect. Her behavior is normal. Judgment and thought content normal.       Assessment/Plan:   Assessment    1. Viral URI with cough  - benzonatate (TESSALON) 100 MG Cap; Take 1 Cap by mouth 3 times a day as needed for Cough.  Dispense: 60 Cap; Refill: 0

## 2019-07-11 ENCOUNTER — APPOINTMENT (RX ONLY)
Dept: URBAN - METROPOLITAN AREA CLINIC 4 | Facility: CLINIC | Age: 49
Setting detail: DERMATOLOGY
End: 2019-07-11

## 2019-07-11 DIAGNOSIS — L81.4 OTHER MELANIN HYPERPIGMENTATION: ICD-10-CM

## 2019-07-11 DIAGNOSIS — Z71.89 OTHER SPECIFIED COUNSELING: ICD-10-CM

## 2019-07-11 DIAGNOSIS — L82.1 OTHER SEBORRHEIC KERATOSIS: ICD-10-CM

## 2019-07-11 DIAGNOSIS — D22 MELANOCYTIC NEVI: ICD-10-CM

## 2019-07-11 DIAGNOSIS — L28.0 LICHEN SIMPLEX CHRONICUS: ICD-10-CM

## 2019-07-11 PROBLEM — D48.5 NEOPLASM OF UNCERTAIN BEHAVIOR OF SKIN: Status: ACTIVE | Noted: 2019-07-11

## 2019-07-11 PROCEDURE — ? COUNSELING

## 2019-07-11 PROCEDURE — ? ADDITIONAL NOTES

## 2019-07-11 PROCEDURE — ? BIOPSY BY SHAVE METHOD

## 2019-07-11 PROCEDURE — ? SUNSCREEN RECOMMENDATIONS

## 2019-07-11 PROCEDURE — 99202 OFFICE O/P NEW SF 15 MIN: CPT | Mod: 25

## 2019-07-11 PROCEDURE — 11102 TANGNTL BX SKIN SINGLE LES: CPT

## 2019-07-11 PROCEDURE — ? PRESCRIPTION

## 2019-07-11 RX ORDER — TRIAMCINOLONE ACETONIDE 0.1 %
OINTMENT (GRAM) TOPICAL
Qty: 1 | Refills: 1 | Status: ERX | COMMUNITY
Start: 2019-07-11

## 2019-07-11 RX ADMIN — Medication: at 00:00

## 2019-07-11 ASSESSMENT — LOCATION DETAILED DESCRIPTION DERM
LOCATION DETAILED: RIGHT POSTERIOR NECK
LOCATION DETAILED: RIGHT INFERIOR CENTRAL MALAR CHEEK
LOCATION DETAILED: RIGHT SUPERIOR CENTRAL MALAR CHEEK
LOCATION DETAILED: RIGHT CENTRAL MALAR CHEEK
LOCATION DETAILED: LEFT INFERIOR LATERAL NECK

## 2019-07-11 ASSESSMENT — LOCATION SIMPLE DESCRIPTION DERM
LOCATION SIMPLE: LEFT ANTERIOR NECK
LOCATION SIMPLE: POSTERIOR NECK
LOCATION SIMPLE: RIGHT CHEEK

## 2019-07-11 ASSESSMENT — LOCATION ZONE DERM
LOCATION ZONE: FACE
LOCATION ZONE: NECK

## 2019-07-11 NOTE — PROCEDURE: BIOPSY BY SHAVE METHOD
X Size Of Lesion In Cm: 0
Bill For Surgical Tray: no
Biopsy Type: H and E
Consent: Written consent was obtained and risks were reviewed including but not limited to scarring, infection, bleeding, scabbing, incomplete removal, nerve damage and allergy to anesthesia.
Electrodesiccation Text: The wound bed was treated with electrodesiccation after the biopsy was performed.
Electrodesiccation And Curettage Text: The wound bed was treated with electrodesiccation and curettage after the biopsy was performed.
Type Of Destruction Used: Electrodesiccation
Anesthesia Volume In Cc: 0.5
Was A Bandage Applied: Yes
Dressing: bandage
Hemostasis: Drysol
Silver Nitrate Text: The wound bed was treated with silver nitrate after the biopsy was performed.
Curettage Text: The wound bed was treated with curettage after the biopsy was performed.
Billing Type: Third-Party Bill
Biopsy Method: Personna blade
Detail Level: Detailed
Post-Care Instructions: I reviewed with the patient in detail post-care instructions. Patient is to keep the biopsy site dry overnight, and then apply bacitracin twice daily until healed. Patient may apply hydrogen peroxide soaks to remove any crusting.
Lab: [id number]
Wound Care: Vaseline
Lab Facility: 
Size Of Lesion In Cm: 0.6
Anesthesia Type: 1% lidocaine with epinephrine
Cryotherapy Text: The wound bed was treated with cryotherapy after the biopsy was performed.
Depth Of Biopsy: dermis
Notification Instructions: Patient will be notified of biopsy results. However, patient instructed to call the office if not contacted within 2 weeks.

## 2019-07-11 NOTE — PROCEDURE: ADDITIONAL NOTES
Additional Notes: Do not itch this area. Apply cold pack to area when it itches.
Detail Level: Simple

## 2019-08-06 ENCOUNTER — OFFICE VISIT (OUTPATIENT)
Dept: MEDICAL GROUP | Facility: LAB | Age: 49
End: 2019-08-06
Payer: COMMERCIAL

## 2019-08-06 VITALS
TEMPERATURE: 97.3 F | OXYGEN SATURATION: 96 % | WEIGHT: 109.6 LBS | SYSTOLIC BLOOD PRESSURE: 110 MMHG | BODY MASS INDEX: 18.71 KG/M2 | DIASTOLIC BLOOD PRESSURE: 68 MMHG | HEIGHT: 64 IN | HEART RATE: 63 BPM

## 2019-08-06 DIAGNOSIS — Z12.31 ENCOUNTER FOR SCREENING MAMMOGRAM FOR BREAST CANCER: ICD-10-CM

## 2019-08-06 DIAGNOSIS — Z23 NEED FOR VACCINATION: ICD-10-CM

## 2019-08-06 DIAGNOSIS — K21.9 GASTROESOPHAGEAL REFLUX DISEASE WITHOUT ESOPHAGITIS: ICD-10-CM

## 2019-08-06 DIAGNOSIS — R10.13 EPIGASTRIC PAIN: ICD-10-CM

## 2019-08-06 PROCEDURE — 90715 TDAP VACCINE 7 YRS/> IM: CPT | Performed by: FAMILY MEDICINE

## 2019-08-06 PROCEDURE — 90471 IMMUNIZATION ADMIN: CPT | Performed by: FAMILY MEDICINE

## 2019-08-06 PROCEDURE — 99214 OFFICE O/P EST MOD 30 MIN: CPT | Mod: 25 | Performed by: FAMILY MEDICINE

## 2019-08-06 RX ORDER — OMEPRAZOLE 20 MG/1
20 CAPSULE, DELAYED RELEASE ORAL DAILY
COMMUNITY
End: 2019-08-22

## 2019-08-06 NOTE — ASSESSMENT & PLAN NOTE
She was started on Omeprazole a month ago and that has helped some.  She complains of epigastric pain and the area felt hard.  Worse when she eats spicy foods or she eats later at night.  She denies any nausea or vomiting.  No black or bloody stools.

## 2019-08-06 NOTE — PATIENT INSTRUCTIONS
Helicobacter Pylori Infection  Introduction  Helicobacter pylori infection is an infection in the stomach that is caused by the Helicobacter pylori (H. pylori) bacteria. This type of bacteria often lives in the lining of the stomach. The infection can cause ulcers and irritation (gastritis) in some people. It is the most common cause of ulcers in the stomach (gastric ulcer) and in the upper part of the intestine (duodenal ulcer). Having this infection may also increase the risk of stomach cancer and a type of white blood cell cancer (lymphoma) that affects the stomach.  What are the causes?  H. pylori is a type of bacteria that is often found in the stomachs of healthy people. The bacteria may be passed from person to person through contact with stool or saliva. It is not known why some people develop ulcers, gastritis, or cancer from the infection.  What increases the risk?  This condition is more likely to develop in people who:  · Have family members with the infection.  · Live with many other people, such as in a dormitory.  · Are of , , or  descent.  What are the signs or symptoms?  Most people with this infection do not have symptoms. If you do have symptoms, they may include:  · Heartburn.  · Stomach pain.  · Nausea.  · Vomiting.  · Blood-tinged vomit.  · Loss of appetite.  · Bad breath.  How is this diagnosed?  This condition may be diagnosed based on your symptoms, a physical exam, and various tests. Tests may include:  · Blood tests or stool tests to check for the proteins (antibodies) that your body may produce in response to the bacteria. These tests are the best way to confirm the diagnosis.  · A breath test to check for the type of gas that the H. pylori bacteria release after breaking down a substance called urea. For the test, you are asked to drink urea. This test is often done after treatment in order to find out if the treatment worked.  · A procedure in which a thin, flexible  tube with a tiny camera at the end is placed into your stomach and upper intestine (upper endoscopy). Your health care provider may also take tissue samples (biopsy) to test for H. pylori and cancer.  How is this treated?  Treatment for this condition usually involves taking a combination of medicines (triple therapy) for a couple of weeks. Triple therapy includes one medicine to reduce the acid in your stomach and two types of antibiotic medicines. Many drug combinations have been approved for treatment. Treatment usually kills the H. pylori and reduces your risk of cancer. You may need to be tested for H. pylori again after treatment. In some cases, the treatment may need to be repeated.  Follow these instructions at home:  · Take over-the-counter and prescription medicines only as told by your health care provider.  · Take your antibiotics as told by your health care provider. Do not stop taking the antibiotics even if you start to feel better.  · You can do all your usual activities and eat what you usually do.  · Take steps to prevent future infections:  ¨ Wash your hands often.  ¨ Make sure the food you eat has been properly prepared.  ¨ Drink water only from clean sources.  · Keep all follow-up visits as told by your health care provider. This is important.  Contact a health care provider if:  · Your symptoms do not get better.  · Your symptoms return after treatment.  This information is not intended to replace advice given to you by your health care provider. Make sure you discuss any questions you have with your health care provider.  Document Released: 04/10/2017 Document Revised: 05/25/2017 Document Reviewed: 12/30/2015  © 2017 Elsevier  Menopause and Herbal Products  Introduction  WHAT IS MENOPAUSE?  Menopause is the normal time of life when menstrual periods decrease in frequency and eventually stop completely. This process can take several years for some women. Menopause is complete when you have had  an absence of menstruation for a full year since your last menstrual period. It usually occurs between the ages of 48 and 55. It is not common for menopause to begin before the age of 40.  During menopause, your body stops producing the female hormones estrogen and progesterone. Common symptoms associated with this loss of hormones (vasomotor symptoms) are:  · Hot flashes.  · Hot flushes.  · Night sweats.  Other common symptoms and complications of menopause include:  · Decrease in sex drive.  · Vaginal dryness and thinning of the walls of the vagina. This can make sex painful.  · Dryness of the skin and development of wrinkles.  · Headaches.  · Tiredness.  · Irritability.  · Memory problems.  · Weight gain.  · Bladder infections.  · Hair growth on the face and chest.  · Inability to reproduce offspring (infertility).  · Loss of density in the bones (osteoporosis) increasing your risk for breaks (fractures).  · Depression.  · Hardening and narrowing of the arteries (atherosclerosis). This increases your risk of heart attack and stroke.  WHAT TREATMENT OPTIONS ARE AVAILABLE?  There are many treatment choices for menopause symptoms. The most common treatment is hormone replacement therapy. Many alternative therapies for menopause are emerging, including the use of herbal products. These supplements can be found in the form of herbs, teas, oils, tinctures, and pills.  Common herbal supplements for menopause are made from plants that contain phytoestrogens. Phytoestrogens are compounds that occur naturally in plants and plant products. They act like estrogen in the body. Foods and herbs that contain phytoestrogens include:  · Soy.  · Flax seeds.  · Red clover.  · Ginseng.  WHAT MENOPAUSE SYMPTOMS MAY BE HELPED IF I USE HERBAL PRODUCTS?  · Vasomotor symptoms. These may be helped by:  ¨ Soy. Some studies show that soy may have a moderate benefit for hot flashes.  ¨ Black cohosh. There is limited evidence indicating this  may be beneficial for hot flashes.  · Symptoms that are related to heart and blood vessel disease. These may be helped by soy. Studies have shown that soy can help to lower cholesterol.  · Depression. This may be helped by:  ¨ Lockridge’s wort. There is limited evidence that shows this may help mild to moderate depression.  ¨ Black cohosh. There is evidence that this may help depression and mood swings.  · Osteoporosis. Soy may help to decrease bone loss that is associated with menopause and may prevent osteoporosis. Limited evidence indicates that red clover may offer some bone loss protection as well.  Other herbal products that are commonly used during menopause lack enough evidence to support their use as a replacement for conventional menopause therapies. These products include evening primrose, ginseng, and red clover.  WHAT ARE THE CASES WHEN HERBAL PRODUCTS SHOULD NOT BE USED DURING MENOPAUSE?  Do not use herbal products during menopause without your health care provider’s approval if:  · You are taking medicine.  · You have a preexisting liver condition.  ARE THERE ANY RISKS IN MY TAKING HERBAL PRODUCTS DURING MENOPAUSE?  If you choose to use herbal products to help with symptoms of menopause, keep in mind that:  · Different supplements have different and unmeasured amounts of herbal ingredients.  · Herbal products are not regulated the same way that medicines are.  · Concentrations of herbs may vary depending on the way they are prepared. For example, the concentration may be different in a pill, tea, oil, and tincture.  · Little is known about the risks of using herbal products, particularly the risks of long-term use.  · Some herbal supplements can be harmful when combined with certain medicines.  Most commonly reported side effects of herbal products are mild. However, if used improperly, many herbal supplements can cause serious problems. Talk to your health care provider before starting any herbal  product. If problems develop, stop taking the supplement and let your health care provider know.  This information is not intended to replace advice given to you by your health care provider. Make sure you discuss any questions you have with your health care provider.  Document Released: 06/05/2009 Document Revised: 05/25/2017 Document Reviewed: 06/02/2015  © 2017 [employer]  Abdominal or Pelvic Ultrasound  An ultrasound is a test that takes pictures of the inside of the body. An abdominal ultrasound takes pictures of your belly. A pelvic ultrasound takes pictures of the area between your belly and thighs.  An ultrasound may be done to check an organ or look for problems. If you are pregnant, it may be done to learn about your baby.  What happens before the procedure?  · Follow instructions from your doctor about what you cannot eat or drink.  · Wear clothing that is easy to wash. Gel from the test might get on your clothes.  What happens during the procedure?  · A gel will be put on your skin. It may feel cool.  · A wand called a transducer will be put on your skin.  · The wand will take pictures. They will show on small TV screens.  What happens after the procedure?  · Get your test results. Ask your doctor or the department that did the test when your results will be ready.  · Keep follow-up visits as told by your doctor. This is important.  This information is not intended to replace advice given to you by your health care provider. Make sure you discuss any questions you have with your health care provider.  Document Released: 01/20/2012 Document Revised: 08/17/2017 Document Reviewed: 09/09/2016  [employer] Interactive Patient Education © 2017 [employer]  Food Choices for Gastroesophageal Reflux Disease, Adult  When you have gastroesophageal reflux disease (GERD), the foods you eat and your eating habits are very important. Choosing the right foods can help ease the discomfort of GERD.  WHAT GENERAL GUIDELINES  DO I NEED TO FOLLOW?  · Choose fruits, vegetables, whole grains, low-fat dairy products, and low-fat meat, fish, and poultry.  · Limit fats such as oils, salad dressings, butter, nuts, and avocado.  · Keep a food diary to identify foods that cause symptoms.  · Avoid foods that cause reflux. These may be different for different people.  · Eat frequent small meals instead of three large meals each day.  · Eat your meals slowly, in a relaxed setting.  · Limit fried foods.  · Cook foods using methods other than frying.  · Avoid drinking alcohol.  · Avoid drinking large amounts of liquids with your meals.  · Avoid bending over or lying down until 2-3 hours after eating.  WHAT FOODS ARE NOT RECOMMENDED?  The following are some foods and drinks that may worsen your symptoms:  Vegetables  Tomatoes. Tomato juice. Tomato and spaghetti sauce. Chili peppers. Onion and garlic. Horseradish.  Fruits  Oranges, grapefruit, and lemon (fruit and juice).  Meats  High-fat meats, fish, and poultry. This includes hot dogs, ribs, ham, sausage, salami, and hawley.  Dairy  Whole milk and chocolate milk. Sour cream. Cream. Butter. Ice cream. Cream cheese.   Beverages  Coffee and tea, with or without caffeine. Carbonated beverages or energy drinks.  Condiments  Hot sauce. Barbecue sauce.   Sweets/Desserts  Chocolate and cocoa. Donuts. Peppermint and spearmint.  Fats and Oils  High-fat foods, including French fries and potato chips.  Other  Vinegar. Strong spices, such as black pepper, white pepper, red pepper, cayenne, guerrero powder, cloves, ginger, and chili powder.  The items listed above may not be a complete list of foods and beverages to avoid. Contact your dietitian for more information.     This information is not intended to replace advice given to you by your health care provider. Make sure you discuss any questions you have with your health care provider.     Document Released: 12/18/2006 Document Revised: 01/08/2016 Document Reviewed:  10/22/2014  Elsevier Interactive Patient Education ©2016 [employer].

## 2019-08-07 ENCOUNTER — APPOINTMENT (OUTPATIENT)
Dept: LAB | Facility: MEDICAL CENTER | Age: 49
End: 2019-08-07
Attending: FAMILY MEDICINE
Payer: COMMERCIAL

## 2019-08-08 NOTE — PROGRESS NOTES
Subjective:     Chief Complaint   Patient presents with   • Abdominal Pain     x4 days        Mikal Murillo is a 49 y.o. female here today for evaluation and management of:    Gastroesophageal reflux disease without esophagitis  She was started on Omeprazole a month ago and that has helped some.  She complains of epigastric pain and the area felt hard.  Worse when she eats spicy foods or she eats later at night.  She denies any nausea or vomiting.  No black or bloody stools.       Allergies   Allergen Reactions   • Pcn [Penicillins] Hives       Current medicines (including changes today)  Current Outpatient Medications   Medication Sig Dispense Refill   • omeprazole (PRILOSEC) 20 MG delayed-release capsule Take 20 mg by mouth every day.     • benzonatate (TESSALON) 100 MG Cap Take 1 Cap by mouth 3 times a day as needed for Cough. (Patient not taking: Reported on 8/6/2019) 60 Cap 0   • ibuprofen (MOTRIN) 200 MG Tab Take 200 mg by mouth every 6 hours as needed.     • Cholecalciferol (VITAMIN D) 2000 units Cap Take  by mouth.       No current facility-administered medications for this visit.        She  has a past medical history of Hypothyroid and Perimenopausal vasomotor symptoms (5/6/2014).    Patient Active Problem List    Diagnosis Date Noted   • Gastroesophageal reflux disease without esophagitis 08/06/2019   • Dysuria 04/25/2019   • Changing pigmented skin lesion 04/25/2019   • Other chest pain 02/20/2019   • Dizziness 02/20/2019   • Blood in stool 02/20/2019   • Elevated TSH 07/10/2018   • Lateral epicondylitis of right elbow 05/30/2018   • Anxiety disorder 02/14/2018   • Neck pain, acute 01/03/2018   • Herpes simplex infection 06/01/2017   • Inguinal lymphadenopathy 06/01/2017   • Hypoglycemia 12/05/2016   • Leukopenia 12/05/2016   • Vitamin D deficiency 08/17/2016   • Intrinsic eczema 07/29/2016   • Allergic rhinitis due to pollen 07/29/2016   • History of thyroid disease 10/14/2015   • Perimenopausal  "vasomotor symptoms 05/06/2014       ROS   No fever or chills.  No nausea or vomiting.  No chest pain or palpitations.  No cough or SOB.  No pain with urination or hematuria.  No black or bloody stools.       Objective:     /68 (BP Location: Left arm, Patient Position: Sitting)   Pulse 63   Temp 36.3 °C (97.3 °F) (Temporal)   Ht 1.626 m (5' 4\")   Wt 49.7 kg (109 lb 9.6 oz)   SpO2 96%  Body mass index is 18.81 kg/m².   Physical Exam:  Well developed, well nourished.  Alert, oriented in no acute distress.  Eye contact is good, speech goal directed, affect calm  Eyes: conjunctiva non-injected, sclera non-icteric.  Neck Supple.  No adenopathy or masses in the neck or supraclavicular regions. No thyromegaly  Lungs: clear to auscultation bilaterally with good excursion. No wheezes or rhonchi  CV: regular rate and rhythm. No murmur  Abdomen: soft, mild epigastric tenderness, no masses or organomegaly.  No rebound or guarding      Assessment and Plan:   The following treatment plan was discussed    1. Gastroesophageal reflux disease without esophagitis  Check H. pylori.  Continue ultrasound.  Continue omeprazole.  Vision education materials given on dietary changes  - H. PYLORI BREATH TEST  - US-ABDOMEN COMPLETE SURVEY; Future    2. Need for vaccination  Updated  - Tdap Vaccine =>6YO IM    3. Epigastric pain  See #1  - H. PYLORI BREATH TEST  - US-ABDOMEN COMPLETE SURVEY; Future    4. Encounter for screening mammogram for breast cancer  Schedule mammo  - MA-SCREEN MAMMO W/CAD-BILAT; Future    Any change or worsening of signs or symptoms, patient encouraged to follow-up or report to the emergency room for further evaluation. Patient understands and agrees.    Followup: Return in about 16 days (around 8/22/2019), or if symptoms worsen or fail to improve.           "

## 2019-08-16 ENCOUNTER — HOSPITAL ENCOUNTER (OUTPATIENT)
Dept: RADIOLOGY | Facility: MEDICAL CENTER | Age: 49
End: 2019-08-16
Attending: FAMILY MEDICINE
Payer: COMMERCIAL

## 2019-08-16 DIAGNOSIS — R10.13 EPIGASTRIC PAIN: ICD-10-CM

## 2019-08-16 DIAGNOSIS — K21.9 GASTROESOPHAGEAL REFLUX DISEASE WITHOUT ESOPHAGITIS: ICD-10-CM

## 2019-08-16 PROCEDURE — 76700 US EXAM ABDOM COMPLETE: CPT

## 2019-08-22 ENCOUNTER — HOSPITAL ENCOUNTER (OUTPATIENT)
Facility: MEDICAL CENTER | Age: 49
End: 2019-08-22
Attending: FAMILY MEDICINE
Payer: COMMERCIAL

## 2019-08-22 ENCOUNTER — OFFICE VISIT (OUTPATIENT)
Dept: MEDICAL GROUP | Facility: LAB | Age: 49
End: 2019-08-22
Payer: COMMERCIAL

## 2019-08-22 VITALS
HEART RATE: 62 BPM | OXYGEN SATURATION: 98 % | SYSTOLIC BLOOD PRESSURE: 112 MMHG | BODY MASS INDEX: 18.57 KG/M2 | HEIGHT: 64 IN | DIASTOLIC BLOOD PRESSURE: 58 MMHG | WEIGHT: 108.8 LBS | TEMPERATURE: 97.2 F

## 2019-08-22 DIAGNOSIS — Z01.419 WELL WOMAN EXAM WITH ROUTINE GYNECOLOGICAL EXAM: ICD-10-CM

## 2019-08-22 DIAGNOSIS — K80.20 CALCULUS OF GALLBLADDER WITHOUT CHOLECYSTITIS WITHOUT OBSTRUCTION: ICD-10-CM

## 2019-08-22 DIAGNOSIS — Z12.4 SCREENING FOR CERVICAL CANCER: ICD-10-CM

## 2019-08-22 PROCEDURE — 88175 CYTOPATH C/V AUTO FLUID REDO: CPT

## 2019-08-22 PROCEDURE — 87624 HPV HI-RISK TYP POOLED RSLT: CPT

## 2019-08-22 PROCEDURE — 99396 PREV VISIT EST AGE 40-64: CPT | Performed by: FAMILY MEDICINE

## 2019-08-22 RX ORDER — FLUCONAZOLE 150 MG/1
150 TABLET ORAL DAILY
Qty: 1 TAB | Refills: 1 | Status: SHIPPED
Start: 2019-08-22 | End: 2020-03-09

## 2019-08-22 NOTE — PATIENT INSTRUCTIONS
Pap Test  A Pap test checks the cells on the surface of your cervix. Your doctor will look for cell changes that are not normal, an infection, or cancer. If the cells no longer look normal, it is called dysplasia. Dysplasia can turn into cancer. Regular Pap tests are important to stop cancer from developing.  BEFORE THE PROCEDURE  · Ask your doctor when to schedule your Pap test. Timing the test around your period may be important.  · Do not douche or have sex (intercourse) for 24 hours before the test.  · Do not put creams on your vagina or use tampons for 24 hours before the test.  · Go pee (urinate) just before the test.  PROCEDURE  · You will lie on an exam table with your feet in stirrups.  · A warm metal or plastic tool (speculum) will be put in your vagina to open it up.  · Your doctor will use a small, plastic brush or wooden spatula to take cells from your cervix.  · The cells will be put in a lab container.  · The cells will be checked under a microscope to see if they are normal or not.  AFTER THE PROCEDURE  Get your test results. If they are abnormal, you may need more tests.  Document Released: 01/20/2012 Document Revised: 03/11/2013 Document Reviewed: 12/13/2012  [employer]® Patient Information ©2014 [employer].

## 2019-08-23 DIAGNOSIS — Z12.4 SCREENING FOR CERVICAL CANCER: ICD-10-CM

## 2019-08-25 NOTE — PROGRESS NOTES
SUBJECTIVE:   Chief Complaint   Patient presents with   • Gynecologic Exam       49 y.o. female for annual routine gynecologic exam    OB History    Para Term  AB Living   2 1 0 0 1 1   SAB TAB Ectopic Molar Multiple Live Births   0 0 0 0 0 0      Social History     Substance and Sexual Activity   Sexual Activity Yes   • Partners: Male       Last Pap: 3 years ago  HPV testing negative  H/O Abnormal Pap no  No significant bloating/fluid retention, pelvic pain, or dyspareunia. No vaginal discharge   She does perform regular self breast exams.  No breast tenderness, mass, nipple discharge, changes in size or contour, or abnormal cyclic discomfort.        ROS:    No urinary tract symptoms, no incontinence.   No abdominal pain, change in bowel habits, black or bloody stools.    No unusual headaches, no visual changes, menstrual migraines   No prolonged cough. No dyspnea or chest pain on exertion.  No depression, labile mood, anxiety ,libido changes, insomnia.  No new/concerning skin lesions, concerns        Social History     Tobacco Use   • Smoking status: Never Smoker   • Smokeless tobacco: Never Used   Substance Use Topics   • Alcohol use: No     Alcohol/week: 0.0 oz   • Drug use: No       Patient Active Problem List    Diagnosis Date Noted   • Calculus of gallbladder without cholecystitis without obstruction 2019   • Gastroesophageal reflux disease without esophagitis 2019   • Dysuria 2019   • Changing pigmented skin lesion 2019   • Other chest pain 2019   • Dizziness 2019   • Blood in stool 2019   • Elevated TSH 07/10/2018   • Lateral epicondylitis of right elbow 2018   • Anxiety disorder 2018   • Neck pain, acute 2018   • Herpes simplex infection 2017   • Inguinal lymphadenopathy 2017   • Hypoglycemia 2016   • Leukopenia 2016   • Vitamin D deficiency 2016   • Intrinsic eczema 2016   • Allergic rhinitis due  "to pollen 2016   • History of thyroid disease 10/14/2015   • Perimenopausal vasomotor symptoms 2014       Past Medical History:   Diagnosis Date   • Hypothyroid    • Perimenopausal vasomotor symptoms 2014     History reviewed. No pertinent surgical history.      Family History   Problem Relation Age of Onset   • Dementia Mother    • Hypertension Mother    • Diabetes Mother    • Heart Disease Father    • Diabetes Father    • Thyroid Sister    • Cancer Brother         half brother, prostate   • Hyperlipidemia Brother         half brother   • Stroke Neg Hx    • Psychiatric Illness Neg Hx      Family Status   Relation Name Status   • Mo     • Fa  Alive   • Sis  (Not Specified)   • Bro  (Not Specified)   • Bro  (Not Specified)   • Neg Hx  (Not Specified)         Current medicines (including changes today)  Current Outpatient Medications   Medication Sig Dispense Refill   • fluconazole (DIFLUCAN) 150 MG tablet Take 1 Tab by mouth every day. 1 Tab 1     No current facility-administered medications for this visit.            Allergies: Pcn [penicillins]     Preventive Care:  The patient has no Health Maintenance topics of status Overdue, Due On, or Due Soon    OBJECTIVE:   /58 (BP Location: Left arm, Patient Position: Sitting)   Pulse 62   Temp 36.2 °C (97.2 °F) (Temporal)   Ht 1.626 m (5' 4\")   Wt 49.4 kg (108 lb 12.8 oz)   LMP 2017   SpO2 98%   BMI 18.68 kg/m²   Body mass index is 18.68 kg/m².      Gen: Well developed, well nourished in no acute distress.   Skin: Pink, warm, and dry. No rashes  Eyes: conjunctiva non-injected, sclera non-icteric. EOMs intact.   Nasal mucosa without edema nor erythema. No facial tenderness  Ears:Pinna normal. TM pearly gray.   Nose: Nares patent.  No discharge.  Oral mucous membranes pink and moist with no lesions.  Neck: Supple, trachea midline. No adenopathy or masses in the neck or supraclavicular regions. No thyromegaly.  Lungs: Effort is " normal. Clear to auscultation bilaterally with good excursion.  CV: regular rate and rhythm. No murmur.  Abdomen: soft, nontender, + BS. No HSM.  No CVAT  Ext: no edema, color normal, vascularity normal, temperature normal  Alert and oriented Eye contact is good, speech goal directed, affect calm     Breast Exam: Performed with instruction during examination. No axillary lymphadenopathy, no skin changes, no dominant masses. No nipple retraction  Pelvic Exam:  Normal external genitalia with no lesions. Normal vaginal mucosa with normal rugation and no discharge. Cervix with no visible lesions. No cervical motion tenderness. Uterus is normal sized with no masses. No adnexal tenderness or enlargement appreciated. Pap is obtained and the specimen was sent to lab    <ASSESSMENT and PLAN>  1. Well woman exam with routine gynecological exam  Routine anticipatory guidance.  Health counseling as below    2. Calculus of gallbladder without cholecystitis without obstruction  Asymptomatic.  Low fat diet    3. Screening for cervical cancer  Await results  - THINPREP PAP WITH HPV; Future      Discussed  breast self exam, mammography screening, menopause, adequate intake of calcium and vitamin D, diet and exercise     Return in about 1 year (around 8/22/2020).

## 2019-08-27 LAB
CYTOLOGY REG CYTOL: NORMAL
HPV HR 12 DNA CVX QL NAA+PROBE: NEGATIVE
HPV16 DNA SPEC QL NAA+PROBE: NEGATIVE
HPV18 DNA SPEC QL NAA+PROBE: NEGATIVE
SPECIMEN SOURCE: NORMAL

## 2019-11-02 NOTE — ASSESSMENT & PLAN NOTE
Patient complains nasal congestion, sneezing itchy, red, irritated eyes  Has tried over the counter medications- tried Zyrtec and it caused dizziness.  No asthma -  No increased symptoms with exertion.  Symptoms seasonal in spring, fall.     ROS  No fever, chills, sweats.  No productive cough  No sinus pain or pressure  No swollen glands  No rash. No eczema.  Pertinent  ROS findings as above. All other systems reviewed and are negative.       Education: Discussed that no one medicine likely to provide complete relief. Discussed allergen avoidance and environment modification when possible, showering and shampooing before bed, taking shoes off indoors so not tracking pollen in home.  Discussed that meds more effective with daily use.   Advised Zyrtec or Allegra OTC, Nasal steroid Rx, OTC allergy eye drops prn. If not effective, consider immunotherapy.     DISPLAY PLAN FREE TEXT

## 2019-11-04 ENCOUNTER — HOSPITAL ENCOUNTER (OUTPATIENT)
Dept: RADIOLOGY | Facility: MEDICAL CENTER | Age: 49
End: 2019-11-04
Attending: FAMILY MEDICINE
Payer: COMMERCIAL

## 2019-11-04 DIAGNOSIS — Z12.31 VISIT FOR SCREENING MAMMOGRAM: ICD-10-CM

## 2019-11-04 PROCEDURE — 77063 BREAST TOMOSYNTHESIS BI: CPT

## 2020-02-22 ENCOUNTER — OFFICE VISIT (OUTPATIENT)
Dept: URGENT CARE | Facility: CLINIC | Age: 50
End: 2020-02-22
Payer: COMMERCIAL

## 2020-02-22 VITALS
TEMPERATURE: 98.6 F | SYSTOLIC BLOOD PRESSURE: 112 MMHG | OXYGEN SATURATION: 98 % | DIASTOLIC BLOOD PRESSURE: 68 MMHG | BODY MASS INDEX: 18.95 KG/M2 | HEIGHT: 64 IN | WEIGHT: 111 LBS | RESPIRATION RATE: 17 BRPM | HEART RATE: 65 BPM

## 2020-02-22 DIAGNOSIS — L30.9 ECZEMA, UNSPECIFIED TYPE: ICD-10-CM

## 2020-02-22 DIAGNOSIS — L05.91 INFECTED PILONIDAL CYST: ICD-10-CM

## 2020-02-22 PROCEDURE — 99214 OFFICE O/P EST MOD 30 MIN: CPT | Performed by: PHYSICIAN ASSISTANT

## 2020-02-22 RX ORDER — CIPROFLOXACIN 500 MG/1
500 TABLET, FILM COATED ORAL 2 TIMES DAILY
Qty: 10 TAB | Refills: 0 | Status: SHIPPED | OUTPATIENT
Start: 2020-02-22 | End: 2020-02-27

## 2020-02-22 RX ORDER — METRONIDAZOLE 500 MG/1
500 TABLET ORAL 2 TIMES DAILY
Qty: 10 TAB | Refills: 0 | Status: SHIPPED | OUTPATIENT
Start: 2020-02-22 | End: 2020-02-27

## 2020-02-22 RX ORDER — TRIAMCINOLONE ACETONIDE 1 MG/G
CREAM TOPICAL
Qty: 424 G | Refills: 0 | Status: SHIPPED | OUTPATIENT
Start: 2020-02-22 | End: 2023-02-02

## 2020-02-22 ASSESSMENT — ENCOUNTER SYMPTOMS
ABDOMINAL PAIN: 0
SORE THROAT: 0
DIARRHEA: 0
CONSTIPATION: 0
FEVER: 0
FATIGUE: 0
ARTHRALGIAS: 0
ANOREXIA: 0
MYALGIAS: 0

## 2020-02-22 NOTE — PROGRESS NOTES
Subjective:      Mikal Murillo is a 49 y.o. female who presents with Tailbone Pain (tailbone pain x 3 days , pain started out of no where , hurts more when sitting )    PMH:  has a past medical history of Hypothyroid and Perimenopausal vasomotor symptoms (2014).  MEDS:   Current Outpatient Medications:   •  ciprofloxacin (CIPRO) 500 MG Tab, Take 1 Tab by mouth 2 times a day for 5 days., Disp: 10 Tab, Rfl: 0  •  metroNIDAZOLE (FLAGYL) 500 MG Tab, Take 1 Tab by mouth 2 Times a Day for 5 days., Disp: 10 Tab, Rfl: 0  •  triamcinolone acetonide (KENALOG) 0.1 % Cream, Apply thin layer to rash 2 times daily as directed., Disp: 424 g, Rfl: 0  •  fluconazole (DIFLUCAN) 150 MG tablet, Take 1 Tab by mouth every day., Disp: 1 Tab, Rfl: 1  ALLERGIES:   Allergies   Allergen Reactions   • Pcn [Penicillins] Hives     SURGHX: No past surgical history on file.  SOCHX:  reports that she has never smoked. She has never used smokeless tobacco. She reports that she does not drink alcohol or use drugs.  FH: Reviewed with patient, not pertinent to this visit.           Patient presents today for 2 separate complaints.    First complaint: Patient has new onset worsening pilonidal pain.  Patient denies erythema, drainage or swelling over tailbone, but states she has pain at the tameka cleft when she changes positions, lies down, or palpates area.  Patient has never had this before.  Patient denies history of fall, trauma to the area, or recent travel with extended periods of sitting.  Patient denies fever, chills, difficulty having bowel movement.    Second complaint: Patient has a patch of dry skin on the back of her neck that she has had for very long time, tends to come and go itches and she is out of the cream she usually puts on it.  Patient's  states he thinks it is a steroid cream, which when she uses it clears up the rash.    Rash   This is a new problem. The current episode started more than 1 month ago. The problem has  "been waxing and waning since onset. The affected locations include the neck. The rash is characterized by dryness, pain, scaling and itchiness. She was exposed to nothing. Pertinent negatives include no anorexia, diarrhea, fatigue, fever or sore throat. Past treatments include moisturizer. The treatment provided no relief. Her past medical history is significant for eczema.   Cyst   This is a new problem. The current episode started yesterday. The problem occurs constantly. The problem has been gradually worsening. Associated symptoms include a rash. Pertinent negatives include no abdominal pain, anorexia, arthralgias, fatigue, fever, myalgias or sore throat. The symptoms are aggravated by walking and standing (Changing present settings, sitting, palpation over tailbone). She has tried rest and position changes for the symptoms. The treatment provided mild relief.       Review of Systems   Constitutional: Negative for fatigue and fever.   HENT: Negative for sore throat.    Gastrointestinal: Negative for abdominal pain, anorexia, constipation and diarrhea.   Musculoskeletal: Negative for arthralgias and myalgias.   Skin: Positive for itching and rash.   All other systems reviewed and are negative.         Objective:     /68 (BP Location: Left arm, Patient Position: Sitting, BP Cuff Size: Adult)   Pulse 65   Temp 37 °C (98.6 °F) (Temporal)   Resp 17   Ht 1.626 m (5' 4\")   Wt 50.3 kg (111 lb)   LMP 04/23/2017   SpO2 98%   BMI 19.05 kg/m²      Physical Exam  Vitals signs and nursing note reviewed.   Constitutional:       General: She is not in acute distress.     Appearance: She is well-developed.   HENT:      Head: Normocephalic.      Right Ear: External ear normal.      Left Ear: External ear normal.      Nose: Nose normal.   Eyes:      Pupils: Pupils are equal, round, and reactive to light.   Neck:      Musculoskeletal: Normal range of motion and neck supple.   Cardiovascular:      Rate and Rhythm: " Normal rate and regular rhythm.      Heart sounds: Normal heart sounds.   Pulmonary:      Effort: Pulmonary effort is normal.      Breath sounds: Normal breath sounds.   Musculoskeletal: Normal range of motion.   Skin:     General: Skin is warm and dry.      Capillary Refill: Capillary refill takes less than 2 seconds.      Findings: Rash present.          Neurological:      Mental Status: She is alert and oriented to person, place, and time.      Gait: Gait normal.                 Assessment/Plan:     1. Infected pilonidal cyst  ciprofloxacin (CIPRO) 500 MG Tab    metroNIDAZOLE (FLAGYL) 500 MG Tab    triamcinolone acetonide (KENALOG) 0.1 % Cream   2. Eczema, unspecified type  ciprofloxacin (CIPRO) 500 MG Tab    metroNIDAZOLE (FLAGYL) 500 MG Tab    triamcinolone acetonide (KENALOG) 0.1 % Cream       Patient is allergic to penicillin, is unsure if she is ever had amoxicillin or Augmentin.  Up-to-date advises treatment with Cipro and Flagyl, this was prescribed to patient today for treatment of infected pilonidal cyst.    Patient was given topical triamcinolone cream for her eczema.    Patient to begin both prescriptions today.    PT can use cool/warm compress on affected area for relief of symptoms.    Motrin/Advil/Ibuprophen 600 mg every 6 hours as needed for pain or fever.      PT should follow up with PCP in 1-2 days for re-evaluation if symptoms have not improved.  Discussed red flags and reasons to return to UC or ED.  Pt and/or family verbalized understanding of diagnosis and follow up instructions and was offered informational handout on diagnosis.  PT discharged.

## 2020-03-09 ENCOUNTER — OFFICE VISIT (OUTPATIENT)
Dept: MEDICAL GROUP | Facility: LAB | Age: 50
End: 2020-03-09
Payer: COMMERCIAL

## 2020-03-09 VITALS
TEMPERATURE: 97 F | SYSTOLIC BLOOD PRESSURE: 90 MMHG | WEIGHT: 112 LBS | BODY MASS INDEX: 19.12 KG/M2 | HEART RATE: 61 BPM | OXYGEN SATURATION: 95 % | DIASTOLIC BLOOD PRESSURE: 58 MMHG | HEIGHT: 64 IN | RESPIRATION RATE: 16 BRPM

## 2020-03-09 DIAGNOSIS — L05.91 PILONIDAL CYST: ICD-10-CM

## 2020-03-09 DIAGNOSIS — B35.4 TINEA CORPORIS: ICD-10-CM

## 2020-03-09 PROCEDURE — 99214 OFFICE O/P EST MOD 30 MIN: CPT | Performed by: FAMILY MEDICINE

## 2020-03-09 RX ORDER — CLOTRIMAZOLE AND BETAMETHASONE DIPROPIONATE 10; .64 MG/G; MG/G
CREAM TOPICAL
Qty: 60 G | Refills: 2 | Status: SHIPPED | OUTPATIENT
Start: 2020-03-09 | End: 2023-12-07

## 2020-03-09 ASSESSMENT — PATIENT HEALTH QUESTIONNAIRE - PHQ9: CLINICAL INTERPRETATION OF PHQ2 SCORE: 0

## 2020-03-11 NOTE — ASSESSMENT & PLAN NOTE
Patient is requesting a refill of the lotrisone for her rash.  This has worked well to control it in the past.  No new contacts.

## 2020-03-11 NOTE — ASSESSMENT & PLAN NOTE
Patient was seen in the urgent care on 2/22/2020 for an infected pilonidal cyst.  She reports that it was red and draining at the time.  It is since dried up but is still little tender.  She has not had a previous problem with this.  She has had herpes outbreaks on the buttocks cheek but this is different.

## 2020-03-11 NOTE — PROGRESS NOTES
Subjective:     Chief Complaint   Patient presents with   • Other     cyst on tailbone       Mikal Murillo is a 49 y.o. female here today for evaluation and management of:    Tinea corporis  Patient is requesting a refill of the lotrisone for her rash.  This has worked well to control it in the past.  No new contacts.    Pilonidal cyst  Patient was seen in the urgent care on 2/22/2020 for an infected pilonidal cyst.  She reports that it was red and draining at the time.  It is since dried up but is still little tender.  She has not had a previous problem with this.  She has had herpes outbreaks on the buttocks cheek but this is different.       Allergies   Allergen Reactions   • Pcn [Penicillins] Hives       Current medicines (including changes today)  Current Outpatient Medications   Medication Sig Dispense Refill   • clotrimazole-betamethasone (LOTRISONE) 1-0.05 % Cream Apply to rash BID until clear 60 g 2   • triamcinolone acetonide (KENALOG) 0.1 % Cream Apply thin layer to rash 2 times daily as directed. 424 g 0     No current facility-administered medications for this visit.        She  has a past medical history of Hypothyroid and Perimenopausal vasomotor symptoms (5/6/2014).    Patient Active Problem List    Diagnosis Date Noted   • Tinea corporis 03/09/2020   • Pilonidal cyst 03/09/2020   • Calculus of gallbladder without cholecystitis without obstruction 08/22/2019   • Gastroesophageal reflux disease without esophagitis 08/06/2019   • Dysuria 04/25/2019   • Changing pigmented skin lesion 04/25/2019   • Other chest pain 02/20/2019   • Dizziness 02/20/2019   • Blood in stool 02/20/2019   • Elevated TSH 07/10/2018   • Lateral epicondylitis of right elbow 05/30/2018   • Anxiety disorder 02/14/2018   • Neck pain, acute 01/03/2018   • Herpes simplex infection 06/01/2017   • Inguinal lymphadenopathy 06/01/2017   • Hypoglycemia 12/05/2016   • Leukopenia 12/05/2016   • Vitamin D deficiency 08/17/2016   • Intrinsic  "eczema 07/29/2016   • Allergic rhinitis due to pollen 07/29/2016   • History of thyroid disease 10/14/2015   • Perimenopausal vasomotor symptoms 05/06/2014       ROS   No fever or chills.  No nausea or vomiting.  No chest pain or palpitations.  No cough or SOB.  No pain with urination or hematuria.  No black or bloody stools.       Objective:     BP (!) 90/58 (BP Location: Right arm, Patient Position: Sitting, BP Cuff Size: Adult)   Pulse 61   Temp 36.1 °C (97 °F) (Temporal)   Resp 16   Ht 1.626 m (5' 4\")   Wt 50.8 kg (112 lb)   SpO2 95%  Body mass index is 19.22 kg/m².   Physical Exam:  Well developed, well nourished.  Alert, oriented in no acute distress.  Eye contact is good, speech goal directed, affect calm  Eyes: conjunctiva non-injected, sclera non-icteric.  Mouth: Oral mucous membranes pink and moist with no lesions.  Neck Supple.  No adenopathy or masses in the neck or supraclavicular regions. No thyromegaly  Lungs: clear to auscultation bilaterally with good excursion. No wheezes or rhonchi  CV: regular rate and rhythm. No murmur  Abdomen: soft, nontender, no masses or organomegaly.  No rebound or guarding  Healing lesion at the top of the crease of the buttocks without any fluctuance, erythema or drainage  Skin: Erythematous raised rash with satellite lesions          Assessment and Plan:   The following treatment plan was discussed    1. Tinea corporis  Lotrisone cream twice a day until clear.  Call if not improving  - clotrimazole-betamethasone (LOTRISONE) 1-0.05 % Cream; Apply to rash BID until clear  Dispense: 60 g; Refill: 2    2. Pilonidal cyst  Discussed referral to a surgeon.  Patient education materials given.  She would like to wait and see if it happens again to decide on a surgical consult.    Any change or worsening of signs or symptoms, patient encouraged to follow-up or report to the emergency room for further evaluation. Patient understands and agrees.    Followup: Return if symptoms " worsen or fail to improve.

## 2020-08-14 ENCOUNTER — OFFICE VISIT (OUTPATIENT)
Dept: URGENT CARE | Facility: CLINIC | Age: 50
End: 2020-08-14
Payer: COMMERCIAL

## 2020-08-14 VITALS
DIASTOLIC BLOOD PRESSURE: 66 MMHG | RESPIRATION RATE: 12 BRPM | HEART RATE: 78 BPM | OXYGEN SATURATION: 97 % | HEIGHT: 64 IN | SYSTOLIC BLOOD PRESSURE: 100 MMHG | WEIGHT: 109.4 LBS | TEMPERATURE: 97.5 F | BODY MASS INDEX: 18.68 KG/M2

## 2020-08-14 DIAGNOSIS — R07.89 CHEST DISCOMFORT: ICD-10-CM

## 2020-08-14 DIAGNOSIS — Z87.19 HISTORY OF GASTROESOPHAGEAL REFLUX (GERD): ICD-10-CM

## 2020-08-14 PROCEDURE — 93000 ELECTROCARDIOGRAM COMPLETE: CPT | Performed by: NURSE PRACTITIONER

## 2020-08-14 PROCEDURE — 99213 OFFICE O/P EST LOW 20 MIN: CPT | Performed by: NURSE PRACTITIONER

## 2020-08-14 ASSESSMENT — ENCOUNTER SYMPTOMS
HEADACHES: 0
HEARTBURN: 1
FEVER: 0
DIZZINESS: 0
MYALGIAS: 0
CHILLS: 0
VOMITING: 1
NAUSEA: 1
ABDOMINAL PAIN: 1

## 2020-08-14 NOTE — PROGRESS NOTES
Subjective:      Mikal Murillo is a 50 y.o. female who presents with GI Problem (burning feeling and nausea and vomiting x3 days)            HPI New. Patient evaluated and treated via [facility]  [id number]. 50 year old female with burning feeling, chest pressure and n/v for the past 3 days. She denies fever, chills, diarrhea or constipation. She has had chest pressure last night. Started prilosec 2 days ago. History of GERD but does not taken anything regular.  Pcn [penicillins]  Current Outpatient Medications on File Prior to Visit   Medication Sig Dispense Refill   • clotrimazole-betamethasone (LOTRISONE) 1-0.05 % Cream Apply to rash BID until clear 60 g 2   • triamcinolone acetonide (KENALOG) 0.1 % Cream Apply thin layer to rash 2 times daily as directed. 424 g 0     No current facility-administered medications on file prior to visit.      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   Social Needs   • Financial resource strain: Not on file   • Food insecurity     Worry: Not on file     Inability: Not on file   • Transportation needs     Medical: Not on file     Non-medical: Not on file   Tobacco Use   • Smoking status: Never Smoker   • Smokeless tobacco: Never Used   Substance and Sexual Activity   • Alcohol use: No     Alcohol/week: 0.0 oz   • Drug use: No   • Sexual activity: Yes     Partners: Male   Lifestyle   • Physical activity     Days per week: Not on file     Minutes per session: Not on file   • Stress: Not on file   Relationships   • Social connections     Talks on phone: Not on file     Gets together: Not on file     Attends Taoist service: Not on file     Active member of club or organization: Not on file     Attends meetings of clubs or organizations: Not on file     Relationship  "status: Not on file   • Intimate partner violence     Fear of current or ex partner: Not on file     Emotionally abused: Not on file     Physically abused: Not on file     Forced sexual activity: Not on file   Other Topics Concern   •  Service Not Asked   • Blood Transfusions Not Asked   • Caffeine Concern Not Asked   • Occupational Exposure Not Asked   • Hobby Hazards Not Asked   • Sleep Concern Not Asked   • Stress Concern Not Asked   • Weight Concern Not Asked   • Special Diet Not Asked   • Back Care Not Asked   • Exercise Not Asked   • Bike Helmet Not Asked   • Seat Belt Yes   • Self-Exams Not Asked   Social History Narrative    She is chinese,     Lives with her current 2nd  who is [place] and his kid    Children: 1 boy 22 yr old from previous     Work: stay at home     Breast Cancer-related family history is not on file.      Review of Systems   Constitutional: Negative for chills and fever.   Cardiovascular: Positive for chest pain.   Gastrointestinal: Positive for abdominal pain, heartburn, nausea and vomiting.   Musculoskeletal: Negative for myalgias.   Neurological: Negative for dizziness and headaches.          Objective:     /66 (BP Location: Left arm, Patient Position: Sitting, BP Cuff Size: Adult)   Pulse 78   Temp 36.4 °C (97.5 °F)   Resp 12   Ht 1.626 m (5' 4\")   Wt 49.6 kg (109 lb 6.4 oz)   LMP 04/23/2017   SpO2 97%   BMI 18.78 kg/m²      Physical Exam  Vitals signs and nursing note reviewed.   Constitutional:       General: She is not in acute distress.     Appearance: Normal appearance. She is well-developed.   Cardiovascular:      Rate and Rhythm: Normal rate and regular rhythm.      Heart sounds: Normal heart sounds. No murmur.   Pulmonary:      Effort: Pulmonary effort is normal. No respiratory distress.      Breath sounds: Normal breath sounds.   Abdominal:      General: Bowel sounds are normal.      Palpations: Abdomen is soft.      Tenderness: There is " abdominal tenderness in the epigastric area.   Musculoskeletal: Normal range of motion.      Comments: Moves all 4 extremities normally   Skin:     General: Skin is warm and dry.   Neurological:      Mental Status: She is alert and oriented to person, place, and time.   Psychiatric:         Behavior: Behavior normal.         Thought Content: Thought content normal.                 Assessment/Plan:        1. Chest discomfort  EKG - Clinic Performed   2. History of gastroesophageal reflux (GERD)       Likely GERD.  EKG normal with NSR, rate of 74, no ectopy or ST deviation.  Patient requesting blood workup but I have referred her to her PCP for this.  prilosec   Differential diagnosis, natural history, supportive care, and indications for immediate follow-up discussed at length.

## 2020-08-26 ENCOUNTER — OFFICE VISIT (OUTPATIENT)
Dept: MEDICAL GROUP | Facility: LAB | Age: 50
End: 2020-08-26
Payer: COMMERCIAL

## 2020-08-26 VITALS
BODY MASS INDEX: 18.27 KG/M2 | HEART RATE: 56 BPM | HEIGHT: 64 IN | DIASTOLIC BLOOD PRESSURE: 60 MMHG | SYSTOLIC BLOOD PRESSURE: 94 MMHG | OXYGEN SATURATION: 98 % | WEIGHT: 107 LBS | TEMPERATURE: 97.3 F | RESPIRATION RATE: 16 BRPM

## 2020-08-26 DIAGNOSIS — K21.9 GASTROESOPHAGEAL REFLUX DISEASE WITHOUT ESOPHAGITIS: ICD-10-CM

## 2020-08-26 DIAGNOSIS — Z13.220 SCREENING FOR HYPERLIPIDEMIA: ICD-10-CM

## 2020-08-26 DIAGNOSIS — B00.9 HERPES SIMPLEX INFECTION: ICD-10-CM

## 2020-08-26 DIAGNOSIS — Z13.0 SCREENING FOR DEFICIENCY ANEMIA: ICD-10-CM

## 2020-08-26 DIAGNOSIS — Z12.11 SCREENING FOR COLON CANCER: ICD-10-CM

## 2020-08-26 DIAGNOSIS — Z13.29 SCREENING FOR THYROID DISORDER: ICD-10-CM

## 2020-08-26 DIAGNOSIS — Z13.1 SCREENING FOR DIABETES MELLITUS: ICD-10-CM

## 2020-08-26 DIAGNOSIS — K62.5 RECTAL BLEEDING: ICD-10-CM

## 2020-08-26 DIAGNOSIS — Z13.21 ENCOUNTER FOR VITAMIN DEFICIENCY SCREENING: ICD-10-CM

## 2020-08-26 PROCEDURE — 99214 OFFICE O/P EST MOD 30 MIN: CPT | Performed by: FAMILY MEDICINE

## 2020-08-26 RX ORDER — VALACYCLOVIR HYDROCHLORIDE 500 MG/1
500 TABLET, FILM COATED ORAL 2 TIMES DAILY
Qty: 20 TAB | Refills: 3 | Status: SHIPPED | OUTPATIENT
Start: 2020-08-26 | End: 2020-08-31

## 2020-08-26 NOTE — ASSESSMENT & PLAN NOTE
Patient had an outbreak of herpes simplex at the anterior end of the gluteal fold.  She started Valtrex for it she is only taken 2 doses thus far.

## 2020-08-26 NOTE — PATIENT INSTRUCTIONS
Hemorrhoids  Hemorrhoids are swollen veins that may develop:  · In the butt (rectum). These are called internal hemorrhoids.  · Around the opening of the butt (anus). These are called external hemorrhoids.  Hemorrhoids can cause pain, itching, or bleeding. Most of the time, they do not cause serious problems. They usually get better with diet changes, lifestyle changes, and other home treatments.  What are the causes?  This condition may be caused by:  · Having trouble pooping (constipation).  · Pushing hard (straining) to poop.  · Watery poop (diarrhea).  · Pregnancy.  · Being very overweight (obese).  · Sitting for long periods of time.  · Heavy lifting or other activity that causes you to strain.  · Anal sex.  · Riding a bike for a long period of time.  What are the signs or symptoms?  Symptoms of this condition include:  · Pain.  · Itching or soreness in the butt.  · Bleeding from the butt.  · Leaking poop.  · Swelling in the area.  · One or more lumps around the opening of your butt.  How is this diagnosed?  A doctor can often diagnose this condition by looking at the affected area. The doctor may also:  · Do an exam that involves feeling the area with a gloved hand (digital rectal exam).  · Examine the area inside your butt using a small tube (anoscope).  · Order blood tests. This may be done if you have lost a lot of blood.  · Have you get a test that involves looking inside the colon using a flexible tube with a camera on the end (sigmoidoscopy or colonoscopy).  How is this treated?  This condition can usually be treated at home. Your doctor may tell you to change what you eat, make lifestyle changes, or try home treatments. If these do not help, procedures can be done to remove the hemorrhoids or make them smaller. These may involve:  · Placing rubber bands at the base of the hemorrhoids to cut off their blood supply.  · Injecting medicine into the hemorrhoids to shrink them.  · Shining a type of light  energy onto the hemorrhoids to cause them to fall off.  · Doing surgery to remove the hemorrhoids or cut off their blood supply.  Follow these instructions at home:  Eating and drinking    · Eat foods that have a lot of fiber in them. These include whole grains, beans, nuts, fruits, and vegetables.  · Ask your doctor about taking products that have added fiber (fibersupplements).  · Reduce the amount of fat in your diet. You can do this by:  ? Eating low-fat dairy products.  ? Eating less red meat.  ? Avoiding processed foods.  · Drink enough fluid to keep your pee (urine) pale yellow.  Managing pain and swelling    · Take a warm-water bath (sitz bath) for 20 minutes to ease pain. Do this 3-4 times a day. You may do this in a bathtub or using a portable sitz bath that fits over the toilet.  · If told, put ice on the painful area. It may be helpful to use ice between your warm baths.  ? Put ice in a plastic bag.  ? Place a towel between your skin and the bag.  ? Leave the ice on for 20 minutes, 2-3 times a day.  General instructions  · Take over-the-counter and prescription medicines only as told by your doctor.  ? Medicated creams and medicines may be used as told.  · Exercise often. Ask your doctor how much and what kind of exercise is best for you.  · Go to the bathroom when you have the urge to poop. Do not wait.  · Avoid pushing too hard when you poop.  · Keep your butt dry and clean. Use wet toilet paper or moist towelettes after pooping.  · Do not sit on the toilet for a long time.  · Keep all follow-up visits as told by your doctor. This is important.  Contact a doctor if you:  · Have pain and swelling that do not get better with treatment or medicine.  · Have trouble pooping.  · Cannot poop.  · Have pain or swelling outside the area of the hemorrhoids.  Get help right away if you have:  · Bleeding that will not stop.  Summary  · Hemorrhoids are swollen veins in the butt or around the opening of the  butt.  · They can cause pain, itching, or bleeding.  · Eat foods that have a lot of fiber in them. These include whole grains, beans, nuts, fruits, and vegetables.  · Take a warm-water bath (sitz bath) for 20 minutes to ease pain. Do this 3-4 times a day.  This information is not intended to replace advice given to you by your health care provider. Make sure you discuss any questions you have with your health care provider.  Document Released: 09/26/2009 Document Revised: 12/26/2019 Document Reviewed: 05/09/2019  Elsevier Patient Education © 2020 [employer]

## 2020-08-26 NOTE — ASSESSMENT & PLAN NOTE
Patient was seen in urgent care on 8/14/2020 for some midsternal chest pain.  This occurs in the middle of the night.  Her EKG was normal.  She does have a history of GERD and this was felt to be an exacerbation of that.  She started on omeprazole 20 mg daily and that has seemed to help.

## 2020-08-26 NOTE — ASSESSMENT & PLAN NOTE
Last week patient had one episode of bright red blood in her stool after eating.  She also has had some itching in the area.  She denies any recurrence of the symptoms.  She has not had a colonoscopy and turned 50 earlier this year.  She denies any unusual weight loss.

## 2020-08-26 NOTE — PROGRESS NOTES
Subjective:     Chief Complaint   Patient presents with   • Requesting Labs     for dizziness, and joint pain   • Gastrophageal Reflux     chest pressure, went to  8/14/2020, continue prilosec?   • Bloody Stools     last week, due for colonoscopy?   • Medication Refill     valtrex and prilosec       Mikal Murillo is a 50 y.o. female here today for evaluation and management of:    Gastroesophageal reflux disease without esophagitis  Patient was seen in urgent care on 8/14/2020 for some midsternal chest pain.  This occurs in the middle of the night.  Her EKG was normal.  She does have a history of GERD and this was felt to be an exacerbation of that.  She started on omeprazole 20 mg daily and that has seemed to help.    Herpes simplex infection  Patient had an outbreak of herpes simplex at the anterior end of the gluteal fold.  She started Valtrex for it she is only taken 2 doses thus far.    Rectal bleeding  Last week patient had one episode of bright red blood in her stool after eating.  She also has had some itching in the area.  She denies any recurrence of the symptoms.  She has not had a colonoscopy and turned 50 earlier this year.  She denies any unusual weight loss.       Allergies   Allergen Reactions   • Pcn [Penicillins] Hives       Current medicines (including changes today)  Current Outpatient Medications   Medication Sig Dispense Refill   • valACYclovir (VALTREX) 500 MG Tab Take 1 Tab by mouth 2 times a day for 5 days. 20 Tab 3   • clotrimazole-betamethasone (LOTRISONE) 1-0.05 % Cream Apply to rash BID until clear 60 g 2   • triamcinolone acetonide (KENALOG) 0.1 % Cream Apply thin layer to rash 2 times daily as directed. 424 g 0     No current facility-administered medications for this visit.        She  has a past medical history of Hypothyroid and Perimenopausal vasomotor symptoms (5/6/2014).    Patient Active Problem List    Diagnosis Date Noted   • Rectal bleeding 08/26/2020   • Tinea corporis  "03/09/2020   • Pilonidal cyst 03/09/2020   • Calculus of gallbladder without cholecystitis without obstruction 08/22/2019   • Gastroesophageal reflux disease without esophagitis 08/06/2019   • Dysuria 04/25/2019   • Changing pigmented skin lesion 04/25/2019   • Other chest pain 02/20/2019   • Dizziness 02/20/2019   • Blood in stool 02/20/2019   • Elevated TSH 07/10/2018   • Lateral epicondylitis of right elbow 05/30/2018   • Anxiety disorder 02/14/2018   • Neck pain, acute 01/03/2018   • Herpes simplex infection 06/01/2017   • Inguinal lymphadenopathy 06/01/2017   • Hypoglycemia 12/05/2016   • Leukopenia 12/05/2016   • Vitamin D deficiency 08/17/2016   • Intrinsic eczema 07/29/2016   • Allergic rhinitis due to pollen 07/29/2016   • History of thyroid disease 10/14/2015   • Perimenopausal vasomotor symptoms 05/06/2014       ROS   No fever or chills.  No nausea or vomiting.  No chest pain or palpitations.  No cough or SOB.  No pain with urination or hematuria.  No black or bloody stools.       Objective:     BP (!) 94/60 (BP Location: Right arm, Patient Position: Sitting, BP Cuff Size: Adult)   Pulse (!) 56   Temp 36.3 °C (97.3 °F) (Temporal)   Resp 16   Ht 1.626 m (5' 4\")   Wt 48.5 kg (107 lb)   SpO2 98%  Body mass index is 18.37 kg/m².   Physical Exam:  Well developed, well nourished.  Alert, oriented in no acute distress.  Eye contact is good, speech goal directed, affect calm  Eyes: conjunctiva non-injected, sclera non-icteric.  Neck Supple.  No adenopathy or masses in the neck or supraclavicular regions. No thyromegaly  Lungs: clear to auscultation bilaterally with good excursion. No wheezes or rhonchi  CV: regular rate and rhythm. No murmur  Abdomen: soft, nontender, no masses or organomegaly.  No rebound or guarding  Rectal: small external hemorrhoidal tags.  There is a vesicular rash at the top of the gluteal fold              Assessment and Plan:   The following treatment plan was discussed    1. Rectal " bleeding  Most likely secondary to hemorrhoids.  Advised using a stool softener and Preparation H cream for the itching.  Patient education materials given.  Refer to gastroenterology  - REFERRAL TO GASTROENTEROLOGY    2. Screening for colon cancer  Refer to gastroenterology for further evaluation and treatment  - REFERRAL TO GASTROENTEROLOGY    3. Gastroesophageal reflux disease without esophagitis  Continue omeprazole 20 mg daily.  Refer to GI  - REFERRAL TO GASTROENTEROLOGY    4. Herpes simplex infection  Valtrex 50 mg twice a day for 5 days.  - valACYclovir (VALTREX) 500 MG Tab; Take 1 Tab by mouth 2 times a day for 5 days.  Dispense: 20 Tab; Refill: 3    5. Screening for deficiency anemia  Screening labs ordered.  Await results for counseling.    - CBC WITH DIFFERENTIAL; Future    6. Screening for diabetes mellitus  Screening labs ordered.  Await results for counseling.    - Comp Metabolic Panel; Future    7. Screening for hyperlipidemia  Screening labs ordered.  Await results for counseling.    - Lipid Profile; Future    8. Screening for thyroid disorder  Screening labs ordered.  Await results for counseling.    - TSH; Future  - FREE THYROXINE; Future    9. Encounter for vitamin deficiency screening  Screening labs ordered.  Await results for counseling.    - VITAMIN D,25 HYDROXY; Future    Any change or worsening of signs or symptoms, patient encouraged to follow-up or report to the emergency room for further evaluation. Patient understands and agrees.    Followup: Return in about 6 months (around 2/26/2021).

## 2020-09-03 ENCOUNTER — NON-PROVIDER VISIT (OUTPATIENT)
Dept: MEDICAL GROUP | Facility: LAB | Age: 50
End: 2020-09-03
Payer: COMMERCIAL

## 2020-09-03 ENCOUNTER — TELEPHONE (OUTPATIENT)
Dept: MEDICAL GROUP | Facility: LAB | Age: 50
End: 2020-09-03

## 2020-09-03 DIAGNOSIS — Z23 NEED FOR VACCINATION: ICD-10-CM

## 2020-09-03 PROCEDURE — 90471 IMMUNIZATION ADMIN: CPT | Performed by: FAMILY MEDICINE

## 2020-09-03 PROCEDURE — 90750 HZV VACC RECOMBINANT IM: CPT | Performed by: FAMILY MEDICINE

## 2020-09-03 NOTE — PROGRESS NOTES
"Mikal Murillo is a 50 y.o. female here for a non-provider visit for:   SHINGRIX (Shingles)    Reason for immunization: Overdue/Provider Recommended  Immunization records indicate need for vaccine: Yes, confirmed with Epic  Minimum interval has been met for this vaccine: Yes  ABN completed: Not Indicated    Order and dose verified by: ms  CARMEN Dated  10/30/2019 was given to patient: Yes  All IAC Questionnaire questions were answered \"No.\"    Patient tolerated injection and no adverse effects were observed or reported: Yes    Pt scheduled for next dose in series: No  "

## 2020-09-09 ENCOUNTER — OFFICE VISIT (OUTPATIENT)
Dept: MEDICAL GROUP | Facility: LAB | Age: 50
End: 2020-09-09
Payer: COMMERCIAL

## 2020-09-09 VITALS
SYSTOLIC BLOOD PRESSURE: 96 MMHG | HEART RATE: 65 BPM | TEMPERATURE: 98.1 F | DIASTOLIC BLOOD PRESSURE: 50 MMHG | BODY MASS INDEX: 18.27 KG/M2 | OXYGEN SATURATION: 97 % | HEIGHT: 64 IN | RESPIRATION RATE: 16 BRPM | WEIGHT: 107 LBS

## 2020-09-09 DIAGNOSIS — E78.5 DYSLIPIDEMIA: ICD-10-CM

## 2020-09-09 DIAGNOSIS — E03.9 ACQUIRED HYPOTHYROIDISM: ICD-10-CM

## 2020-09-09 DIAGNOSIS — E55.9 VITAMIN D DEFICIENCY: ICD-10-CM

## 2020-09-09 DIAGNOSIS — D70.8 OTHER NEUTROPENIA (HCC): ICD-10-CM

## 2020-09-09 DIAGNOSIS — K21.9 GASTROESOPHAGEAL REFLUX DISEASE WITHOUT ESOPHAGITIS: ICD-10-CM

## 2020-09-09 PROCEDURE — 99214 OFFICE O/P EST MOD 30 MIN: CPT | Performed by: FAMILY MEDICINE

## 2020-09-09 RX ORDER — LEVOTHYROXINE SODIUM 0.05 MG/1
50 TABLET ORAL
Qty: 30 TAB | Refills: 3 | Status: SHIPPED | OUTPATIENT
Start: 2020-09-09 | End: 2021-03-26 | Stop reason: SDUPTHER

## 2020-09-09 NOTE — PATIENT INSTRUCTIONS
Hypothyroidism  Hypothyroidism is a condition due to under activity of the thyroid gland.  CAUSES   Hypothyroidism may be due to thyroid surgery or disease.   SYMPTOMS   Symptoms are often vague. They may include:  · Fatigue.   · Mental dullness.   · Weakness.   · Hoarseness.   · Constipation.   · Swelling in the face, hands or feet.   · Dry skin.   · Hair loss.   · Sensitivity to cold.   · Menstrual problems.   · Patients with hypothyroidism are also more likely to have problems with infections.   DIAGNOSIS   The diagnosis is confirmed by lab tests for levels of thyroid hormones (TSH, T3, and T4). These tests are also used to monitor treatment.  TREATMENT   Treatment includes the gradual replacement of thyroid hormones. It is very important that you take your thyroid medicine as prescribed daily, even after you begin to feel better. You will probably require thyroid medicine for the rest of your life. To reduce constipation, eat a diet high in fruits and vegetables. Try to get some exercise every day. Please see your doctor for follow up care as recommended so your treatment can be adjusted as your condition improves.   SEEK IMMEDIATE MEDICAL CARE IF:   You have chest pain, palpitations, shortness of breath, or any other serious symptoms.  Document Released: 01/25/2006 Document Revised: 03/11/2013 Document Reviewed: 12/18/2006  [employer]® Patient Information ©2013 [employer].

## 2020-09-10 NOTE — PROGRESS NOTES
Subjective:     Chief Complaint   Patient presents with   • Lab Results       Mikal Murillo is a 50 y.o. female here today for evaluation and management of:    Acquired hypothyroidism  Patient was on thyroid medications for many years but it looks like she stopped taking them in 2016.  She is unsure why she did that but she thinks she did not get a refill and just forgot.    Dyslipidemia  Patient has a high cholesterol and high LDL but also high HDL.  Her ratio is 2.8.  I explained that this puts her at low risk.  Mediterranean diet information given    Gastroesophageal reflux disease without esophagitis  Patient stopped taking her PPI because she was worried about taking it chronically.  She is having some more stomach problems.  She denies any black or bloody stools.  She is scheduled to see gastroenterology later this month.    Other neutropenia (HCC)  Patient is not having recurrent infections.       Allergies   Allergen Reactions   • Pcn [Penicillins] Hives       Current medicines (including changes today)  Current Outpatient Medications   Medication Sig Dispense Refill   • levothyroxine (SYNTHROID) 50 MCG Tab Take 1 Tab by mouth Every morning on an empty stomach. 30 Tab 3   • clotrimazole-betamethasone (LOTRISONE) 1-0.05 % Cream Apply to rash BID until clear 60 g 2   • triamcinolone acetonide (KENALOG) 0.1 % Cream Apply thin layer to rash 2 times daily as directed. 424 g 0     No current facility-administered medications for this visit.        She  has a past medical history of Hypothyroid and Perimenopausal vasomotor symptoms (5/6/2014).    Patient Active Problem List    Diagnosis Date Noted   • Dyslipidemia 09/09/2020   • Rectal bleeding 08/26/2020   • Tinea corporis 03/09/2020   • Pilonidal cyst 03/09/2020   • Calculus of gallbladder without cholecystitis without obstruction 08/22/2019   • Gastroesophageal reflux disease without esophagitis 08/06/2019   • Dysuria 04/25/2019   • Changing pigmented skin lesion  "04/25/2019   • Other chest pain 02/20/2019   • Dizziness 02/20/2019   • Blood in stool 02/20/2019   • Acquired hypothyroidism 07/10/2018   • Lateral epicondylitis of right elbow 05/30/2018   • Anxiety disorder 02/14/2018   • Neck pain, acute 01/03/2018   • Herpes simplex infection 06/01/2017   • Inguinal lymphadenopathy 06/01/2017   • Hypoglycemia 12/05/2016   • Other neutropenia (HCC) 12/05/2016   • Vitamin D deficiency 08/17/2016   • Intrinsic eczema 07/29/2016   • Allergic rhinitis due to pollen 07/29/2016   • History of thyroid disease 10/14/2015   • Perimenopausal vasomotor symptoms 05/06/2014       ROS   No fever or chills.  No nausea or vomiting.  No chest pain or palpitations.  No cough or SOB.  No pain with urination or hematuria.  No black or bloody stools.       Objective:     BP (!) 96/50 (BP Location: Right arm, Patient Position: Sitting, BP Cuff Size: Adult)   Pulse 65   Temp 36.7 °C (98.1 °F) (Temporal)   Resp 16   Ht 1.626 m (5' 4\")   Wt 48.5 kg (107 lb)   SpO2 97%  Body mass index is 18.37 kg/m².   Physical Exam:  Well developed, well nourished.  Alert, oriented in no acute distress.  Eye contact is good, speech goal directed, affect calm  Eyes: conjunctiva non-injected, sclera non-icteric.  Neck Supple.  No adenopathy or masses in the neck or supraclavicular regions. No thyromegaly  Lungs: clear to auscultation bilaterally with good excursion. No wheezes or rhonchi  CV: regular rate and rhythm. No murmur        Assessment and Plan:   The following treatment plan was discussed    1. Acquired hypothyroidism  This is a new problem to me.  Start levothyroxine 50 mcg daily.  Recheck labs in 3 months  - levothyroxine (SYNTHROID) 50 MCG Tab; Take 1 Tab by mouth Every morning on an empty stomach.  Dispense: 30 Tab; Refill: 3  - TSH; Future  - FREE THYROXINE; Future    2. Gastroesophageal reflux disease without esophagitis  Follow-up with gastroenterology as scheduled    3. Vitamin D " deficiency  Supplement with 2 to 4000 units daily    4. Dyslipidemia  Patient has a low risk ratio.  Continue Mediterranean diet information.    5. Other neutropenia (HCC)  Repeat CBC in 3 months  - CBC WITH DIFFERENTIAL; Future    Any change or worsening of signs or symptoms, patient encouraged to follow-up or report to the emergency room for further evaluation. Patient understands and agrees.    Followup: Return in about 3 months (around 12/9/2020).

## 2020-09-10 NOTE — ASSESSMENT & PLAN NOTE
Patient was on thyroid medications for many years but it looks like she stopped taking them in 2016.  She is unsure why she did that but she thinks she did not get a refill and just forgot.

## 2020-09-10 NOTE — ASSESSMENT & PLAN NOTE
Patient has a high cholesterol and high LDL but also high HDL.  Her ratio is 2.8.  I explained that this puts her at low risk.  Mediterranean diet information given

## 2020-09-10 NOTE — ASSESSMENT & PLAN NOTE
Patient stopped taking her PPI because she was worried about taking it chronically.  She is having some more stomach problems.  She denies any black or bloody stools.  She is scheduled to see gastroenterology later this month.

## 2020-11-04 ENCOUNTER — HOSPITAL ENCOUNTER (OUTPATIENT)
Dept: RADIOLOGY | Facility: MEDICAL CENTER | Age: 50
End: 2020-11-04
Attending: FAMILY MEDICINE
Payer: COMMERCIAL

## 2020-11-04 DIAGNOSIS — Z12.31 VISIT FOR SCREENING MAMMOGRAM: ICD-10-CM

## 2020-11-04 PROCEDURE — 77067 SCR MAMMO BI INCL CAD: CPT

## 2020-11-16 ENCOUNTER — PATIENT MESSAGE (OUTPATIENT)
Dept: MEDICAL GROUP | Facility: LAB | Age: 50
End: 2020-11-16

## 2020-12-07 ENCOUNTER — PATIENT MESSAGE (OUTPATIENT)
Dept: MEDICAL GROUP | Facility: LAB | Age: 50
End: 2020-12-07

## 2021-02-18 ENCOUNTER — APPOINTMENT (OUTPATIENT)
Dept: MEDICAL GROUP | Facility: LAB | Age: 51
End: 2021-02-18

## 2021-03-08 ENCOUNTER — OFFICE VISIT (OUTPATIENT)
Dept: MEDICAL GROUP | Facility: LAB | Age: 51
End: 2021-03-08
Payer: COMMERCIAL

## 2021-03-08 VITALS
TEMPERATURE: 97.4 F | SYSTOLIC BLOOD PRESSURE: 84 MMHG | RESPIRATION RATE: 16 BRPM | WEIGHT: 106 LBS | DIASTOLIC BLOOD PRESSURE: 50 MMHG | OXYGEN SATURATION: 97 % | HEART RATE: 65 BPM | HEIGHT: 64 IN | BODY MASS INDEX: 18.1 KG/M2

## 2021-03-08 DIAGNOSIS — Z12.11 SCREENING FOR COLORECTAL CANCER: ICD-10-CM

## 2021-03-08 DIAGNOSIS — Z78.0 MENOPAUSE: ICD-10-CM

## 2021-03-08 DIAGNOSIS — R42 DIZZINESS: ICD-10-CM

## 2021-03-08 DIAGNOSIS — Z12.12 SCREENING FOR COLORECTAL CANCER: ICD-10-CM

## 2021-03-08 DIAGNOSIS — H53.9 VISUAL CHANGES: ICD-10-CM

## 2021-03-08 PROCEDURE — 99214 OFFICE O/P EST MOD 30 MIN: CPT | Performed by: FAMILY MEDICINE

## 2021-03-08 ASSESSMENT — PATIENT HEALTH QUESTIONNAIRE - PHQ9: CLINICAL INTERPRETATION OF PHQ2 SCORE: 0

## 2021-03-08 NOTE — PATIENT INSTRUCTIONS
Menopause  Menopause is the normal time of life when menstrual periods stop completely. It is usually confirmed by 12 months without a menstrual period. The transition to menopause (perimenopause) most often happens between the ages of 45 and 55. During perimenopause, hormone levels change in your body, which can cause symptoms and affect your health. Menopause may increase your risk for:  · Loss of bone (osteoporosis), which causes bone breaks (fractures).  · Depression.  · Hardening and narrowing of the arteries (atherosclerosis), which can cause heart attacks and strokes.  What are the causes?  This condition is usually caused by a natural change in hormone levels that happens as you get older. The condition may also be caused by surgery to remove both ovaries (bilateral oophorectomy).  What increases the risk?  This condition is more likely to start at an earlier age if you have certain medical conditions or treatments, including:  · A tumor of the pituitary gland in the brain.  · A disease that affects the ovaries and hormone production.  · Radiation treatment for cancer.  · Certain cancer treatments, such as chemotherapy or hormone (anti-estrogen) therapy.  · Heavy smoking and excessive alcohol use.  · Family history of early menopause.  This condition is also more likely to develop earlier in women who are very thin.  What are the signs or symptoms?  Symptoms of this condition include:  · Hot flashes.  · Irregular menstrual periods.  · Night sweats.  · Changes in feelings about sex. This could be a decrease in sex drive or an increased comfort around your sexuality.  · Vaginal dryness and thinning of the vaginal walls. This may cause painful intercourse.  · Dryness of the skin and development of wrinkles.  · Headaches.  · Problems sleeping (insomnia).  · Mood swings or irritability.  · Memory problems.  · Weight gain.  · Hair growth on the face and chest.  · Bladder infections or problems with urinating.  How  is this diagnosed?  This condition is diagnosed based on your medical history, a physical exam, your age, your menstrual history, and your symptoms. Hormone tests may also be done.  How is this treated?  In some cases, no treatment is needed. You and your health care provider should make a decision together about whether treatment is necessary. Treatment will be based on your individual condition and preferences. Treatment for this condition focuses on managing symptoms. Treatment may include:  · Menopausal hormone therapy (MHT).  · Medicines to treat specific symptoms or complications.  · Acupuncture.  · Vitamin or herbal supplements.  Before starting treatment, make sure to let your health care provider know if you have a personal or family history of:  · Heart disease.  · Breast cancer.  · Blood clots.  · Diabetes.  · Osteoporosis.  Follow these instructions at home:  Lifestyle  · Do not use any products that contain nicotine or tobacco, such as cigarettes and e-cigarettes. If you need help quitting, ask your health care provider.  · Get at least 30 minutes of physical activity on 5 or more days each week.  · Avoid alcoholic and caffeinated beverages, as well as spicy foods. This may help prevent hot flashes.  · Get 7-8 hours of sleep each night.  · If you have hot flashes, try:  ? Dressing in layers.  ? Avoiding things that may trigger hot flashes, such as spicy food, warm places, or stress.  ? Taking slow, deep breaths when a hot flash starts.  ? Keeping a fan in your home and office.  · Find ways to manage stress, such as deep breathing, meditation, or journaling.  · Consider going to group therapy with other women who are having menopause symptoms. Ask your health care provider about recommended group therapy meetings.  Eating and drinking  · Eat a healthy, balanced diet that contains whole grains, lean protein, low-fat dairy, and plenty of fruits and vegetables.  · Your health care provider may recommend  adding more soy to your diet. Foods that contain soy include tofu, tempeh, and soy milk.  · Eat plenty of foods that contain calcium and vitamin D for bone health. Items that are rich in calcium include low-fat milk, yogurt, beans, almonds, sardines, broccoli, and kale.  Medicines  · Take over-the-counter and prescription medicines only as told by your health care provider.  · Talk with your health care provider before starting any herbal supplements. If prescribed, take vitamins and supplements as told by your health care provider. These may include:  ? Calcium. Women age 51 and older should get 1,200 mg (milligrams) of calcium every day.  ? Vitamin D. Women need 600-800 International Units of vitamin D each day.  ? Vitamins B12 and B6. Aim for 50 micrograms of B12 and 1.5 mg of B6 each day.  General instructions  · Keep track of your menstrual periods, including:  ? When they occur.  ? How heavy they are and how long they last.  ? How much time passes between periods.  · Keep track of your symptoms, noting when they start, how often you have them, and how long they last.  · Use vaginal lubricants or moisturizers to help with vaginal dryness and improve comfort during sex.  · Keep all follow-up visits as told by your health care provider. This is important. This includes any group therapy or counseling.  Contact a health care provider if:  · You are still having menstrual periods after age 55.  · You have pain during sex.  · You have not had a period for 12 months and you develop vaginal bleeding.  Get help right away if:  · You have:  ? Severe depression.  ? Excessive vaginal bleeding.  ? Pain when you urinate.  ? A fast or irregular heart beat (palpitations).  ? Severe headaches.  ? Abdomen (abdominal) pain or severe indigestion.  · You fell and you think you have a broken bone.  · You develop leg or chest pain.  · You develop vision problems.  · You feel a lump in your breast.  Summary  · Menopause is the normal  time of life when menstrual periods stop completely. It is usually confirmed by 12 months without a menstrual period.  · The transition to menopause (perimenopause) most often happens between the ages of 45 and 55.  · Symptoms can be managed through medicines, lifestyle changes, and complementary therapies such as acupuncture.  · Eat a balanced diet that is rich in nutrients to promote bone health and heart health and to manage symptoms during menopause.  This information is not intended to replace advice given to you by your health care provider. Make sure you discuss any questions you have with your health care provider.  Document Released: 03/09/2005 Document Revised: 11/30/2018 Document Reviewed: 01/20/2018  [employer] Patient Education © 2020 [employer]    Hypotension  As your heart beats, it forces blood through your body. This force is called blood pressure. If you have hypotension, you have low blood pressure. When your blood pressure is too low, you may not get enough blood to your brain or other parts of your body. This may cause you to feel weak, light-headed, have a fast heartbeat, or even pass out (faint). Low blood pressure may be harmless, or it may cause serious problems.  What are the causes?  · Blood loss.  · Not enough water in the body (dehydration).  · Heart problems.  · Hormone problems.  · Pregnancy.  · A very bad infection.  · Not having enough of certain nutrients.  · Very bad allergic reactions.  · Certain medicines.  What increases the risk?  · Age. The risk increases as you get older.  · Conditions that affect the heart or the brain and spinal cord (central nervous system).  · Taking certain medicines.  · Being pregnant.  What are the signs or symptoms?  · Feeling:  ? Weak.  ? Light-headed.  ? Dizzy.  ? Tired (fatigued).  · Blurred vision.  · Fast heartbeat.  · Passing out, in very bad cases.  How is this treated?  · Changing your diet. This may involve eating more salt (sodium) or  drinking more water.  · Taking medicines to raise your blood pressure.  · Changing how much you take (the dosage) of some of your medicines.  · Wearing compression stockings. These stockings help to prevent blood clots and reduce swelling in your legs.  In some cases, you may need to go to the hospital for:  · Fluid replacement. This means you will receive fluids through an IV tube.  · Blood replacement. This means you will receive donated blood through an IV tube (transfusion).  · Treating an infection or heart problems, if this applies.  · Monitoring. You may need to be monitored while medicines that you are taking wear off.  Follow these instructions at home:  Eating and drinking    · Drink enough fluids to keep your pee (urine) pale yellow.  · Eat a healthy diet. Follow instructions from your doctor about what you can eat or drink. A healthy diet includes:  ? Fresh fruits and vegetables.  ? Whole grains.  ? Low-fat (lean) meats.  ? Low-fat dairy products.  · Eat extra salt only as told. Do not add extra salt to your diet unless your doctor tells you to.  · Eat small meals often.  · Avoid standing up quickly after you eat.  Medicines  · Take over-the-counter and prescription medicines only as told by your doctor.  ? Follow instructions from your doctor about changing how much you take of your medicines, if this applies.  ? Do not stop or change any of your medicines on your own.  General instructions    · Wear compression stockings as told by your doctor.  · Get up slowly from lying down or sitting.  · Avoid hot showers and a lot of heat as told by your doctor.  · Return to your normal activities as told by your doctor. Ask what activities are safe for you.  · Do not use any products that contain nicotine or tobacco, such as cigarettes, e-cigarettes, and chewing tobacco. If you need help quitting, ask your doctor.  · Keep all follow-up visits as told by your doctor. This is important.  Contact a doctor if:  · You  throw up (vomit).  · You have watery poop (diarrhea).  · You have a fever for more than 2-3 days.  · You feel more thirsty than normal.  · You feel weak and tired.  Get help right away if:  · You have chest pain.  · You have a fast or uneven heartbeat.  · You lose feeling (have numbness) in any part of your body.  · You cannot move your arms or your legs.  · You have trouble talking.  · You get sweaty or feel light-headed.  · You pass out.  · You have trouble breathing.  · You have trouble staying awake.  · You feel mixed up (confused).  Summary  · Hypotension is also called low blood pressure. It is when the force of blood pumping through your arteries is too weak.  · Hypotension may be harmless, or it may cause serious problems.  · Treatment may include changing your diet and medicines, and wearing compression stockings.  · In very bad cases, you may need to go to the hospital.  This information is not intended to replace advice given to you by your health care provider. Make sure you discuss any questions you have with your health care provider.  Document Released: 03/14/2011 Document Revised: 06/13/2019 Document Reviewed: 06/13/2019  [employer] Patient Education © 2020 [employer]

## 2021-03-17 NOTE — PROGRESS NOTES
Subjective:     Chief Complaint   Patient presents with   • Dizziness     since last month and headaches   • Requesting Labs     hormones   • Insomnia       Mikal Murillo is a 50 y.o. female here today for evaluation and management of:    Dizziness  Complains of occasional dizziness described as a lightheaded sensation  Occurs with  rising from a supine position  Denies otalgia, otorrhea, tinnitus, hearing loss   Patient does have lower blood pressure.  She is having some difficulty with distance vision and has not had her eyes checked in many years.    Menopause  Patient continues to have problems with hot flashes.  We did do a trial of hormone therapy but patient did not like the weight gain.  She did  some Estroven but has not started it.       Allergies   Allergen Reactions   • Pcn [Penicillins] Hives       Current medicines (including changes today)  Current Outpatient Medications   Medication Sig Dispense Refill   • levothyroxine (SYNTHROID) 50 MCG Tab Take 1 Tab by mouth Every morning on an empty stomach. 30 Tab 3   • clotrimazole-betamethasone (LOTRISONE) 1-0.05 % Cream Apply to rash BID until clear 60 g 2   • triamcinolone acetonide (KENALOG) 0.1 % Cream Apply thin layer to rash 2 times daily as directed. 424 g 0     No current facility-administered medications for this visit.       She  has a past medical history of Hypothyroid and Perimenopausal vasomotor symptoms (5/6/2014).    Patient Active Problem List    Diagnosis Date Noted   • Menopause 03/08/2021   • Dyslipidemia 09/09/2020   • Rectal bleeding 08/26/2020   • Tinea corporis 03/09/2020   • Pilonidal cyst 03/09/2020   • Calculus of gallbladder without cholecystitis without obstruction 08/22/2019   • Gastroesophageal reflux disease without esophagitis 08/06/2019   • Dysuria 04/25/2019   • Changing pigmented skin lesion 04/25/2019   • Other chest pain 02/20/2019   • Dizziness 02/20/2019   • Blood in stool 02/20/2019   • Acquired hypothyroidism  "07/10/2018   • Lateral epicondylitis of right elbow 05/30/2018   • Anxiety disorder 02/14/2018   • Neck pain, acute 01/03/2018   • Herpes simplex infection 06/01/2017   • Inguinal lymphadenopathy 06/01/2017   • Hypoglycemia 12/05/2016   • Other neutropenia (HCC) 12/05/2016   • Vitamin D deficiency 08/17/2016   • Intrinsic eczema 07/29/2016   • Allergic rhinitis due to pollen 07/29/2016   • History of thyroid disease 10/14/2015   • Perimenopausal vasomotor symptoms 05/06/2014       ROS   No fever or chills.  No nausea or vomiting.  No chest pain or palpitations.  No cough or SOB.  No pain with urination or hematuria.  No black or bloody stools.       Objective:     BP (!) 84/50 (BP Location: Right arm, Patient Position: Sitting, BP Cuff Size: Adult)   Pulse 65   Temp 36.3 °C (97.4 °F) (Temporal)   Resp 16   Ht 1.626 m (5' 4\")   Wt 48.1 kg (106 lb)   SpO2 97%  Body mass index is 18.19 kg/m².   Physical Exam:  Well developed, well nourished.  Alert, oriented in no acute distress.  Eye contact is good, speech goal directed, affect calm  Eyes: conjunctiva non-injected, sclera non-icteric.  PERRL.  EOMs intact.  No lateral nystagmus  Ears: Pinna normal. TM pearly gray.   Neck Supple.  No adenopathy or masses in the neck or supraclavicular regions. No thyromegaly  Lungs: clear to auscultation bilaterally with good excursion. No wheezes or rhonchi  CV: regular rate and rhythm. No murmur        Assessment and Plan:   The following treatment plan was discussed    1. Screening for colorectal cancer  Cologuard ordered  - COLOGUARD (FIT DNA)    2. Visual changes  Refer to ophthalmology.  Discussed that this may very contributing to her sense of dizziness  - REFERRAL TO OPHTHALMOLOGY    3. Dizziness  Discussed this may be blood pressure related in addition to the visual changes.  Discussed increasing sodium in the well-hydrated  - REFERRAL TO OPHTHALMOLOGY    4. Menopause  Trial of lower dose of the Estroven    Any change " or worsening of signs or symptoms, patient encouraged to follow-up or report to the emergency room for further evaluation. Patient understands and agrees.    Followup: Return in about 6 months (around 9/8/2021).

## 2021-03-17 NOTE — ASSESSMENT & PLAN NOTE
Complains of occasional dizziness described as a lightheaded sensation  Occurs with  rising from a supine position  Denies otalgia, otorrhea, tinnitus, hearing loss   Patient does have lower blood pressure.  She is having some difficulty with distance vision and has not had her eyes checked in many years.

## 2021-03-17 NOTE — ASSESSMENT & PLAN NOTE
Patient continues to have problems with hot flashes.  We did do a trial of hormone therapy but patient did not like the weight gain.  She did  some Estroven but has not started it.

## 2021-03-29 ENCOUNTER — TELEMEDICINE (OUTPATIENT)
Dept: MEDICAL GROUP | Facility: LAB | Age: 51
End: 2021-03-29
Payer: COMMERCIAL

## 2021-03-29 VITALS — WEIGHT: 106 LBS | HEIGHT: 64 IN | BODY MASS INDEX: 18.1 KG/M2

## 2021-03-29 DIAGNOSIS — B00.9 HERPES SIMPLEX INFECTION: ICD-10-CM

## 2021-03-29 DIAGNOSIS — R21 RASH: ICD-10-CM

## 2021-03-29 DIAGNOSIS — K21.9 GASTROESOPHAGEAL REFLUX DISEASE WITHOUT ESOPHAGITIS: ICD-10-CM

## 2021-03-29 PROCEDURE — 99213 OFFICE O/P EST LOW 20 MIN: CPT | Mod: 95,CR | Performed by: FAMILY MEDICINE

## 2021-03-29 RX ORDER — VALACYCLOVIR HYDROCHLORIDE 1 G/1
1000 TABLET, FILM COATED ORAL 2 TIMES DAILY
Qty: 20 TABLET | Refills: 3 | Status: SHIPPED | OUTPATIENT
Start: 2021-03-29 | End: 2021-04-03

## 2021-03-29 NOTE — PATIENT INSTRUCTIONS
Upper Endoscopy, Adult  Upper endoscopy is a procedure to look inside the upper GI (gastrointestinal) tract. The upper GI tract is made up of:  · The part of the body that moves food from your mouth to your stomach (esophagus).  · The stomach.  · The first part of your small intestine (duodenum).  This procedure is also called esophagogastroduodenoscopy (EGD) or gastroscopy. In this procedure, your health care provider passes a thin, flexible tube (endoscope) through your mouth and down your esophagus into your stomach. A small camera is attached to the end of the tube. Images from the camera appear on a monitor in the exam room. During this procedure, your health care provider may also remove a small piece of tissue to be sent to a lab and examined under a microscope (biopsy).  Your health care provider may do an upper endoscopy to diagnose cancers of the upper GI tract. You may also have this procedure to find the cause of other conditions, such as:  · Stomach pain.  · Heartburn.  · Pain or problems when swallowing.  · Nausea and vomiting.  · Stomach bleeding.  · Stomach ulcers.  Tell a health care provider about:  · Any allergies you have.  · All medicines you are taking, including vitamins, herbs, eye drops, creams, and over-the-counter medicines.  · Any problems you or family members have had with anesthetic medicines.  · Any blood disorders you have.  · Any surgeries you have had.  · Any medical conditions you have.  · Whether you are pregnant or may be pregnant.  What are the risks?  Generally, this is a safe procedure. However, problems may occur, including:  · Infection.  · Bleeding.  · Allergic reactions to medicines.  · A tear or hole (perforation) in the esophagus, stomach, or duodenum.  What happens before the procedure?  Staying hydrated  Follow instructions from your health care provider about hydration, which may include:  · Up to 2 hours before the procedure - you may continue to drink clear  liquids, such as water, clear fruit juice, black coffee, and plain tea.    Eating and drinking restrictions  Follow instructions from your health care provider about eating and drinking, which may include:  · 8 hours before the procedure - stop eating heavy meals or foods, such as meat, fried foods, or fatty foods.  · 6 hours before the procedure - stop eating light meals or foods, such as toast or cereal.  · 6 hours before the procedure - stop drinking milk or drinks that contain milk.  · 2 hours before the procedure - stop drinking clear liquids.  Medicines  Ask your health care provider about:  · Changing or stopping your regular medicines. This is especially important if you are taking diabetes medicines or blood thinners.  · Taking medicines such as aspirin and ibuprofen. These medicines can thin your blood. Do not take these medicines unless your health care provider tells you to take them.  · Taking over-the-counter medicines, vitamins, herbs, and supplements.  General instructions  · Plan to have someone take you home from the hospital or clinic.  · If you will be going home right after the procedure, plan to have someone with you for 24 hours.  · Ask your health care provider what steps will be taken to help prevent infection.  What happens during the procedure?    · An IV will be inserted into one of your veins.  · You may be given one or more of the following:  ? A medicine to help you relax (sedative).  ? A medicine to numb the throat (local anesthetic).  · You will lie on your left side on an exam table.  · Your health care provider will pass the endoscope through your mouth and down your esophagus.  · Your health care provider will use the scope to check the inside of your esophagus, stomach, and duodenum. Biopsies may be taken.  · The endoscope will be removed.  The procedure may vary among health care providers and hospitals.  What happens after the procedure?  · Your blood pressure, heart rate,  breathing rate, and blood oxygen level will be monitored until you leave the hospital or clinic.  · Do not drive for 24 hours if you were given a sedative during your procedure.  · When your throat is no longer numb, you may be given some fluids to drink.  · It is up to you to get the results of your procedure. Ask your health care provider, or the department that is doing the procedure, when your results will be ready.  Summary  · Upper endoscopy is a procedure to look inside the upper GI tract.  · During the procedure, an IV will be inserted into one of your veins. You may be given a medicine to help you relax.  · A medicine will be used to numb your throat.  · The endoscope will be passed through your mouth and down your esophagus.  This information is not intended to replace advice given to you by your health care provider. Make sure you discuss any questions you have with your health care provider.  Document Released: 12/15/2001 Document Revised: 06/12/2019 Document Reviewed: 05/20/2019  [employer] Patient Education © 2020 [employer]

## 2021-04-06 NOTE — ASSESSMENT & PLAN NOTE
Patient complains of persistent reflux symptoms.  She gets burning up her esophagus.  She is not taking anything for this.  Denies early satiety, unintentional weight loss, choking, persistent burning pain in chest or upper abdomen.

## 2021-04-06 NOTE — ASSESSMENT & PLAN NOTE
Patient gets recurrent outbreaks of herpes simplex at the anterior end of the gluteal fold.  The Valtrex works well for this.  She is concerned that she had another outbreak with a new rash on inner left thigh.  It is different than her previous HSV rashes.

## 2021-04-06 NOTE — PROGRESS NOTES
Virtual Visit: Established Patient   This visit was conducted via Zoom using secure and encrypted videoconferencing technology. The patient was in a private location in the state of [place].    The patient's identity was confirmed and verbal consent was obtained for this virtual visit.    Subjective:   CC: No chief complaint on file.      Mikal Murillo is a 50 y.o. female presenting for evaluation and management of:    Herpes simplex infection  Patient gets recurrent outbreaks of herpes simplex at the anterior end of the gluteal fold.  The Valtrex works well for this.  She is concerned that she had another outbreak with a new rash on inner left thigh.  It is different than her previous HSV rashes.    Gastroesophageal reflux disease without esophagitis  Patient complains of persistent reflux symptoms.  She gets burning up her esophagus.  She is not taking anything for this.  Denies early satiety, unintentional weight loss, choking, persistent burning pain in chest or upper abdomen.          ROS   Denies any recent fevers or chills. No nausea or vomiting. No chest pains or shortness of breath.     Allergies   Allergen Reactions   • Pcn [Penicillins] Hives       Current medicines (including changes today)  Current Outpatient Medications   Medication Sig Dispense Refill   • levothyroxine (SYNTHROID) 50 MCG Tab Take 1 tablet by mouth Every morning on an empty stomach. 30 tablet 6   • clotrimazole-betamethasone (LOTRISONE) 1-0.05 % Cream Apply to rash BID until clear 60 g 2   • triamcinolone acetonide (KENALOG) 0.1 % Cream Apply thin layer to rash 2 times daily as directed. 424 g 0     No current facility-administered medications for this visit.       Patient Active Problem List    Diagnosis Date Noted   • Menopause 03/08/2021   • Dyslipidemia 09/09/2020   • Rectal bleeding 08/26/2020   • Tinea corporis 03/09/2020   • Pilonidal cyst 03/09/2020   • Calculus of gallbladder without cholecystitis without obstruction 08/22/2019  "  • Gastroesophageal reflux disease without esophagitis 08/06/2019   • Dysuria 04/25/2019   • Changing pigmented skin lesion 04/25/2019   • Other chest pain 02/20/2019   • Dizziness 02/20/2019   • Blood in stool 02/20/2019   • Acquired hypothyroidism 07/10/2018   • Lateral epicondylitis of right elbow 05/30/2018   • Anxiety disorder 02/14/2018   • Neck pain, acute 01/03/2018   • Herpes simplex infection 06/01/2017   • Inguinal lymphadenopathy 06/01/2017   • Hypoglycemia 12/05/2016   • Other neutropenia (HCC) 12/05/2016   • Vitamin D deficiency 08/17/2016   • Intrinsic eczema 07/29/2016   • Allergic rhinitis due to pollen 07/29/2016   • History of thyroid disease 10/14/2015   • Perimenopausal vasomotor symptoms 05/06/2014       Family History   Problem Relation Age of Onset   • Dementia Mother    • Hypertension Mother    • Diabetes Mother    • Heart Disease Father    • Diabetes Father    • Thyroid Sister    • Cancer Brother         half brother, prostate   • Hyperlipidemia Brother         half brother   • Stroke Neg Hx    • Psychiatric Illness Neg Hx        She  has a past medical history of Hypothyroid and Perimenopausal vasomotor symptoms (5/6/2014).  She  has no past surgical history on file.       Objective:   Ht 1.626 m (5' 4\")   Wt 48.1 kg (106 lb)   LMP 04/23/2017   BMI 18.19 kg/m²     Physical Exam:  Constitutional: Alert, no distress, well-groomed.  Skin: No rashes in visible areas.  Eye: Round. Conjunctiva clear, lids normal. No icterus.   ENMT: Lips pink without lesions, good dentition, moist mucous membranes. Phonation normal.  Neck: No masses, no thyromegaly. Moves freely without pain.  Respiratory: Unlabored respiratory effort, no cough or audible wheeze  Psych: Alert and oriented x3, normal affect and mood.   Skin- erythematous rash on left inter thigh    Assessment and Plan:   The following treatment plan was discussed:     1. Herpes simplex infection  Refill Valtrex for as needed use  - " valacyclovir (VALTREX) 1 GM Tab; Take 1 tablet by mouth 2 times a day for 5 days.  Dispense: 20 tablet; Refill: 3    2. Rash  Use triamcinolone cream twice a day until clear.  Call if not improving    3. Gastroesophageal reflux disease without esophagitis  Commend starting omeprazole 20 mg daily.  Patient does not want to do this.  Given dietary information on preventing GERD symptoms.        Follow-up: Return if symptoms worsen or fail to improve.

## 2021-07-22 ENCOUNTER — HOSPITAL ENCOUNTER (OUTPATIENT)
Facility: MEDICAL CENTER | Age: 51
End: 2021-07-22
Attending: FAMILY MEDICINE

## 2021-07-22 ENCOUNTER — OFFICE VISIT (OUTPATIENT)
Dept: MEDICAL GROUP | Facility: LAB | Age: 51
End: 2021-07-22
Payer: COMMERCIAL

## 2021-07-22 VITALS
OXYGEN SATURATION: 96 % | HEIGHT: 64 IN | RESPIRATION RATE: 16 BRPM | WEIGHT: 112 LBS | DIASTOLIC BLOOD PRESSURE: 60 MMHG | HEART RATE: 65 BPM | SYSTOLIC BLOOD PRESSURE: 94 MMHG | TEMPERATURE: 98 F | BODY MASS INDEX: 19.12 KG/M2

## 2021-07-22 DIAGNOSIS — D70.8 OTHER NEUTROPENIA (HCC): ICD-10-CM

## 2021-07-22 DIAGNOSIS — R42 DIZZINESS: ICD-10-CM

## 2021-07-22 DIAGNOSIS — N76.0 ACUTE VAGINITIS: ICD-10-CM

## 2021-07-22 DIAGNOSIS — L20.84 INTRINSIC ECZEMA: ICD-10-CM

## 2021-07-22 DIAGNOSIS — E03.9 ACQUIRED HYPOTHYROIDISM: ICD-10-CM

## 2021-07-22 PROBLEM — L81.9 CHANGING PIGMENTED SKIN LESION: Status: RESOLVED | Noted: 2019-04-25 | Resolved: 2021-07-22

## 2021-07-22 PROBLEM — K62.5 RECTAL BLEEDING: Status: RESOLVED | Noted: 2020-08-26 | Resolved: 2021-07-22

## 2021-07-22 LAB
CANDIDA DNA VAG QL PROBE+SIG AMP: NEGATIVE
G VAGINALIS DNA VAG QL PROBE+SIG AMP: NEGATIVE
T VAGINALIS DNA VAG QL PROBE+SIG AMP: NEGATIVE

## 2021-07-22 PROCEDURE — 87480 CANDIDA DNA DIR PROBE: CPT

## 2021-07-22 PROCEDURE — 87510 GARDNER VAG DNA DIR PROBE: CPT

## 2021-07-22 PROCEDURE — 87660 TRICHOMONAS VAGIN DIR PROBE: CPT

## 2021-07-22 PROCEDURE — 99214 OFFICE O/P EST MOD 30 MIN: CPT | Performed by: FAMILY MEDICINE

## 2021-07-22 RX ORDER — THYROID 15 MG/1
15 TABLET ORAL DAILY
Qty: 30 TABLET | Refills: 3 | Status: SHIPPED | OUTPATIENT
Start: 2021-07-22 | End: 2022-06-13

## 2021-07-22 NOTE — PATIENT INSTRUCTIONS
Desiccated Thyroid oral tablets and capsules  What is this medicine?  LEVOTHYROXINE (tay voe thye EDELMIRA een) and LIOTHYRONINE (lye oh THYE max neen) Porcine, also known as Dessicated Thyroid, is a combination of 2 thyroid hormones. This medicine can improve symptoms of thyroid deficiency such as slow speech, lack of energy, weight gain, hair loss, dry skin, and feeling cold.  This medicine may be used for other purposes; ask your health care provider or pharmacist if you have questions.  COMMON BRAND NAME(S): [place] Thyroid, Bio-Throid, Nature Thyroid, Nature-Throid, NP Thyroid, Westhroid, Westhroid-P, WP Thyroid  What should I tell my health care provider before I take this medicine?  They need to know if you have any of these conditions:  · Copiah's disease or other adrenal gland problem  · angina  · bone problems  · diabetes  · dieting or on a weight loss program  · fertility problems  · heart disease  · pituitary gland problem  · take medicines that treat or prevent blood clots  · an unusual or allergic reaction to levothyroxine, thyroid hormones, other medicines, foods, dyes, or preservatives  · pregnant or trying to get pregnant  · breast-feeding  How should I use this medicine?  Take this medicine by mouth with water. It is best to take it on an empty stomach, at least 30 minutes before or 2 hours after food. Take this medicine at the same time each day. Follow the directions on the prescription label. Do not take your medication more often than directed.  Talk to your pediatrician regarding the use of this medicine in children. While this drug may be prescribed for children, precautions do apply.  Overdosage: If you think you have taken too much of this medicine contact a poison control center or emergency room at once.  NOTE: This medicine is only for you. Do not share this medicine with others.  What if I miss a dose?  If you miss a dose, take it as soon as you can. If it is almost time for your next dose,  take only that dose. Do not take double or extra doses.  What may interact with this medicine?  · amiodarone  · antacids  · anti-thyroid medicines  · calcium supplements  · carbamazepine  · certain medicines for depression  · certain medicines to treat cancer  · cholestyramine  · clofibrate  · colesevelam  · colestipol  · digoxin  · female hormones, like estrogens or progestins and birth control pills, patches, rings, or injections  · iron supplements  · kayexylate  · ketamine  · liquid nutrition products like Ensure  · lithium  · medicines for colds and breathing difficulties  · medicines for diabetes  · medicines or dietary supplements for weight loss  · methadone  · niacin  · orlistat  · oxandrolone  · phenobarbital or other barbiturates  · phenytoin  · rifampin  · sevelamer  · simethicone  · soy isoflavones  · steroid medicines like prednisone or cortisone  · sucralfate  · testosterone  · theophylline  · warfarin  This list may not describe all possible interactions. Give your health care provider a list of all the medicines, herbs, non-prescription drugs, or dietary supplements you use. Also tell them if you smoke, drink alcohol, or use illegal drugs. Some items may interact with your medicine.  What should I watch for while using this medicine?  Do not switch brands of this medicine unless your health care professional agrees with the change. Ask questions if you are uncertain.  You will need regular exams and occasional blood tests to check the response to treatment. If you are receiving this medicine for an underactive thyroid, it may be several weeks before you notice an improvement. Check with your doctor or health care professional if your symptoms do not improve.  It may be necessary for you to take this medicine for the rest of your life. Do not stop using this medicine unless your doctor or health care professional advises you to.  This medicine can affect blood sugar levels. If you have diabetes,  check your blood sugar as directed.  Some brands of this medicine may have a strong odor. This does not mean that the drug is spoiled.  You may lose some of your hair when you first start treatment. With time, this usually corrects itself.  If you are going to have surgery, tell your doctor or health care professional that you are taking this medicine.  What side effects may I notice from receiving this medicine?  Side effects that you should report to your doctor or health care professional as soon as possible:  · allergic reactions like skin rash, itching or hives, swelling of the face, lips, or tongue  · anxious  · breathing problems  · changes in menstrual periods  · chest pain  · diarrhea  · excessive sweating or intolerance to heat  · fast or irregular heartbeat  · leg cramps  · nervousness  · swelling of ankles, feet, or legs  · tremors  · trouble sleeping  · vomiting  Side effects that usually do not require medical attention (report to your doctor or health care professional if they continue or are bothersome):  · changes in appetite  · headache  · irritable  · nausea  · weight loss  This list may not describe all possible side effects. Call your doctor for medical advice about side effects. You may report side effects to FDA at 4-865-FDA-3774.  Where should I keep my medicine?  Keep out of the reach of children.  Store at room temperature between 15 and 30 degrees C (59 and 86 degrees F). Protect from light and moisture. Keep container tightly closed. Throw away any unused medicine after the expiration date.  NOTE: This sheet is a summary. It may not cover all possible information. If you have questions about this medicine, talk to your doctor, pharmacist, or health care provider.  © 2020 [employer] (2018-01-29 15:37:18)

## 2021-07-22 NOTE — ASSESSMENT & PLAN NOTE
She had a significant work-up while in [place].  Her labs showed the followin2021  T3 1,54 1.46  T4 94.35 94.76  FT3 3,97 5.18  FT4 14,88 12.5  TSH 2.63 5.167  Antith 14.4  TG   14.3  TPO 7.95 0.36  Was only taking 1/4 of levothyroxine 50 mcg not the full dose as directed.  She is complaining of significant fatigue and some weight gain.

## 2021-07-29 NOTE — PROGRESS NOTES
Subjective:     Chief Complaint   Patient presents with   • Requesting Labs     thyroid   • Fatigue   • Rash     right hand        Mikal Murillo is a 51 y.o. female here today for evaluation and management of:    Acquired hypothyroidism   She had a significant work-up while in [place].  Her labs showed the followin2021  T3 1,54 1.46  T4 94.35 94.76  FT3 3,97 5.18  FT4 14,88 12.5  TSH 2.63 5.167  Antith 14.4  TG   14.3  TPO 7.95 0.36  Was only taking 1/4 of levothyroxine 50 mcg not the full dose as directed.  She is complaining of significant fatigue and some weight gain.    Acute vaginitis   was just diagnosed with a fungal infection on his penis so she would like to be checked for yeast.  She reports a slight discharge.  But no pelvic pain.    Dizziness  Complains of occasional dizziness described as a lightheaded sensation  Occurs with  rising from a supine position  Denies otalgia, otorrhea, tinnitus, hearing loss   Patient does have lower blood pressure.  Patient was recently hospitalized in [place] for 7 days due to extensive testing including multiple scans of all of her body and labs.    Other neutropenia (HCC)  Her recent white blood cell count when she was in [place] was 3.84 with 46% leukocytes and 44% lymphocytes.  She is not having any recurrent infections.    Intrinsic eczema  Patient is requesting a refill of her triamcinolone as she has had an increased rash on her buttocks.  This is chronic in nature and this cream has worked well in the past.       Allergies   Allergen Reactions   • Pcn [Penicillins] Hives       Current medicines (including changes today)  Current Outpatient Medications   Medication Sig Dispense Refill   • thyroid (ARMOUR THYROID) 15 MG Tab Take 1 tablet by mouth every day. 30 tablet 3   • clotrimazole-betamethasone (LOTRISONE) 1-0.05 % Cream Apply to rash BID until clear 60 g 2   • triamcinolone acetonide (KENALOG) 0.1 % Cream Apply thin layer to rash 2 times  "daily as directed. 424 g 0     No current facility-administered medications for this visit.       She  has a past medical history of Hypothyroid and Perimenopausal vasomotor symptoms (5/6/2014).    Patient Active Problem List    Diagnosis Date Noted   • Acute vaginitis 07/22/2021   • Menopause 03/08/2021   • Dyslipidemia 09/09/2020   • Tinea corporis 03/09/2020   • Pilonidal cyst 03/09/2020   • Calculus of gallbladder without cholecystitis without obstruction 08/22/2019   • Gastroesophageal reflux disease without esophagitis 08/06/2019   • Dysuria 04/25/2019   • Other chest pain 02/20/2019   • Dizziness 02/20/2019   • Blood in stool 02/20/2019   • Acquired hypothyroidism 07/10/2018   • Lateral epicondylitis of right elbow 05/30/2018   • Anxiety disorder 02/14/2018   • Neck pain, acute 01/03/2018   • Herpes simplex infection 06/01/2017   • Inguinal lymphadenopathy 06/01/2017   • Hypoglycemia 12/05/2016   • Other neutropenia (HCC) 12/05/2016   • Vitamin D deficiency 08/17/2016   • Intrinsic eczema 07/29/2016   • Allergic rhinitis due to pollen 07/29/2016   • History of thyroid disease 10/14/2015   • Perimenopausal vasomotor symptoms 05/06/2014       ROS   No fever or chills.  No nausea or vomiting.  No chest pain or palpitations.  No cough or SOB.  No pain with urination or hematuria.  No black or bloody stools.       Objective:     BP (!) 94/60 (BP Location: Right arm, Patient Position: Sitting, BP Cuff Size: Adult)   Pulse 65   Temp 36.7 °C (98 °F) (Temporal)   Resp 16   Ht 1.626 m (5' 4\")   Wt 50.8 kg (112 lb)   SpO2 96%  Body mass index is 19.22 kg/m².   Physical Exam:  Well developed, well nourished.  Alert, oriented in no acute distress.  Eye contact is good, speech goal directed, affect calm  Eyes: conjunctiva non-injected, sclera non-icteric.  Ears: Pinna normal. TM pearly gray.   Neck Supple.  No adenopathy or masses in the neck or supraclavicular regions. No thyromegaly  Lungs: clear to auscultation " bilaterally with good excursion. No wheezes or rhonchi  CV: regular rate and rhythm. No murmur  Abdomen: soft, nontender, no masses or organomegaly.  No rebound or gaurding  Ext: no edema, color normal, vascularity normal, temperature normal  Skin: Erythematous, scaling patch lesion on the left side of the buttocks and right dorsum of the hand        Assessment and Plan:   The following treatment plan was discussed    1. Acquired hypothyroidism  Discussed the importance of treating her hypothyroidism for her to feel better.  She has not been tolerating the levothyroxine so we will try Bison Thyroid to see if she feels better with that.  Discussed that this could help her fatigue, dizziness and weight gain.  We will recheck labs in 3 months.  - TSH; Future  - FREE THYROXINE; Future  - thyroid (ARMOUR THYROID) 15 MG Tab; Take 1 tablet by mouth every day.  Dispense: 30 tablet; Refill: 3  - TRIIDOTHYRONINE; Future    2. Dizziness  Discussed the importance of treating her hypothyroidism for her to feel better.  She has not been tolerating the levothyroxine so we will try Bison Thyroid to see if she feels better with that.  Discussed that this could help her fatigue, dizziness and weight gain.  We will recheck labs in 3 months.    3. Other neutropenia (HCC)  This is a chronic medical condition that is currently stable  Asymptomatic.  We will continue to monitor    4. Intrinsic eczema  Moisturize regularly.  Triamcinolone twice a day until clear    5. Acute vaginitis  Affirm pending.  Await results  - VAGINAL PATHOGENS DNA PANEL; Future    Any change or worsening of signs or symptoms, patient encouraged to follow-up or report to the emergency room for further evaluation. Patient understands and agrees.    Followup: Return in about 3 months (around 10/22/2021).

## 2021-07-29 NOTE — ASSESSMENT & PLAN NOTE
Patient is requesting a refill of her triamcinolone as she has had an increased rash on her buttocks.  This is chronic in nature and this cream has worked well in the past.

## 2021-07-29 NOTE — ASSESSMENT & PLAN NOTE
Her recent white blood cell count when she was in [place] was 3.84 with 46% leukocytes and 44% lymphocytes.  She is not having any recurrent infections.

## 2021-07-29 NOTE — ASSESSMENT & PLAN NOTE
was just diagnosed with a fungal infection on his penis so she would like to be checked for yeast.  She reports a slight discharge.  But no pelvic pain.

## 2021-07-29 NOTE — ASSESSMENT & PLAN NOTE
Complains of occasional dizziness described as a lightheaded sensation  Occurs with  rising from a supine position  Denies otalgia, otorrhea, tinnitus, hearing loss   Patient does have lower blood pressure.  Patient was recently hospitalized in [place] for 7 days due to extensive testing including multiple scans of all of her body and labs.

## 2021-08-18 ENCOUNTER — PATIENT MESSAGE (OUTPATIENT)
Dept: MEDICAL GROUP | Facility: LAB | Age: 51
End: 2021-08-18

## 2021-08-19 ENCOUNTER — TELEPHONE (OUTPATIENT)
Dept: MEDICAL GROUP | Facility: LAB | Age: 51
End: 2021-08-19

## 2021-08-19 NOTE — TELEPHONE ENCOUNTER
----- Message from Nia Crawley, [facility] Ass't sent at 8/18/2021  3:06 PM PDT -----  Regarding: FW: Same question about a month ago about childish behavior of [facility]    ----- Message -----  From: Mikal Murillo  Sent: 8/18/2021   3:04 PM PDT  To: Adelfo Talbert Ma  Subject: Same question about a month ago about childi#    To  of [facility] pertaining to my physician Maria Alejandra Orozco.... We make an appointment to see Dr. Orozco on or about (within a week before) July 22, your  told us that [facility] and [facility] had reached an agreement, after a period of interruption,  so that our visit with Dr. Orozco was scheduled.  However, Within a few days, someone at schedule department seemed to be uncertain about the relation [facility] !?! Would [facility] please clarify and affirm why your left hand does not know what happened to your right hand?  Or that [facility] [employer] should know how we, THE PATIENTS feel that [facility] and [facility] are continuing to act like children, for profits above all else.  Your clarification would be appreciated. Thank you.

## 2021-08-19 NOTE — TELEPHONE ENCOUNTER
Called the  of patient who created the message below. He let me know he wanted to confirm we are not taking his and his wife's HPN plan any longer. I was able to confirm for him we are now Out of Network. He was disappointed since his wife has been a patient of Dr Orozco for many years and was hoping for [facility] to work something out with his health plan in the future. Patient advised.

## 2021-11-08 ENCOUNTER — APPOINTMENT (OUTPATIENT)
Dept: RADIOLOGY | Facility: MEDICAL CENTER | Age: 51
End: 2021-11-08
Attending: FAMILY MEDICINE
Payer: COMMERCIAL

## 2021-11-08 DIAGNOSIS — Z12.31 VISIT FOR SCREENING MAMMOGRAM: ICD-10-CM

## 2022-01-06 ENCOUNTER — HOSPITAL ENCOUNTER (OUTPATIENT)
Dept: RADIOLOGY | Facility: MEDICAL CENTER | Age: 52
End: 2022-01-06
Attending: FAMILY MEDICINE
Payer: COMMERCIAL

## 2022-01-06 DIAGNOSIS — Z12.31 VISIT FOR SCREENING MAMMOGRAM: ICD-10-CM

## 2022-01-06 PROCEDURE — 77063 BREAST TOMOSYNTHESIS BI: CPT

## 2022-03-16 ENCOUNTER — OFFICE VISIT (OUTPATIENT)
Dept: MEDICAL GROUP | Facility: LAB | Age: 52
End: 2022-03-16
Payer: COMMERCIAL

## 2022-03-16 VITALS
SYSTOLIC BLOOD PRESSURE: 90 MMHG | TEMPERATURE: 97 F | RESPIRATION RATE: 14 BRPM | HEART RATE: 64 BPM | WEIGHT: 107.4 LBS | BODY MASS INDEX: 18.34 KG/M2 | OXYGEN SATURATION: 98 % | DIASTOLIC BLOOD PRESSURE: 50 MMHG | HEIGHT: 64 IN

## 2022-03-16 DIAGNOSIS — E03.9 ACQUIRED HYPOTHYROIDISM: ICD-10-CM

## 2022-03-16 DIAGNOSIS — E55.9 VITAMIN D DEFICIENCY: ICD-10-CM

## 2022-03-16 DIAGNOSIS — K21.9 GASTROESOPHAGEAL REFLUX DISEASE WITHOUT ESOPHAGITIS: ICD-10-CM

## 2022-03-16 PROBLEM — M54.2 NECK PAIN, ACUTE: Status: RESOLVED | Noted: 2018-01-03 | Resolved: 2022-03-16

## 2022-03-16 PROBLEM — M77.11 LATERAL EPICONDYLITIS OF RIGHT ELBOW: Status: RESOLVED | Noted: 2018-05-30 | Resolved: 2022-03-16

## 2022-03-16 PROCEDURE — 99214 OFFICE O/P EST MOD 30 MIN: CPT | Performed by: NURSE PRACTITIONER

## 2022-03-16 RX ORDER — OMEPRAZOLE 40 MG/1
40 CAPSULE, DELAYED RELEASE ORAL 2 TIMES DAILY
Qty: 180 CAPSULE | Refills: 3 | Status: SHIPPED | OUTPATIENT
Start: 2022-03-16 | End: 2023-02-02

## 2022-03-16 NOTE — PROGRESS NOTES
"Subjective:     CC:   Chief Complaint   Patient presents with   • Requesting Labs       HPI:   Mikal presents today with the following:    Gastroesophageal reflux disease without esophagitis  Is a chronic condition, new to me.  Patient reports persistent reflux symptoms despite taking omeprazole 40 mg twice daily.  Patient was previously seen by gastroenterology and it was recommended she have endoscopy and colonoscopy, however patient did not follow-up for these procedures.  Patient does report that she has a lot of anxiety about her health overall, but did not like the risks associated with these procedures.  Negative H. pylori in 2019. She is interested in reestablishing with gastroenterology at this time for further evaluation.    ROS:   Gen: no fevers/chills, no changes in weight  Eyes: no changes in vision  ENT: no sore throat, no hearing loss, no bloody nose  Pulm: no sob, no cough  CV: no chest pain, no palpitations  GI: no nausea/vomiting, no diarrhea  : no dysuria  MSk: no myalgias  Skin: no rash  Neuro: no headaches, no numbness/tingling  Heme/Lymph: no easy bruising        - NOTE: All other systems reviewed and are negative, except as in HPI.    Objective:     Exam: BP (!) 90/50 (BP Location: Right arm, Patient Position: Sitting, BP Cuff Size: Adult)   Pulse 64   Temp 36.1 °C (97 °F)   Resp 14   Ht 1.626 m (5' 4\")   Wt 48.7 kg (107 lb 6.4 oz)   SpO2 98%  Body mass index is 18.44 kg/m².    Constitutional: Alert, no distress, well-groomed.  Skin: Warm, dry, good turgor, no rashes in visible areas.  Eye: Equal, round and reactive, conjunctiva clear, lids normal.  ENMT: Lips without lesions, good dentition, moist mucous membranes.  Neck: Trachea midline, no masses, no thyromegaly.  Respiratory: Unlabored respiratory effort, no cough. Clear to ausculation. No rales, ronchi, or wheezing.  Cardiovascular: Regular rate and rhythm without murmur. Carotid and radial pulses are intact and equal " bilaterally.  Abdomen: Soft, nondistended. Normal bowel sounds. Liver and spleen are not palpable. Tenderness to deep palpation in mid-epigastric area. No guarding, no rebound tenderness. Negative Warren's sign, Rovsing's sign, McBurney's sign. No CVAT.  MSK: Normal gait, moves all extremities.  Neuro: Grossly non-focal.   Psych: Alert and oriented x3, normal affect and mood.      Assessment & Plan:     51 y.o. female with the following -     1. Gastroesophageal reflux disease without esophagitis  Patient to continue medication as prescribed. Referral placed to gastroenterology. Continue to monitor.  - omeprazole (PRILOSEC) 40 MG delayed-release capsule; Take 1 Capsule by mouth 2 times a day.  Dispense: 180 Capsule; Refill: 3  - Referral to Gastroenterology    2. Vitamin D deficiency  Discussed vitamin D supplementation. Recommended taking an over-the-counter vitamin D supplement daily (800-1000 IU). Recheck labs.  - VITAMIN D,25 HYDROXY; Future    3. Acquired hypothyroidism  Continue thyroid medication.  Instructed patient to take on empty stomach with glass of water, 30 minutes prior to food or other medications.  Labs as indicated.  - CBC WITH DIFFERENTIAL; Future  - Comp Metabolic Panel; Future  - Lipid Profile; Future  - HEMOGLOBIN A1C; Future  - TSH WITH REFLEX TO FT4; Future

## 2022-03-17 NOTE — ASSESSMENT & PLAN NOTE
Is a chronic condition, new to me.  Patient reports persistent reflux symptoms despite taking omeprazole 40 mg twice daily.  Patient was previously seen by gastroenterology and it was recommended she have endoscopy and colonoscopy, however patient did not follow-up for these procedures.  Patient does report that she has a lot of anxiety about her health overall, but did not like the risks associated with these procedures.  Negative H. pylori in 2019. She is interested in reestablishing with gastroenterology at this time for further evaluation.

## 2022-04-01 ENCOUNTER — HOSPITAL ENCOUNTER (OUTPATIENT)
Dept: LAB | Facility: MEDICAL CENTER | Age: 52
End: 2022-04-01
Attending: NURSE PRACTITIONER
Payer: COMMERCIAL

## 2022-04-01 DIAGNOSIS — E55.9 VITAMIN D DEFICIENCY: ICD-10-CM

## 2022-04-01 DIAGNOSIS — E03.9 ACQUIRED HYPOTHYROIDISM: ICD-10-CM

## 2022-04-01 LAB
25(OH)D3 SERPL-MCNC: 41 NG/ML (ref 30–100)
ALBUMIN SERPL BCP-MCNC: 4.8 G/DL (ref 3.2–4.9)
ALBUMIN/GLOB SERPL: 2 G/DL
ALP SERPL-CCNC: 66 U/L (ref 30–99)
ALT SERPL-CCNC: 15 U/L (ref 2–50)
ANION GAP SERPL CALC-SCNC: 11 MMOL/L (ref 7–16)
AST SERPL-CCNC: 18 U/L (ref 12–45)
BASOPHILS # BLD AUTO: 0.6 % (ref 0–1.8)
BASOPHILS # BLD: 0.03 K/UL (ref 0–0.12)
BILIRUB SERPL-MCNC: 0.4 MG/DL (ref 0.1–1.5)
BUN SERPL-MCNC: 15 MG/DL (ref 8–22)
CALCIUM SERPL-MCNC: 9.6 MG/DL (ref 8.5–10.5)
CHLORIDE SERPL-SCNC: 100 MMOL/L (ref 96–112)
CHOLEST SERPL-MCNC: 244 MG/DL (ref 100–199)
CO2 SERPL-SCNC: 24 MMOL/L (ref 20–33)
CREAT SERPL-MCNC: 0.73 MG/DL (ref 0.5–1.4)
EOSINOPHIL # BLD AUTO: 0.17 K/UL (ref 0–0.51)
EOSINOPHIL NFR BLD: 3.5 % (ref 0–6.9)
ERYTHROCYTE [DISTWIDTH] IN BLOOD BY AUTOMATED COUNT: 45.8 FL (ref 35.9–50)
EST. AVERAGE GLUCOSE BLD GHB EST-MCNC: 103 MG/DL
FASTING STATUS PATIENT QL REPORTED: NORMAL
GFR SERPLBLD CREATININE-BSD FMLA CKD-EPI: 99 ML/MIN/1.73 M 2
GLOBULIN SER CALC-MCNC: 2.4 G/DL (ref 1.9–3.5)
GLUCOSE SERPL-MCNC: 83 MG/DL (ref 65–99)
HBA1C MFR BLD: 5.2 % (ref 4–5.6)
HCT VFR BLD AUTO: 42.2 % (ref 37–47)
HDLC SERPL-MCNC: 77 MG/DL
HGB BLD-MCNC: 13.5 G/DL (ref 12–16)
IMM GRANULOCYTES # BLD AUTO: 0 K/UL (ref 0–0.11)
IMM GRANULOCYTES NFR BLD AUTO: 0 % (ref 0–0.9)
LDLC SERPL CALC-MCNC: 155 MG/DL
LYMPHOCYTES # BLD AUTO: 1.92 K/UL (ref 1–4.8)
LYMPHOCYTES NFR BLD: 39.6 % (ref 22–41)
MCH RBC QN AUTO: 30.4 PG (ref 27–33)
MCHC RBC AUTO-ENTMCNC: 32 G/DL (ref 33.6–35)
MCV RBC AUTO: 95 FL (ref 81.4–97.8)
MONOCYTES # BLD AUTO: 0.25 K/UL (ref 0–0.85)
MONOCYTES NFR BLD AUTO: 5.2 % (ref 0–13.4)
NEUTROPHILS # BLD AUTO: 2.48 K/UL (ref 2–7.15)
NEUTROPHILS NFR BLD: 51.1 % (ref 44–72)
NRBC # BLD AUTO: 0 K/UL
NRBC BLD-RTO: 0 /100 WBC
PLATELET # BLD AUTO: 233 K/UL (ref 164–446)
PMV BLD AUTO: 10.8 FL (ref 9–12.9)
POTASSIUM SERPL-SCNC: 4.4 MMOL/L (ref 3.6–5.5)
PROT SERPL-MCNC: 7.2 G/DL (ref 6–8.2)
RBC # BLD AUTO: 4.44 M/UL (ref 4.2–5.4)
SODIUM SERPL-SCNC: 135 MMOL/L (ref 135–145)
TRIGL SERPL-MCNC: 60 MG/DL (ref 0–149)
TSH SERPL DL<=0.005 MIU/L-ACNC: 4.69 UIU/ML (ref 0.38–5.33)
WBC # BLD AUTO: 4.9 K/UL (ref 4.8–10.8)

## 2022-04-01 PROCEDURE — 83036 HEMOGLOBIN GLYCOSYLATED A1C: CPT

## 2022-04-01 PROCEDURE — 80061 LIPID PANEL: CPT

## 2022-04-01 PROCEDURE — 82306 VITAMIN D 25 HYDROXY: CPT

## 2022-04-01 PROCEDURE — 80053 COMPREHEN METABOLIC PANEL: CPT

## 2022-04-01 PROCEDURE — 36415 COLL VENOUS BLD VENIPUNCTURE: CPT

## 2022-04-01 PROCEDURE — 85025 COMPLETE CBC W/AUTO DIFF WBC: CPT

## 2022-04-01 PROCEDURE — 84443 ASSAY THYROID STIM HORMONE: CPT

## 2022-06-13 ENCOUNTER — OFFICE VISIT (OUTPATIENT)
Dept: MEDICAL GROUP | Facility: LAB | Age: 52
End: 2022-06-13
Payer: COMMERCIAL

## 2022-06-13 VITALS
HEART RATE: 64 BPM | SYSTOLIC BLOOD PRESSURE: 94 MMHG | RESPIRATION RATE: 12 BRPM | OXYGEN SATURATION: 99 % | WEIGHT: 106 LBS | TEMPERATURE: 97 F | HEIGHT: 64 IN | DIASTOLIC BLOOD PRESSURE: 54 MMHG | BODY MASS INDEX: 18.1 KG/M2

## 2022-06-13 DIAGNOSIS — E78.5 DYSLIPIDEMIA: ICD-10-CM

## 2022-06-13 DIAGNOSIS — L98.9 SKIN LESIONS: ICD-10-CM

## 2022-06-13 DIAGNOSIS — E03.9 ACQUIRED HYPOTHYROIDISM: ICD-10-CM

## 2022-06-13 DIAGNOSIS — B00.9 HERPES SIMPLEX INFECTION: ICD-10-CM

## 2022-06-13 DIAGNOSIS — M25.572 ARTHRALGIA OF LEFT FOOT: ICD-10-CM

## 2022-06-13 PROCEDURE — 99214 OFFICE O/P EST MOD 30 MIN: CPT | Performed by: FAMILY MEDICINE

## 2022-06-13 RX ORDER — VALACYCLOVIR HYDROCHLORIDE 500 MG/1
500 TABLET, FILM COATED ORAL 2 TIMES DAILY
Qty: 10 TABLET | Refills: 0 | Status: SHIPPED | OUTPATIENT
Start: 2022-06-13 | End: 2022-06-18

## 2022-06-13 RX ORDER — LEVOTHYROXINE SODIUM 0.05 MG/1
50 TABLET ORAL
COMMUNITY
End: 2022-06-13 | Stop reason: SDUPTHER

## 2022-06-13 RX ORDER — LEVOTHYROXINE SODIUM 0.03 MG/1
12.5 TABLET ORAL
Qty: 90 TABLET | Refills: 1 | Status: SHIPPED | OUTPATIENT
Start: 2022-06-13 | End: 2023-04-11 | Stop reason: SDUPTHER

## 2022-06-13 RX ORDER — VALACYCLOVIR HYDROCHLORIDE 500 MG/1
500 TABLET, FILM COATED ORAL 2 TIMES DAILY
COMMUNITY
End: 2022-06-13 | Stop reason: SDUPTHER

## 2022-06-13 ASSESSMENT — FIBROSIS 4 INDEX: FIB4 SCORE: 1.04

## 2022-06-13 NOTE — PROGRESS NOTES
Subjective:     Chief Complaint   Patient presents with   • Leg Pain     Dizziness when waking up   thyroid         HPI:   Mikal presents today with multiple complaints.     Leg pain:   Related to dizziness, worried about falling. No falls yet. Braces herself on the wall at times. Can catch herself.       Dizziness:   Worst in the mornings. Feels like balance is off. Worst with waking up.   Has been present for a few years. Better in 1-2 hours or so. Better by afternoon and bed time.   Checks BP at home, / 50's most times. Not checking too often in the AM.   Not eating much at night due to being concerned for reflux.   Diet: vegetarian with some fish, seafood. No red meat. Not much fat.   Not every day.     Thyroid:   Has been using a 1/4 of the 50 micrograms Euthyrox. Took whole pill initially and got very dizzy. Then cut this back to 1/4 tab. Has been on this for 4 years or so.     Joint pains:   Has in the past had a uric acid level checked in [place] which she reports was normal at that point.    Current Outpatient Medications Ordered in Epic   Medication Sig Dispense Refill   • omeprazole (PRILOSEC) 40 MG delayed-release capsule Take 1 Capsule by mouth 2 times a day. 180 Capsule 3   • thyroid (ARMOUR THYROID) 15 MG Tab Take 1 tablet by mouth every day. 30 tablet 3   • clotrimazole-betamethasone (LOTRISONE) 1-0.05 % Cream Apply to rash BID until clear 60 g 2   • triamcinolone acetonide (KENALOG) 0.1 % Cream Apply thin layer to rash 2 times daily as directed. 424 g 0     No current Epic-ordered facility-administered medications on file.         ROS:    Eyes: no changes in vision  ENT: no sore throat, no hearing loss, no bloody nose  Pulm: no sob, no cough  CV: no chest pain, no palpitations  GI: no nausea/vomiting, no diarrhea  : no dysuria  MSk: no myalgias  Skin: no rash        Objective:     Exam:  BP (!) 94/54 (BP Location: Left arm, Patient Position: Sitting, BP Cuff Size: Adult)   Pulse 64   Temp  "36.1 °C (97 °F)   Resp 12   Ht 1.626 m (5' 4\")   Wt 48.1 kg (106 lb)   LMP 04/23/2017   SpO2 99%   BMI 18.19 kg/m²  Body mass index is 18.19 kg/m².    Gen: Alert and oriented, No apparent distress.  Neck: Neck is supple without lymphadenopathy.  Lungs: Normal effort, CTA bilaterally, no wheezes, rhonchi, or rales  CV: Regular rate and rhythm. No murmurs, rubs, or gallops.  Ext: No clubbing, cyanosis, edema.      Assessment & Plan:     52 y.o. female with the following -     1. Herpes simplex infection  Chronic, stable.  Refill sent in.  - valACYclovir (VALTREX) 500 MG Tab; Take 1 Tablet by mouth 2 times a day for 5 days.  Dispense: 10 Tablet; Refill: 0    2. Acquired hypothyroidism  We finally sorted this mass out and it seems that she has been on a 50 mcg tablet quarter of a tab at a time.  Due to concern that this may not be giving her the most stable dosing would like to go ahead and switch her to 25 mcg tablet for a half tab every day.  She will do this for the next month and then we will get some updated information with regard to her thyroid.  May need to adjust depending upon what the labs are at this point.  - levothyroxine (SYNTHROID) 25 MCG Tab; Take 0.5 Tablets by mouth every morning on an empty stomach.  Dispense: 90 Tablet; Refill: 1  - TSH; Future  - FREE THYROXINE; Future  - TRIIDOTHYRONINE; Future    3. Dyslipidemia  Discussed her cholesterol on last check in April which was quite high related to what it has been before.  This could be related to an undercorrected thyroid as well so we will go ahead and recheck this when we do recheck her labs next in a month.  We did discuss dietary issues with trying to stick towards lean protein, lots of veggies, and seafood as she is eating.  We did discuss that she is okay to have some eggs now and then, even the Yolk  - Lipid Profile; Future    4. Arthralgia of left foot  We will recheck an uric acid level.  - URIC ACID; Future    5. Skin " lesions  Concerns for some darker pigmentation.  These appear to be seborrheic keratoses but we will have her see dermatology in general as well.  - Referral to Dermatology            No follow-ups on file.    Please note that this dictation was created using voice recognition software. I have made every reasonable attempt to correct obvious errors, but I expect that there are errors of grammar and possibly content that I did not discover before finalizing the note.

## 2022-07-13 ENCOUNTER — HOSPITAL ENCOUNTER (OUTPATIENT)
Dept: LAB | Facility: MEDICAL CENTER | Age: 52
End: 2022-07-13
Attending: FAMILY MEDICINE
Payer: COMMERCIAL

## 2022-07-13 DIAGNOSIS — M25.572 ARTHRALGIA OF LEFT FOOT: ICD-10-CM

## 2022-07-13 DIAGNOSIS — E78.5 DYSLIPIDEMIA: ICD-10-CM

## 2022-07-13 DIAGNOSIS — E03.9 ACQUIRED HYPOTHYROIDISM: ICD-10-CM

## 2022-07-13 LAB
CHOLEST SERPL-MCNC: 194 MG/DL (ref 100–199)
FASTING STATUS PATIENT QL REPORTED: NORMAL
HDLC SERPL-MCNC: 75 MG/DL
LDLC SERPL CALC-MCNC: 109 MG/DL
T3 SERPL-MCNC: 83.7 NG/DL (ref 60–181)
T4 FREE SERPL-MCNC: 1.24 NG/DL (ref 0.93–1.7)
TRIGL SERPL-MCNC: 50 MG/DL (ref 0–149)
TSH SERPL DL<=0.005 MIU/L-ACNC: 4.18 UIU/ML (ref 0.38–5.33)
URATE SERPL-MCNC: 4.4 MG/DL (ref 1.9–8.2)

## 2022-07-13 PROCEDURE — 84439 ASSAY OF FREE THYROXINE: CPT

## 2022-07-13 PROCEDURE — 36415 COLL VENOUS BLD VENIPUNCTURE: CPT

## 2022-07-13 PROCEDURE — 84550 ASSAY OF BLOOD/URIC ACID: CPT

## 2022-07-13 PROCEDURE — 84480 ASSAY TRIIODOTHYRONINE (T3): CPT

## 2022-07-13 PROCEDURE — 84443 ASSAY THYROID STIM HORMONE: CPT

## 2022-07-13 PROCEDURE — 80061 LIPID PANEL: CPT

## 2022-07-18 ENCOUNTER — OFFICE VISIT (OUTPATIENT)
Dept: MEDICAL GROUP | Facility: LAB | Age: 52
End: 2022-07-18
Payer: COMMERCIAL

## 2022-07-18 VITALS
DIASTOLIC BLOOD PRESSURE: 56 MMHG | TEMPERATURE: 97.4 F | OXYGEN SATURATION: 96 % | HEART RATE: 67 BPM | SYSTOLIC BLOOD PRESSURE: 90 MMHG | RESPIRATION RATE: 12 BRPM | BODY MASS INDEX: 18.1 KG/M2 | HEIGHT: 64 IN | WEIGHT: 106 LBS

## 2022-07-18 DIAGNOSIS — I95.0 IDIOPATHIC HYPOTENSION: ICD-10-CM

## 2022-07-18 DIAGNOSIS — R42 DIZZINESS: ICD-10-CM

## 2022-07-18 DIAGNOSIS — E03.9 ACQUIRED HYPOTHYROIDISM: ICD-10-CM

## 2022-07-18 PROCEDURE — 99213 OFFICE O/P EST LOW 20 MIN: CPT | Performed by: FAMILY MEDICINE

## 2022-07-18 RX ORDER — AMPICILLIN TRIHYDRATE 250 MG
CAPSULE ORAL
COMMUNITY
End: 2023-02-02

## 2022-07-18 ASSESSMENT — FIBROSIS 4 INDEX: FIB4 SCORE: 1.04

## 2022-07-18 NOTE — PROGRESS NOTES
"Subjective:     Chief Complaint   Patient presents with   • Follow-Up     1 month         HPI:   Mikal presents today to follow up labs. Thyroid remains stable, and lipids have improved.     Has been traveling lately.   [place].     Has been using 1/2 tab 25 mcg levothyroxine rather than 1/4 tb of 50 mcg.   She does report feeling well in general.     Still feeling like she is drifting while walking, dizzy and heads off to one side. No changes in vision. No blurriness. Feels if she looks down at the ground she is ok, but if looking up, she has more dizziness.   Going upstairs she has to hold on as well.   This has been going on for some time.   No real swelling in the legs. Right foot sometimes.     She is taking red yeast extract. Gets at [employer].     Current Outpatient Medications Ordered in Epic   Medication Sig Dispense Refill   • levothyroxine (SYNTHROID) 25 MCG Tab Take 0.5 Tablets by mouth every morning on an empty stomach. 90 Tablet 1   • omeprazole (PRILOSEC) 40 MG delayed-release capsule Take 1 Capsule by mouth 2 times a day. 180 Capsule 3   • clotrimazole-betamethasone (LOTRISONE) 1-0.05 % Cream Apply to rash BID until clear 60 g 2   • triamcinolone acetonide (KENALOG) 0.1 % Cream Apply thin layer to rash 2 times daily as directed. 424 g 0     No current Epic-ordered facility-administered medications on file.         ROS:  Gen: no fevers/chills, no changes in weight  Eyes: no changes in vision  ENT: no sore throat, no hearing loss, no bloody nose  Pulm: no sob, no cough  CV: no chest pain, no palpitations  GI: no nausea/vomiting, no diarrhea  : no dysuria  MSk: no myalgias  Skin: no rash  Neuro: no headaches, no numbness/tingling  Heme/Lymph: no easy bruising      Objective:     Exam:  BP (!) 90/56 (BP Location: Left arm, Patient Position: Sitting, BP Cuff Size: Adult)   Pulse 67   Temp 36.3 °C (97.4 °F)   Resp 12   Ht 1.626 m (5' 4\")   Wt 48.1 kg (106 lb)   LMP 04/23/2017   SpO2 96%   BMI " 18.19 kg/m²  Body mass index is 18.19 kg/m².    Gen: Alert and oriented, No apparent distress.  Neck: Neck is supple without lymphadenopathy.  Lungs: Normal effort, CTA bilaterally, no wheezes, rhonchi, or rales  CV: Regular rate and rhythm. No murmurs, rubs, or gallops.  Ext: No clubbing, cyanosis, edema.      Assessment & Plan:     52 y.o. female with the following -     1. Dizziness  Discussed assessing cardiac function due to her dizziness with an echo.  They will call and schedule this.  We did discuss that her blood pressure is a little on the low side so she can go ahead and try to add some salt to her diet as well.  She also should continue to benefit very well-hydrated given some hypotension and possibly some orthostatics.  Most of her dizziness though she complains when she does not look at the ground when she is walking she drifts off to the side.  May get to a neurologic evaluation as well depending upon what her echo is showing.  - EC-ECHOCARDIOGRAM COMPLETE W/O CONT; Future    2. Idiopathic hypotension    - EC-ECHOCARDIOGRAM COMPLETE W/O CONT; Future    3. Acquired hypothyroidism  Discussed that her thyroid looks fine.  She should continue to take the updated dose for now.  We did discuss her cholesterol has been much improved and she can continue to take her red yeast rice extract that she got from [email or web address].  - TSH; Future  - FREE THYROXINE; Future            No follow-ups on file.    Please note that this dictation was created using voice recognition software. I have made every reasonable attempt to correct obvious errors, but I expect that there are errors of grammar and possibly content that I did not discover before finalizing the note.

## 2022-07-19 ENCOUNTER — APPOINTMENT (RX ONLY)
Dept: URBAN - METROPOLITAN AREA CLINIC 6 | Facility: CLINIC | Age: 52
Setting detail: DERMATOLOGY
End: 2022-07-19

## 2022-07-19 DIAGNOSIS — L30.9 DERMATITIS, UNSPECIFIED: ICD-10-CM | Status: INADEQUATELY CONTROLLED

## 2022-07-19 DIAGNOSIS — L81.1 CHLOASMA: ICD-10-CM

## 2022-07-19 DIAGNOSIS — L82.1 OTHER SEBORRHEIC KERATOSIS: ICD-10-CM

## 2022-07-19 PROBLEM — D23.5 OTHER BENIGN NEOPLASM OF SKIN OF TRUNK: Status: ACTIVE | Noted: 2022-07-19

## 2022-07-19 PROCEDURE — ? ADDITIONAL NOTES

## 2022-07-19 PROCEDURE — 99204 OFFICE O/P NEW MOD 45 MIN: CPT

## 2022-07-19 PROCEDURE — ? DIAGNOSIS COMMENT

## 2022-07-19 PROCEDURE — ? PRESCRIPTION MEDICATION MANAGEMENT

## 2022-07-19 PROCEDURE — ? PRESCRIPTION

## 2022-07-19 PROCEDURE — ? COUNSELING

## 2022-07-19 RX ORDER — CLOBETASOL PROPIONATE 0.5 MG/G
1 OINTMENT TOPICAL BID
Qty: 60 | Refills: 0 | Status: ERX | COMMUNITY
Start: 2022-07-19

## 2022-07-19 RX ORDER — KETOCONAZOLE 20 MG/G
1 CREAM TOPICAL BID
Qty: 60 | Refills: 0 | Status: ERX | COMMUNITY
Start: 2022-07-19

## 2022-07-19 RX ORDER — TRETIONIN 0.5 MG/G
1 CREAM TOPICAL QPM
Qty: 45 | Refills: 0 | Status: ERX | COMMUNITY
Start: 2022-07-19

## 2022-07-19 RX ADMIN — KETOCONAZOLE 1: 20 CREAM TOPICAL at 00:00

## 2022-07-19 RX ADMIN — CLOBETASOL PROPIONATE 1: 0.5 OINTMENT TOPICAL at 00:00

## 2022-07-19 RX ADMIN — TRETIONIN 1: 0.5 CREAM TOPICAL at 00:00

## 2022-07-19 ASSESSMENT — LOCATION DETAILED DESCRIPTION DERM
LOCATION DETAILED: RIGHT INFERIOR CENTRAL MALAR CHEEK
LOCATION DETAILED: LEFT MEDIAL BUTTOCK
LOCATION DETAILED: RIGHT INFERIOR LATERAL MALAR CHEEK
LOCATION DETAILED: RIGHT CENTRAL MANDIBULAR CHEEK
LOCATION DETAILED: LEFT INFERIOR CENTRAL MALAR CHEEK
LOCATION DETAILED: LEFT CENTRAL ZYGOMA

## 2022-07-19 ASSESSMENT — LOCATION SIMPLE DESCRIPTION DERM
LOCATION SIMPLE: LEFT ZYGOMA
LOCATION SIMPLE: LEFT BUTTOCK
LOCATION SIMPLE: RIGHT CHEEK
LOCATION SIMPLE: LEFT CHEEK

## 2022-07-19 ASSESSMENT — LOCATION ZONE DERM
LOCATION ZONE: FACE
LOCATION ZONE: TRUNK

## 2022-07-19 NOTE — PROCEDURE: DIAGNOSIS COMMENT
Comment: No improvement with months of previous topical treatment (clotrimazole/betamethasone)
Render Risk Assessment In Note?: no
Detail Level: Simple

## 2022-07-19 NOTE — PROCEDURE: ADDITIONAL NOTES
Render Risk Assessment In Note?: no
Additional Notes: Bx next visit if no improvement
Detail Level: Simple

## 2022-07-19 NOTE — HPI: RASH
How Severe Is Your Rash?: mild
Is This A New Presentation, Or A Follow-Up?: Rash
Additional History: Patient has been apply Clotrimazole/betamethasone

## 2022-07-20 ENCOUNTER — RX ONLY (OUTPATIENT)
Age: 52
Setting detail: RX ONLY
End: 2022-07-20

## 2022-07-20 RX ORDER — TRETIONIN 0.5 MG/G
CREAM TOPICAL
Qty: 45 | Refills: 0 | Status: ERX | COMMUNITY
Start: 2022-07-20

## 2022-07-22 ENCOUNTER — HOSPITAL ENCOUNTER (OUTPATIENT)
Dept: CARDIOLOGY | Facility: MEDICAL CENTER | Age: 52
End: 2022-07-22
Attending: FAMILY MEDICINE
Payer: COMMERCIAL

## 2022-07-22 DIAGNOSIS — R42 DIZZINESS: ICD-10-CM

## 2022-07-22 DIAGNOSIS — I95.0 IDIOPATHIC HYPOTENSION: ICD-10-CM

## 2022-07-22 LAB
LV EJECT FRACT  99904: 65
LV EJECT FRACT MOD 2C 99903: 80.97
LV EJECT FRACT MOD 4C 99902: 66.55
LV EJECT FRACT MOD BP 99901: 76.22

## 2022-07-22 PROCEDURE — 93306 TTE W/DOPPLER COMPLETE: CPT

## 2022-07-22 PROCEDURE — 93306 TTE W/DOPPLER COMPLETE: CPT | Mod: 26 | Performed by: INTERNAL MEDICINE

## 2022-08-22 ENCOUNTER — HOSPITAL ENCOUNTER (OUTPATIENT)
Dept: LAB | Facility: MEDICAL CENTER | Age: 52
End: 2022-08-22
Attending: FAMILY MEDICINE
Payer: COMMERCIAL

## 2022-08-22 ENCOUNTER — OFFICE VISIT (OUTPATIENT)
Dept: MEDICAL GROUP | Facility: LAB | Age: 52
End: 2022-08-22
Payer: COMMERCIAL

## 2022-08-22 VITALS
HEIGHT: 63 IN | BODY MASS INDEX: 18.96 KG/M2 | SYSTOLIC BLOOD PRESSURE: 98 MMHG | DIASTOLIC BLOOD PRESSURE: 58 MMHG | TEMPERATURE: 97.3 F | OXYGEN SATURATION: 94 % | WEIGHT: 107 LBS | HEART RATE: 62 BPM

## 2022-08-22 DIAGNOSIS — R60.0 FACIAL EDEMA: ICD-10-CM

## 2022-08-22 DIAGNOSIS — Z12.31 ENCOUNTER FOR SCREENING MAMMOGRAM FOR MALIGNANT NEOPLASM OF BREAST: ICD-10-CM

## 2022-08-22 DIAGNOSIS — L65.9 HAIR LOSS: ICD-10-CM

## 2022-08-22 LAB
BASOPHILS # BLD AUTO: 0.4 % (ref 0–1.8)
BASOPHILS # BLD: 0.02 K/UL (ref 0–0.12)
CRP SERPL HS-MCNC: <0.3 MG/DL (ref 0–0.75)
EOSINOPHIL # BLD AUTO: 0.16 K/UL (ref 0–0.51)
EOSINOPHIL NFR BLD: 3.2 % (ref 0–6.9)
ERYTHROCYTE [DISTWIDTH] IN BLOOD BY AUTOMATED COUNT: 44.6 FL (ref 35.9–50)
ERYTHROCYTE [SEDIMENTATION RATE] IN BLOOD BY WESTERGREN METHOD: 13 MM/HOUR (ref 0–25)
HCT VFR BLD AUTO: 39.8 % (ref 37–47)
HGB BLD-MCNC: 13.2 G/DL (ref 12–16)
IMM GRANULOCYTES # BLD AUTO: 0.01 K/UL (ref 0–0.11)
IMM GRANULOCYTES NFR BLD AUTO: 0.2 % (ref 0–0.9)
LYMPHOCYTES # BLD AUTO: 1.73 K/UL (ref 1–4.8)
LYMPHOCYTES NFR BLD: 34.6 % (ref 22–41)
MCH RBC QN AUTO: 31.3 PG (ref 27–33)
MCHC RBC AUTO-ENTMCNC: 33.2 G/DL (ref 33.6–35)
MCV RBC AUTO: 94.3 FL (ref 81.4–97.8)
MONOCYTES # BLD AUTO: 0.31 K/UL (ref 0–0.85)
MONOCYTES NFR BLD AUTO: 6.2 % (ref 0–13.4)
NEUTROPHILS # BLD AUTO: 2.77 K/UL (ref 2–7.15)
NEUTROPHILS NFR BLD: 55.4 % (ref 44–72)
NRBC # BLD AUTO: 0 K/UL
NRBC BLD-RTO: 0 /100 WBC
PLATELET # BLD AUTO: 226 K/UL (ref 164–446)
PMV BLD AUTO: 10.9 FL (ref 9–12.9)
RBC # BLD AUTO: 4.22 M/UL (ref 4.2–5.4)
THYROPEROXIDASE AB SERPL-ACNC: <9 IU/ML (ref 0–9)
TSH SERPL DL<=0.005 MIU/L-ACNC: 2.86 UIU/ML (ref 0.38–5.33)
WBC # BLD AUTO: 5 K/UL (ref 4.8–10.8)

## 2022-08-22 PROCEDURE — 84443 ASSAY THYROID STIM HORMONE: CPT

## 2022-08-22 PROCEDURE — 86038 ANTINUCLEAR ANTIBODIES: CPT

## 2022-08-22 PROCEDURE — 85025 COMPLETE CBC W/AUTO DIFF WBC: CPT

## 2022-08-22 PROCEDURE — 86140 C-REACTIVE PROTEIN: CPT

## 2022-08-22 PROCEDURE — 86376 MICROSOMAL ANTIBODY EACH: CPT

## 2022-08-22 PROCEDURE — 85652 RBC SED RATE AUTOMATED: CPT

## 2022-08-22 PROCEDURE — 36415 COLL VENOUS BLD VENIPUNCTURE: CPT

## 2022-08-22 PROCEDURE — 99214 OFFICE O/P EST MOD 30 MIN: CPT | Performed by: FAMILY MEDICINE

## 2022-08-22 RX ORDER — KETOCONAZOLE 20 MG/G
CREAM TOPICAL
COMMUNITY
Start: 2022-07-19 | End: 2023-02-02

## 2022-08-22 RX ORDER — TRETINOIN 0.5 MG/G
CREAM TOPICAL
COMMUNITY
Start: 2022-07-19 | End: 2023-02-02

## 2022-08-22 RX ORDER — CLOBETASOL PROPIONATE 0.5 MG/G
OINTMENT TOPICAL
COMMUNITY
Start: 2022-07-19 | End: 2023-02-02

## 2022-08-22 ASSESSMENT — FIBROSIS 4 INDEX: FIB4 SCORE: 1.04

## 2022-08-22 NOTE — PROGRESS NOTES
Subjective:     Chief Complaint   Patient presents with    Medication Refill           HPI:   Mikal presents today for medication refills.  Dizziness still is coming and going. She does notice that if she sleeps well it is better.   Also reports recent hair loss, and feeling like her face and eyes are swollen.     Sleep in general is ok. Doing yoga every day, about an hour. This actually helps her calm down and theres no dizziness.     In the past was on larger dose of levothyroxine, and felt more dizzy.   Facial swelling gets better when she gets out of bed, and starts moving around more.     Current Outpatient Medications Ordered in Epic   Medication Sig Dispense Refill    tretinoin (RETIN-A) 0.05 % cream APPLY A PEA SIZED AMOUNT OF CREAM TO FACE AT BEDTIME. START USING 2 TO 3 TIMES A WEEK, THEN INCREASE IF TOLERATING.      ketoconazole (NIZORAL) 2 % Cream APPLY CREAM TOPICALLY TO AFFECTED AREA OF BUTTOCK ONCE DAILY FOR 14 DAYS      clobetasol (TEMOVATE) 0.05 % Ointment APPLY TO THE AFFECTED AREA OF BUTTOCK TWICE DAILY FOR 2 WEEKS AFTER KETOCONAZOLE      Red Yeast Rice 600 MG Cap Take  by mouth.      levothyroxine (SYNTHROID) 25 MCG Tab Take 0.5 Tablets by mouth every morning on an empty stomach. 90 Tablet 1    omeprazole (PRILOSEC) 40 MG delayed-release capsule Take 1 Capsule by mouth 2 times a day. 180 Capsule 3    clotrimazole-betamethasone (LOTRISONE) 1-0.05 % Cream Apply to rash BID until clear 60 g 2    triamcinolone acetonide (KENALOG) 0.1 % Cream Apply thin layer to rash 2 times daily as directed. 424 g 0     No current Epic-ordered facility-administered medications on file.         ROS:  Gen: no fevers/chills, no changes in weight  Eyes: no changes in vision  ENT: no sore throat, no hearing loss, no bloody nose  Pulm: no sob, no cough  CV: no chest pain, no palpitations  GI: no nausea/vomiting, no diarrhea  : no dysuria  MSk: no myalgias  Skin: no rash  Neuro: no headaches, no  "numbness/tingling  Heme/Lymph: no easy bruising      Objective:     Exam:  BP (!) 98/58 (BP Location: Left arm)   Pulse 62   Temp 36.3 °C (97.3 °F)   Ht 1.6 m (5' 3\")   Wt 48.5 kg (107 lb)   LMP 04/23/2017   SpO2 94%   BMI 18.95 kg/m²  Body mass index is 18.95 kg/m².    Gen: Alert and oriented, No apparent distress.  Neck: Neck is supple without lymphadenopathy.  Lungs: Normal effort, CTA bilaterally, no wheezes, rhonchi, or rales  CV: Regular rate and rhythm. No murmurs, rubs, or gallops.  Ext: No clubbing, cyanosis, edema.      Assessment & Plan:     52 y.o. female with the following -     1. Hair loss  Discussed different sources for hair loss.  We will make sure that we continue to work on her thyroid dosing to ensure this is the most optimal that we can get.  - THYROID PEROXIDASE  (TPO) AB; Future  - TSH WITH REFLEX TO FT4; Future  - CBC WITH DIFFERENTIAL; Future    2. Facial edema  Discussed some concern for this new symptom of facial edema.  Certainly it could be related to nutrition, salt intake, age and heat related but would like to make sure there is no autoimmune or inflammatory disorder as well.  - Sed Rate; Future  - CRP QUANTITIVE (NON-CARDIAC); Future  - CYNTHIA REFLEXIVE PROFILE; Future    3. Encounter for screening mammogram for malignant neoplasm of breast  Routine mammogram ordered.  - MA-SCREENING MAMMO BILAT W/TOMOSYNTHESIS W/CAD; Future            No follow-ups on file.    Please note that this dictation was created using voice recognition software. I have made every reasonable attempt to correct obvious errors, but I expect that there are errors of grammar and possibly content that I did not discover before finalizing the note.        "

## 2022-08-24 LAB — NUCLEAR IGG SER QL IA: NORMAL

## 2022-09-27 ENCOUNTER — OFFICE VISIT (OUTPATIENT)
Dept: URGENT CARE | Facility: PHYSICIAN GROUP | Age: 52
End: 2022-09-27
Payer: COMMERCIAL

## 2022-09-27 ENCOUNTER — HOSPITAL ENCOUNTER (OUTPATIENT)
Facility: MEDICAL CENTER | Age: 52
End: 2022-09-27
Attending: PHYSICIAN ASSISTANT
Payer: COMMERCIAL

## 2022-09-27 VITALS
WEIGHT: 105 LBS | OXYGEN SATURATION: 98 % | DIASTOLIC BLOOD PRESSURE: 62 MMHG | BODY MASS INDEX: 19.83 KG/M2 | HEART RATE: 68 BPM | RESPIRATION RATE: 18 BRPM | TEMPERATURE: 97.4 F | SYSTOLIC BLOOD PRESSURE: 100 MMHG | HEIGHT: 61 IN

## 2022-09-27 DIAGNOSIS — N30.01 ACUTE CYSTITIS WITH HEMATURIA: ICD-10-CM

## 2022-09-27 LAB
APPEARANCE UR: CLEAR
BILIRUB UR STRIP-MCNC: NEGATIVE MG/DL
COLOR UR AUTO: YELLOW
GLUCOSE UR STRIP.AUTO-MCNC: NEGATIVE MG/DL
KETONES UR STRIP.AUTO-MCNC: NEGATIVE MG/DL
LEUKOCYTE ESTERASE UR QL STRIP.AUTO: NORMAL
NITRITE UR QL STRIP.AUTO: NEGATIVE
PH UR STRIP.AUTO: 6.5 [PH] (ref 5–8)
PROT UR QL STRIP: NEGATIVE MG/DL
RBC UR QL AUTO: NORMAL
SP GR UR STRIP.AUTO: 1
UROBILINOGEN UR STRIP-MCNC: 0.2 MG/DL

## 2022-09-27 PROCEDURE — 87077 CULTURE AEROBIC IDENTIFY: CPT

## 2022-09-27 PROCEDURE — 99213 OFFICE O/P EST LOW 20 MIN: CPT | Performed by: PHYSICIAN ASSISTANT

## 2022-09-27 PROCEDURE — 87086 URINE CULTURE/COLONY COUNT: CPT

## 2022-09-27 PROCEDURE — 81002 URINALYSIS NONAUTO W/O SCOPE: CPT | Performed by: PHYSICIAN ASSISTANT

## 2022-09-27 PROCEDURE — 87186 SC STD MICRODIL/AGAR DIL: CPT

## 2022-09-27 RX ORDER — NITROFURANTOIN 25; 75 MG/1; MG/1
100 CAPSULE ORAL 2 TIMES DAILY
Qty: 10 CAPSULE | Refills: 0 | Status: SHIPPED | OUTPATIENT
Start: 2022-09-27 | End: 2022-10-02

## 2022-09-27 ASSESSMENT — ENCOUNTER SYMPTOMS
NAUSEA: 0
CONSTIPATION: 0
VOMITING: 0
DIARRHEA: 0
SORE THROAT: 0
ABDOMINAL PAIN: 0
CHILLS: 0
FLANK PAIN: 0
SHORTNESS OF BREATH: 0
FEVER: 0
MYALGIAS: 0
COUGH: 0
EYE PAIN: 0
HEADACHES: 0

## 2022-09-27 ASSESSMENT — FIBROSIS 4 INDEX: FIB4 SCORE: 1.07

## 2022-09-28 DIAGNOSIS — N30.01 ACUTE CYSTITIS WITH HEMATURIA: ICD-10-CM

## 2022-09-28 NOTE — PROGRESS NOTES
"Subjective:   Mikal Murillo is a 52 y.o. female who presents for Other (Possible acute cystitis,x3 days)      This is a pleasant 52-year-old female who presents with a 3-day history of urinary urgency and frequency that became more concerning for a UTI when she had more dysuria and suprapubic cramping over the last few hours.  She is not noticed any fevers or chills.  She denies any flank pain.  Last UTI was greater than 10 years ago.  She denies any vaginal discharge    Review of Systems   Constitutional:  Negative for chills and fever.   HENT:  Negative for congestion, ear pain and sore throat.    Eyes:  Negative for pain.   Respiratory:  Negative for cough and shortness of breath.    Cardiovascular:  Negative for chest pain.   Gastrointestinal:  Negative for abdominal pain, constipation, diarrhea, nausea and vomiting.   Genitourinary:  Positive for dysuria, frequency and urgency. Negative for flank pain and hematuria.   Musculoskeletal:  Negative for myalgias.   Skin:  Negative for rash.   Neurological:  Negative for headaches.     Medications, Allergies, and current problem list reviewed today in Epic.     Objective:     /62 (BP Location: Right arm, Patient Position: Sitting, BP Cuff Size: Adult)   Pulse 68   Temp 36.3 °C (97.4 °F) (Temporal)   Resp 18   Ht 1.549 m (5' 1\")   Wt 47.6 kg (105 lb)   SpO2 98%     Physical Exam  Vitals reviewed.   Constitutional:       Appearance: Normal appearance.   HENT:      Head: Normocephalic and atraumatic.      Right Ear: External ear normal.      Left Ear: External ear normal.      Nose: Nose normal.      Mouth/Throat:      Mouth: Mucous membranes are moist.   Eyes:      Conjunctiva/sclera: Conjunctivae normal.   Cardiovascular:      Rate and Rhythm: Normal rate.   Pulmonary:      Effort: Pulmonary effort is normal.   Abdominal:      Tenderness: There is no abdominal tenderness. There is no right CVA tenderness or left CVA tenderness.   Skin:     General: Skin is " warm and dry.      Capillary Refill: Capillary refill takes less than 2 seconds.   Neurological:      Mental Status: She is alert and oriented to person, place, and time.       Assessment/Plan:     Diagnosis and associated orders:     1. Acute cystitis with hematuria  POCT Urinalysis    URINE CULTURE(NEW)    nitrofurantoin (MACROBID) 100 MG Cap         Comments/MDM:     UA with leuks moderate and large blood.  History and physical combined with urinalysis consistent for uncomplicated lower urinary tract infection.  No sign of pyelonephritis or nephrolithiasis.  We will send out urine culture for confirmation but initiate empiric treatment with Macrobid.  If we need to change therapy I will call the patient otherwise will notify via [employer]         Differential diagnosis, natural history, supportive care, and indications for immediate follow-up discussed.    Advised the patient to follow-up with the primary care physician for recheck, reevaluation, and consideration of further management.    Please note that this dictation was created using voice recognition software. I have made a reasonable attempt to correct obvious errors, but I expect that there are errors of grammar and possibly content that I did not discover before finalizing the note.    This note was electronically signed by Jesus Garcia PA-C

## 2022-09-30 LAB
BACTERIA UR CULT: ABNORMAL
BACTERIA UR CULT: ABNORMAL
SIGNIFICANT IND 70042: ABNORMAL
SITE SITE: ABNORMAL
SOURCE SOURCE: ABNORMAL

## 2022-12-27 ENCOUNTER — PATIENT MESSAGE (OUTPATIENT)
Dept: MEDICAL GROUP | Facility: LAB | Age: 52
End: 2022-12-27
Payer: COMMERCIAL

## 2022-12-27 DIAGNOSIS — E03.9 ACQUIRED HYPOTHYROIDISM: ICD-10-CM

## 2023-01-09 ENCOUNTER — HOSPITAL ENCOUNTER (OUTPATIENT)
Dept: RADIOLOGY | Facility: MEDICAL CENTER | Age: 53
End: 2023-01-09
Attending: FAMILY MEDICINE
Payer: COMMERCIAL

## 2023-01-09 DIAGNOSIS — Z12.31 ENCOUNTER FOR SCREENING MAMMOGRAM FOR MALIGNANT NEOPLASM OF BREAST: ICD-10-CM

## 2023-01-09 PROCEDURE — 77063 BREAST TOMOSYNTHESIS BI: CPT

## 2023-01-23 ENCOUNTER — HOSPITAL ENCOUNTER (OUTPATIENT)
Dept: LAB | Facility: MEDICAL CENTER | Age: 53
End: 2023-01-23
Attending: FAMILY MEDICINE
Payer: COMMERCIAL

## 2023-01-23 DIAGNOSIS — E03.9 ACQUIRED HYPOTHYROIDISM: ICD-10-CM

## 2023-01-23 LAB
T4 FREE SERPL-MCNC: 1.22 NG/DL (ref 0.93–1.7)
TSH SERPL DL<=0.005 MIU/L-ACNC: 4.28 UIU/ML (ref 0.38–5.33)

## 2023-01-23 PROCEDURE — 84443 ASSAY THYROID STIM HORMONE: CPT

## 2023-01-23 PROCEDURE — 84439 ASSAY OF FREE THYROXINE: CPT

## 2023-01-23 PROCEDURE — 36415 COLL VENOUS BLD VENIPUNCTURE: CPT

## 2023-02-02 ENCOUNTER — OFFICE VISIT (OUTPATIENT)
Dept: MEDICAL GROUP | Facility: LAB | Age: 53
End: 2023-02-02
Payer: COMMERCIAL

## 2023-02-02 VITALS
TEMPERATURE: 97 F | DIASTOLIC BLOOD PRESSURE: 52 MMHG | RESPIRATION RATE: 12 BRPM | HEART RATE: 61 BPM | SYSTOLIC BLOOD PRESSURE: 92 MMHG | HEIGHT: 64 IN | WEIGHT: 104 LBS | BODY MASS INDEX: 17.75 KG/M2 | OXYGEN SATURATION: 100 %

## 2023-02-02 DIAGNOSIS — Z13.6 ENCOUNTER FOR SCREENING FOR CARDIOVASCULAR DISORDERS: ICD-10-CM

## 2023-02-02 DIAGNOSIS — E55.9 VITAMIN D DEFICIENCY: ICD-10-CM

## 2023-02-02 DIAGNOSIS — L65.9 HAIR LOSS: ICD-10-CM

## 2023-02-02 DIAGNOSIS — E03.9 ACQUIRED HYPOTHYROIDISM: ICD-10-CM

## 2023-02-02 PROCEDURE — 99214 OFFICE O/P EST MOD 30 MIN: CPT | Performed by: FAMILY MEDICINE

## 2023-02-02 RX ORDER — VALACYCLOVIR HYDROCHLORIDE 500 MG/1
500 TABLET, FILM COATED ORAL 2 TIMES DAILY
COMMUNITY
End: 2023-02-02 | Stop reason: SDUPTHER

## 2023-02-02 RX ORDER — VALACYCLOVIR HYDROCHLORIDE 500 MG/1
500 TABLET, FILM COATED ORAL 2 TIMES DAILY
Qty: 40 TABLET | Refills: 3 | Status: SHIPPED | OUTPATIENT
Start: 2023-02-02 | End: 2023-10-10 | Stop reason: SDUPTHER

## 2023-02-02 ASSESSMENT — FIBROSIS 4 INDEX: FIB4 SCORE: 1.07

## 2023-02-02 NOTE — PROGRESS NOTES
"Subjective:     Chief Complaint   Patient presents with    Hair Loss         HPI:   Mikal presents today with concerns about hair loss. She wonders if this is from her thyroid med. She is on a very small dose. Recent labs showed slightly high Tsh, but normal T4.     She does get good protein daily, meats, eggs, fish, etc.   She is very worried about stopping the levothyroxine is someone told her that she cannot just stop it because it will damage her heart.      Current Outpatient Medications Ordered in Epic   Medication Sig Dispense Refill    levothyroxine (SYNTHROID) 25 MCG Tab Take 0.5 Tablets by mouth every morning on an empty stomach. 90 Tablet 1    clotrimazole-betamethasone (LOTRISONE) 1-0.05 % Cream Apply to rash BID until clear 60 g 2     No current Epic-ordered facility-administered medications on file.         ROS:  Gen: no fevers/chills, no changes in weight  Eyes: no changes in vision  ENT: no sore throat, no hearing loss, no bloody nose  Pulm: no sob, no cough  CV: no chest pain, no palpitations  GI: no nausea/vomiting, no diarrhea  : no dysuria  MSk: no myalgias  Skin: no rash  Neuro: no headaches, no numbness/tingling  Heme/Lymph: no easy bruising      Objective:     Exam:  BP 92/52 (BP Location: Right arm, Patient Position: Sitting, BP Cuff Size: Adult)   Pulse 61   Temp 36.1 °C (97 °F)   Resp 12   Ht 1.626 m (5' 4\")   Wt 47.2 kg (104 lb)   LMP 04/23/2017   SpO2 100%   BMI 17.85 kg/m²  Body mass index is 17.85 kg/m².    Gen: AAOx3, NAD, well appearing  HEENT: NCAT, EOMI, Nares patent, Mucosa moist  Resp: Normal chest wall rise and fall, not SOB, no tachypnea  Skin: no rash or abnormality of visible skin.   Psych: normal speech, not slurred, good insight, affect full  MSK: Moves all four limbs equally and normally, gait normal        Assessment & Plan:     52 y.o. female with the following -     1. Acquired hypothyroidism  She would like an endocrinology referral so we will get this " going.  We did discuss reasons for hair loss as being multiple but oftentimes undercorrected thyroid can cause this as well.  She is given the option to go ahead and hold her medication over the next couple months to see if she notices a difference in hair thickness and hair fall but she is on willing to do this today.  - Referral to Endocrinology  - TSH; Future  - FREE THYROXINE; Future    2. Hair loss  We did discuss definitely getting enough protein in the diet and managing stressors as well.  Again we will get referred to endocrinology but also get some lab work done in April as she is due.  - Referral to Endocrinology  - CBC WITH DIFFERENTIAL; Future    3. Encounter for screening for cardiovascular disorders  Routine labs ordered for April which will be a yearly annual for her.  - Comp Metabolic Panel; Future  - HEMOGLOBIN A1C; Future  - Lipid Profile; Future    4. Vitamin D deficiency  History of deficiency in the past.  This also could be related to some hair loss.  We will supplement as needed.  - VITAMIN D,25 HYDROXY (DEFICIENCY); Future            No follow-ups on file.    Please note that this dictation was created using voice recognition software. I have made every reasonable attempt to correct obvious errors, but I expect that there are errors of grammar and possibly content that I did not discover before finalizing the note.

## 2023-04-11 DIAGNOSIS — E03.9 ACQUIRED HYPOTHYROIDISM: ICD-10-CM

## 2023-04-11 RX ORDER — LEVOTHYROXINE SODIUM 0.03 MG/1
12.5 TABLET ORAL
Qty: 90 TABLET | Refills: 1 | Status: SHIPPED | OUTPATIENT
Start: 2023-04-11

## 2023-04-11 NOTE — TELEPHONE ENCOUNTER
Received request via: Patient    Was the patient seen in the last year in this department? Yes 02/02/23    Does the patient have an active prescription (recently filled or refills available) for medication(s) requested? No    Does the patient have care home Plus and need 100 day supply (blood pressure, diabetes and cholesterol meds only)? Patient does not have SCP    Patient comment: Need this re-fill for my 2 months trip to [place], starting 4/24

## 2023-09-11 ENCOUNTER — APPOINTMENT (OUTPATIENT)
Dept: MEDICAL GROUP | Facility: LAB | Age: 53
End: 2023-09-11
Payer: COMMERCIAL

## 2023-09-20 ENCOUNTER — OFFICE VISIT (OUTPATIENT)
Dept: URGENT CARE | Facility: CLINIC | Age: 53
End: 2023-09-20
Payer: COMMERCIAL

## 2023-09-20 VITALS
RESPIRATION RATE: 18 BRPM | DIASTOLIC BLOOD PRESSURE: 60 MMHG | OXYGEN SATURATION: 99 % | BODY MASS INDEX: 17.99 KG/M2 | WEIGHT: 105.4 LBS | SYSTOLIC BLOOD PRESSURE: 102 MMHG | HEIGHT: 64 IN | TEMPERATURE: 98.6 F | HEART RATE: 67 BPM

## 2023-09-20 DIAGNOSIS — N30.01 ACUTE CYSTITIS WITH HEMATURIA: ICD-10-CM

## 2023-09-20 LAB
APPEARANCE UR: CLEAR
BILIRUB UR STRIP-MCNC: NEGATIVE MG/DL
COLOR UR AUTO: YELLOW
GLUCOSE UR STRIP.AUTO-MCNC: NEGATIVE MG/DL
KETONES UR STRIP.AUTO-MCNC: NEGATIVE MG/DL
LEUKOCYTE ESTERASE UR QL STRIP.AUTO: NORMAL
NITRITE UR QL STRIP.AUTO: NEGATIVE
PH UR STRIP.AUTO: 6 [PH] (ref 5–8)
PROT UR QL STRIP: NEGATIVE MG/DL
RBC UR QL AUTO: NORMAL
SP GR UR STRIP.AUTO: 1
UROBILINOGEN UR STRIP-MCNC: 0.2 MG/DL

## 2023-09-20 PROCEDURE — 3074F SYST BP LT 130 MM HG: CPT | Performed by: PHYSICIAN ASSISTANT

## 2023-09-20 PROCEDURE — 99213 OFFICE O/P EST LOW 20 MIN: CPT | Performed by: PHYSICIAN ASSISTANT

## 2023-09-20 PROCEDURE — 81002 URINALYSIS NONAUTO W/O SCOPE: CPT | Performed by: PHYSICIAN ASSISTANT

## 2023-09-20 PROCEDURE — 3078F DIAST BP <80 MM HG: CPT | Performed by: PHYSICIAN ASSISTANT

## 2023-09-20 RX ORDER — NITROFURANTOIN 25; 75 MG/1; MG/1
100 CAPSULE ORAL EVERY 12 HOURS
Qty: 10 CAPSULE | Refills: 0 | Status: SHIPPED | OUTPATIENT
Start: 2023-09-20 | End: 2023-09-25

## 2023-09-20 ASSESSMENT — ENCOUNTER SYMPTOMS
CHILLS: 0
DIZZINESS: 0
FLANK PAIN: 0
VOMITING: 0
ABDOMINAL PAIN: 1
NAUSEA: 0
FEVER: 0

## 2023-09-20 ASSESSMENT — FIBROSIS 4 INDEX: FIB4 SCORE: 1.09

## 2023-09-20 NOTE — PROGRESS NOTES
Subjective     Mikal Murillo is a 53 y.o. female who presents with UTI (X4days lower abdomen pressure/burning sensation/)      UTI  This is a new problem. The current episode started in the past 7 days (started ~ 4 days ago). The problem has been gradually worsening. Associated symptoms include abdominal pain and urinary symptoms (dysuria, urgency/frequency). Pertinent negatives include no chills, fever, nausea or vomiting. Nothing aggravates the symptoms. She has tried nothing for the symptoms.       Review of Systems   Constitutional:  Negative for chills, fever and malaise/fatigue.   Gastrointestinal:  Positive for abdominal pain. Negative for nausea and vomiting.   Genitourinary:  Positive for dysuria, frequency and urgency. Negative for flank pain and hematuria.   Neurological:  Negative for dizziness.         PMH:  has a past medical history of History of thyroid disease (10/14/2015), Hypothyroid, Lateral epicondylitis of right elbow (5/30/2018), Neck pain, acute (1/3/2018), and Perimenopausal vasomotor symptoms (5/6/2014).  MEDS:   Current Outpatient Medications:     nitrofurantoin (MACROBID) 100 MG Cap, Take 1 Capsule by mouth every 12 hours for 5 days., Disp: 10 Capsule, Rfl: 0    levothyroxine (SYNTHROID) 25 MCG Tab, Take 0.5 Tablets by mouth every morning on an empty stomach., Disp: 90 Tablet, Rfl: 1    valACYclovir (VALTREX) 500 MG Tab, Take 1 Tablet by mouth 2 times a day., Disp: 40 Tablet, Rfl: 3    clotrimazole-betamethasone (LOTRISONE) 1-0.05 % Cream, Apply to rash BID until clear (Patient not taking: Reported on 9/20/2023), Disp: 60 g, Rfl: 2  ALLERGIES:   Allergies   Allergen Reactions    Pcn [Penicillins] Hives     SURGHX: No past surgical history on file.  SOCHX:  reports that she has never smoked. She has never used smokeless tobacco. She reports that she does not drink alcohol and does not use drugs.  FH: Family history was reviewed, no pertinent findings to report        Objective     /60  "(BP Location: Left arm, Patient Position: Sitting)   Pulse 67   Temp 37 °C (98.6 °F) (Temporal)   Resp 18   Ht 1.619 m (5' 3.74\")   Wt 47.8 kg (105 lb 6.4 oz)   LMP 04/23/2017   SpO2 99%   BMI 18.24 kg/m²      Physical Exam  Constitutional:       Appearance: Normal appearance. She is well-developed.   HENT:      Head: Normocephalic and atraumatic.      Right Ear: External ear normal.      Left Ear: External ear normal.   Eyes:      Conjunctiva/sclera: Conjunctivae normal.      Pupils: Pupils are equal, round, and reactive to light.   Cardiovascular:      Rate and Rhythm: Normal rate.   Pulmonary:      Effort: Pulmonary effort is normal.   Abdominal:      Palpations: Abdomen is soft.      Tenderness: There is no abdominal tenderness. There is no right CVA tenderness or left CVA tenderness.   Skin:     General: Skin is warm and dry.      Capillary Refill: Capillary refill takes less than 2 seconds.   Neurological:      Mental Status: She is alert and oriented to person, place, and time.   Psychiatric:         Behavior: Behavior normal.         Judgment: Judgment normal.           POCT Urinalysis  Lab Results   Component Value Date/Time    POCCOLOR yellow 09/20/2023 10:45 AM    POCAPPEAR clear 09/20/2023 10:45 AM    POCLEUKEST trace 09/20/2023 10:45 AM    POCNITRITE negative 09/20/2023 10:45 AM    POCUROBILIGE 0.2 09/20/2023 10:45 AM    POCPROTEIN negative 09/20/2023 10:45 AM    POCURPH 6.0 09/20/2023 10:45 AM    POCBLOOD small 09/20/2023 10:45 AM    POCSPGRV 1.005 09/20/2023 10:45 AM    POCKETONES negative 09/20/2023 10:45 AM    POCBILIRUBIN negative 09/20/2023 10:45 AM    POCGLUCUA negative 09/20/2023 10:45 AM        Assessment & Plan     1. Acute cystitis with hematuria  - POCT Urinalysis  - nitrofurantoin (MACROBID) 100 MG Cap; Take 1 Capsule by mouth every 12 hours for 5 days.  Dispense: 10 Capsule; Refill: 0           Differential Diagnosis, natural history, and supportive care discussed. Return to the " Urgent Care or follow up with your PCP if symptoms fail to resolve, or for any new or worsening symptoms. Emergency room precautions discussed. Patient and/or family appears understanding of information.

## 2023-10-11 RX ORDER — VALACYCLOVIR HYDROCHLORIDE 500 MG/1
500 TABLET, FILM COATED ORAL 2 TIMES DAILY
Qty: 40 TABLET | Refills: 3 | Status: SHIPPED | OUTPATIENT
Start: 2023-10-11 | End: 2023-12-07

## 2023-12-07 ENCOUNTER — OFFICE VISIT (OUTPATIENT)
Dept: MEDICAL GROUP | Facility: LAB | Age: 53
End: 2023-12-07
Payer: COMMERCIAL

## 2023-12-07 VITALS
SYSTOLIC BLOOD PRESSURE: 96 MMHG | TEMPERATURE: 98 F | WEIGHT: 100 LBS | HEART RATE: 76 BPM | DIASTOLIC BLOOD PRESSURE: 60 MMHG | RESPIRATION RATE: 12 BRPM | OXYGEN SATURATION: 96 % | BODY MASS INDEX: 17.07 KG/M2 | HEIGHT: 64 IN

## 2023-12-07 DIAGNOSIS — M85.88 OSTEOPENIA OF LUMBAR SPINE: ICD-10-CM

## 2023-12-07 DIAGNOSIS — Z23 NEED FOR VACCINATION: ICD-10-CM

## 2023-12-07 DIAGNOSIS — R91.1 LUNG NODULE SEEN ON IMAGING STUDY: ICD-10-CM

## 2023-12-07 DIAGNOSIS — M47.812 CERVICAL SPONDYLOSIS: ICD-10-CM

## 2023-12-07 DIAGNOSIS — R10.13 EPIGASTRIC PAIN: ICD-10-CM

## 2023-12-07 PROCEDURE — 3078F DIAST BP <80 MM HG: CPT | Performed by: FAMILY MEDICINE

## 2023-12-07 PROCEDURE — 3074F SYST BP LT 130 MM HG: CPT | Performed by: FAMILY MEDICINE

## 2023-12-07 PROCEDURE — 99214 OFFICE O/P EST MOD 30 MIN: CPT | Mod: 25 | Performed by: FAMILY MEDICINE

## 2023-12-07 PROCEDURE — 90686 IIV4 VACC NO PRSV 0.5 ML IM: CPT | Performed by: FAMILY MEDICINE

## 2023-12-07 PROCEDURE — 90471 IMMUNIZATION ADMIN: CPT | Performed by: FAMILY MEDICINE

## 2023-12-07 RX ORDER — CLOTRIMAZOLE AND BETAMETHASONE DIPROPIONATE 10; .64 MG/G; MG/G
1 CREAM TOPICAL 2 TIMES DAILY
Qty: 45 G | Refills: 3 | Status: SHIPPED | OUTPATIENT
Start: 2023-12-07

## 2023-12-07 RX ORDER — CLOTRIMAZOLE AND BETAMETHASONE DIPROPIONATE 10; .64 MG/G; MG/G
1 CREAM TOPICAL 2 TIMES DAILY
COMMUNITY
End: 2023-12-07 | Stop reason: SDUPTHER

## 2023-12-07 ASSESSMENT — FIBROSIS 4 INDEX: FIB4 SCORE: 1.09

## 2023-12-23 ENCOUNTER — HOSPITAL ENCOUNTER (OUTPATIENT)
Dept: LAB | Facility: MEDICAL CENTER | Age: 53
End: 2023-12-23
Attending: FAMILY MEDICINE
Payer: COMMERCIAL

## 2023-12-23 DIAGNOSIS — E55.9 VITAMIN D DEFICIENCY: ICD-10-CM

## 2023-12-23 DIAGNOSIS — Z13.6 ENCOUNTER FOR SCREENING FOR CARDIOVASCULAR DISORDERS: ICD-10-CM

## 2023-12-23 DIAGNOSIS — E03.9 ACQUIRED HYPOTHYROIDISM: ICD-10-CM

## 2023-12-23 DIAGNOSIS — L65.9 HAIR LOSS: ICD-10-CM

## 2023-12-23 LAB
25(OH)D3 SERPL-MCNC: 37 NG/ML (ref 30–100)
ALBUMIN SERPL BCP-MCNC: 4.4 G/DL (ref 3.2–4.9)
ALBUMIN/GLOB SERPL: 1.6 G/DL
ALP SERPL-CCNC: 63 U/L (ref 30–99)
ALT SERPL-CCNC: 10 U/L (ref 2–50)
ANION GAP SERPL CALC-SCNC: 8 MMOL/L (ref 7–16)
AST SERPL-CCNC: 14 U/L (ref 12–45)
BASOPHILS # BLD AUTO: 0.5 % (ref 0–1.8)
BASOPHILS # BLD: 0.02 K/UL (ref 0–0.12)
BILIRUB SERPL-MCNC: 0.3 MG/DL (ref 0.1–1.5)
BUN SERPL-MCNC: 12 MG/DL (ref 8–22)
CALCIUM ALBUM COR SERPL-MCNC: 8.8 MG/DL (ref 8.5–10.5)
CALCIUM SERPL-MCNC: 9.1 MG/DL (ref 8.4–10.2)
CHLORIDE SERPL-SCNC: 106 MMOL/L (ref 96–112)
CHOLEST SERPL-MCNC: 213 MG/DL (ref 100–199)
CO2 SERPL-SCNC: 25 MMOL/L (ref 20–33)
CREAT SERPL-MCNC: 0.69 MG/DL (ref 0.5–1.4)
EOSINOPHIL # BLD AUTO: 0.17 K/UL (ref 0–0.51)
EOSINOPHIL NFR BLD: 4.5 % (ref 0–6.9)
ERYTHROCYTE [DISTWIDTH] IN BLOOD BY AUTOMATED COUNT: 47.6 FL (ref 35.9–50)
EST. AVERAGE GLUCOSE BLD GHB EST-MCNC: 108 MG/DL
FASTING STATUS PATIENT QL REPORTED: NORMAL
GFR SERPLBLD CREATININE-BSD FMLA CKD-EPI: 103 ML/MIN/1.73 M 2
GLOBULIN SER CALC-MCNC: 2.7 G/DL (ref 1.9–3.5)
GLUCOSE SERPL-MCNC: 92 MG/DL (ref 65–99)
HBA1C MFR BLD: 5.4 % (ref 4–5.6)
HCT VFR BLD AUTO: 40.2 % (ref 37–47)
HDLC SERPL-MCNC: 76 MG/DL
HGB BLD-MCNC: 12.9 G/DL (ref 12–16)
IMM GRANULOCYTES # BLD AUTO: 0.01 K/UL (ref 0–0.11)
IMM GRANULOCYTES NFR BLD AUTO: 0.3 % (ref 0–0.9)
LDLC SERPL CALC-MCNC: 127 MG/DL
LYMPHOCYTES # BLD AUTO: 1.67 K/UL (ref 1–4.8)
LYMPHOCYTES NFR BLD: 44.4 % (ref 22–41)
MCH RBC QN AUTO: 30.7 PG (ref 27–33)
MCHC RBC AUTO-ENTMCNC: 32.1 G/DL (ref 32.2–35.5)
MCV RBC AUTO: 95.7 FL (ref 81.4–97.8)
MONOCYTES # BLD AUTO: 0.24 K/UL (ref 0–0.85)
MONOCYTES NFR BLD AUTO: 6.4 % (ref 0–13.4)
NEUTROPHILS # BLD AUTO: 1.65 K/UL (ref 1.82–7.42)
NEUTROPHILS NFR BLD: 43.9 % (ref 44–72)
NRBC # BLD AUTO: 0 K/UL
NRBC BLD-RTO: 0 /100 WBC (ref 0–0.2)
PLATELET # BLD AUTO: 215 K/UL (ref 164–446)
PMV BLD AUTO: 10.3 FL (ref 9–12.9)
POTASSIUM SERPL-SCNC: 4.1 MMOL/L (ref 3.6–5.5)
PROT SERPL-MCNC: 7.1 G/DL (ref 6–8.2)
RBC # BLD AUTO: 4.2 M/UL (ref 4.2–5.4)
SODIUM SERPL-SCNC: 139 MMOL/L (ref 135–145)
T4 FREE SERPL-MCNC: 1.21 NG/DL (ref 0.93–1.7)
TRIGL SERPL-MCNC: 51 MG/DL (ref 0–149)
TSH SERPL DL<=0.005 MIU/L-ACNC: 2.78 UIU/ML (ref 0.38–5.33)
WBC # BLD AUTO: 3.8 K/UL (ref 4.8–10.8)

## 2023-12-23 PROCEDURE — 36415 COLL VENOUS BLD VENIPUNCTURE: CPT

## 2023-12-23 PROCEDURE — 80053 COMPREHEN METABOLIC PANEL: CPT

## 2023-12-23 PROCEDURE — 82306 VITAMIN D 25 HYDROXY: CPT

## 2023-12-23 PROCEDURE — 80061 LIPID PANEL: CPT

## 2023-12-23 PROCEDURE — 85025 COMPLETE CBC W/AUTO DIFF WBC: CPT

## 2023-12-23 PROCEDURE — 84443 ASSAY THYROID STIM HORMONE: CPT

## 2023-12-23 PROCEDURE — 83036 HEMOGLOBIN GLYCOSYLATED A1C: CPT

## 2023-12-23 PROCEDURE — 84439 ASSAY OF FREE THYROXINE: CPT

## 2024-01-29 ENCOUNTER — APPOINTMENT (OUTPATIENT)
Dept: PHYSICAL THERAPY | Facility: MEDICAL CENTER | Age: 54
End: 2024-01-29
Attending: FAMILY MEDICINE
Payer: COMMERCIAL

## 2024-02-06 ENCOUNTER — APPOINTMENT (OUTPATIENT)
Dept: PHYSICAL THERAPY | Facility: MEDICAL CENTER | Age: 54
End: 2024-02-06
Attending: FAMILY MEDICINE
Payer: COMMERCIAL

## 2024-02-14 ENCOUNTER — APPOINTMENT (OUTPATIENT)
Dept: PHYSICAL THERAPY | Facility: MEDICAL CENTER | Age: 54
End: 2024-02-14
Attending: FAMILY MEDICINE
Payer: COMMERCIAL

## 2024-02-21 ENCOUNTER — APPOINTMENT (OUTPATIENT)
Dept: PHYSICAL THERAPY | Facility: MEDICAL CENTER | Age: 54
End: 2024-02-21
Attending: FAMILY MEDICINE
Payer: COMMERCIAL

## 2024-02-28 ENCOUNTER — APPOINTMENT (OUTPATIENT)
Dept: PHYSICAL THERAPY | Facility: MEDICAL CENTER | Age: 54
End: 2024-02-28
Attending: FAMILY MEDICINE
Payer: COMMERCIAL

## 2024-03-06 ENCOUNTER — APPOINTMENT (OUTPATIENT)
Dept: PHYSICAL THERAPY | Facility: MEDICAL CENTER | Age: 54
End: 2024-03-06
Attending: FAMILY MEDICINE
Payer: COMMERCIAL

## 2024-03-13 ENCOUNTER — APPOINTMENT (OUTPATIENT)
Dept: PHYSICAL THERAPY | Facility: MEDICAL CENTER | Age: 54
End: 2024-03-13
Attending: FAMILY MEDICINE
Payer: COMMERCIAL

## 2024-03-20 ENCOUNTER — APPOINTMENT (OUTPATIENT)
Dept: PHYSICAL THERAPY | Facility: MEDICAL CENTER | Age: 54
End: 2024-03-20
Attending: FAMILY MEDICINE
Payer: COMMERCIAL

## 2024-03-27 ENCOUNTER — APPOINTMENT (OUTPATIENT)
Dept: PHYSICAL THERAPY | Facility: MEDICAL CENTER | Age: 54
End: 2024-03-27
Attending: FAMILY MEDICINE
Payer: COMMERCIAL

## 2024-04-13 DIAGNOSIS — E03.9 ACQUIRED HYPOTHYROIDISM: ICD-10-CM

## 2024-04-15 RX ORDER — LEVOTHYROXINE SODIUM 0.03 MG/1
12.5 TABLET ORAL
Qty: 90 TABLET | Refills: 1 | Status: SHIPPED | OUTPATIENT
Start: 2024-04-15

## 2024-04-15 NOTE — TELEPHONE ENCOUNTER
Received request via: Patient    Was the patient seen in the last year in this department? Yes    Does the patient have an active prescription (recently filled or refills available) for medication(s) requested? No    Pharmacy Name: [facility]    Does the patient have [facility] and need 100 day supply (blood pressure, diabetes and cholesterol meds only)? Patient does not have SCP

## 2024-05-21 ENCOUNTER — PATIENT MESSAGE (OUTPATIENT)
Dept: MEDICAL GROUP | Facility: LAB | Age: 54
End: 2024-05-21
Payer: COMMERCIAL

## 2024-05-21 DIAGNOSIS — Z12.11 COLON CANCER SCREENING: ICD-10-CM

## 2024-08-06 ENCOUNTER — OFFICE VISIT (OUTPATIENT)
Dept: MEDICAL GROUP | Facility: LAB | Age: 54
End: 2024-08-06
Payer: COMMERCIAL

## 2024-08-06 ENCOUNTER — HOSPITAL ENCOUNTER (OUTPATIENT)
Facility: MEDICAL CENTER | Age: 54
End: 2024-08-06
Attending: FAMILY MEDICINE
Payer: COMMERCIAL

## 2024-08-06 VITALS
TEMPERATURE: 97.2 F | WEIGHT: 104 LBS | BODY MASS INDEX: 17.75 KG/M2 | OXYGEN SATURATION: 97 % | SYSTOLIC BLOOD PRESSURE: 94 MMHG | HEIGHT: 64 IN | DIASTOLIC BLOOD PRESSURE: 56 MMHG | RESPIRATION RATE: 16 BRPM | HEART RATE: 68 BPM

## 2024-08-06 DIAGNOSIS — R30.0 DYSURIA: ICD-10-CM

## 2024-08-06 DIAGNOSIS — Z12.31 ENCOUNTER FOR SCREENING MAMMOGRAM FOR MALIGNANT NEOPLASM OF BREAST: ICD-10-CM

## 2024-08-06 DIAGNOSIS — K21.9 GASTROESOPHAGEAL REFLUX DISEASE WITHOUT ESOPHAGITIS: ICD-10-CM

## 2024-08-06 DIAGNOSIS — E03.9 ACQUIRED HYPOTHYROIDISM: ICD-10-CM

## 2024-08-06 DIAGNOSIS — E55.9 VITAMIN D DEFICIENCY: ICD-10-CM

## 2024-08-06 DIAGNOSIS — Z13.6 ENCOUNTER FOR SCREENING FOR CARDIOVASCULAR DISORDERS: ICD-10-CM

## 2024-08-06 LAB
APPEARANCE UR: CLEAR
BILIRUB UR STRIP-MCNC: NEGATIVE MG/DL
COLOR UR AUTO: YELLOW
GLUCOSE UR STRIP.AUTO-MCNC: NEGATIVE MG/DL
KETONES UR STRIP.AUTO-MCNC: NEGATIVE MG/DL
LEUKOCYTE ESTERASE UR QL STRIP.AUTO: NEGATIVE
NITRITE UR QL STRIP.AUTO: NEGATIVE
PH UR STRIP.AUTO: 6 [PH] (ref 5–8)
PROT UR QL STRIP: NEGATIVE MG/DL
RBC UR QL AUTO: NORMAL
SP GR UR STRIP.AUTO: 1.03
UROBILINOGEN UR STRIP-MCNC: 0.2 MG/DL

## 2024-08-06 PROCEDURE — 3078F DIAST BP <80 MM HG: CPT | Performed by: FAMILY MEDICINE

## 2024-08-06 PROCEDURE — 99214 OFFICE O/P EST MOD 30 MIN: CPT | Performed by: FAMILY MEDICINE

## 2024-08-06 PROCEDURE — 81002 URINALYSIS NONAUTO W/O SCOPE: CPT | Performed by: FAMILY MEDICINE

## 2024-08-06 PROCEDURE — 87086 URINE CULTURE/COLONY COUNT: CPT

## 2024-08-06 PROCEDURE — 3074F SYST BP LT 130 MM HG: CPT | Performed by: FAMILY MEDICINE

## 2024-08-06 ASSESSMENT — FIBROSIS 4 INDEX: FIB4 SCORE: 1.11

## 2024-08-06 NOTE — PROGRESS NOTES
"Subjective:     Chief Complaint   Patient presents with    Hyperthyroidism    UTI    Requesting Labs                HPI:   Mikal presents today for multiple concerns.   Last few days some urinary concerns, vaginal dryness, heavy feeling. Some increased frequency, pelvic pressure. Today does seem better.   Some pain with urinating.     Would like some labs as well for general health. Due for pap/pelvic later this month.     Complaining of some chest discomfort, worse after food. Sometimes hard to swallow, like things get stuck. Not all the time. Foods that are harder take a longer time to go down. Worse with onion, garlic, red pepper. Has tried sima seltzer. Helped short term, didn't fix it.   Never had EGD. Worried about complications. Would rather try meds.     Current Outpatient Medications Ordered in Epic   Medication Sig Dispense Refill    levothyroxine (SYNTHROID) 25 MCG Tab Take 0.5 Tablets by mouth every morning on an empty stomach. 90 Tablet 1    clotrimazole-betamethasone (LOTRISONE) 1-0.05 % Cream Apply 1 Application topically 2 times a day. Pea sized amount to affected area twice daily for up to 2 weeks. 45 g 3     No current [facility]-ordered facility-administered medications on file.         ROS:  Gen: no fevers/chills, no changes in weight  Eyes: no changes in vision  ENT: no sore throat, no hearing loss, no bloody nose  Pulm: no sob, no cough  CV: no chest pain, no palpitations  MSk: no myalgias  Skin: no rash  Neuro: no headaches, no numbness/tingling  Heme/Lymph: no easy bruising      Objective:     Exam:  BP 94/56   Pulse 68   Temp 36.2 °C (97.2 °F)   Resp 16   Ht 1.626 m (5' 4\")   Wt 47.2 kg (104 lb)   LMP 04/23/2017   SpO2 97%   BMI 17.85 kg/m²  Body mass index is 17.85 kg/m².    Gen: Alert and oriented, No apparent distress.  Neck: Neck is supple without lymphadenopathy.  Lungs: Normal effort, CTA bilaterally, no wheezes, rhonchi, or rales  CV: Regular rate and rhythm. No murmurs, rubs, or " gallops.  Ext: No clubbing, cyanosis, edema.      Assessment & Plan:     54 y.o. female with the following -     1. Encounter for screening mammogram for malignant neoplasm of breast    - MA-SCREENING MAMMO BILAT W/TOMOSYNTHESIS W/CAD; Future    2. Dysuria  Discussed dysuria.  There is just trace blood present in the urine today.  Will send for culture but at this point I do not really think she needs treatment just yet.  - POCT Urinalysis  - URINE CULTURE(NEW); Future    3. Encounter for screening for cardiovascular disorders  Discussed routine lab work that she needs.  She will come back and do her Pap and pelvic exam at another date.  - CBC WITH DIFFERENTIAL; Future  - Comp Metabolic Panel; Future  - HEMOGLOBIN A1C; Future  - Lipid Profile; Future    4.Acquired hypothyroidism    - TSH WITH REFLEX TO FT4; Future    5. Vitamin D deficiency    - VITAMIN D,25 HYDROXY (DEFICIENCY); Future    6. Gastroesophageal reflux disease without esophagitis  We did discuss possible EGD.  She is worried about going under anesthesia and perforation.  Will try a course of omeprazole to see if this helps some of the chest discomfort and the feelings that she is having.  She can report next week if things are beneficial or not.  Discussed different foods to try to eliminate which could be more precipitating of reflux disease.  - omeprazole (PRILOSEC) 20 MG delayed-release capsule; Take 1 Capsule by mouth every day.  Dispense: 30 Capsule; Refill: 1            No follow-ups on file.    Please note that this dictation was created using voice recognition software. I have made every reasonable attempt to correct obvious errors, but I expect that there are errors of grammar and possibly content that I did not discover before finalizing the note.

## 2024-08-07 ENCOUNTER — HOSPITAL ENCOUNTER (OUTPATIENT)
Dept: LAB | Facility: MEDICAL CENTER | Age: 54
End: 2024-08-07
Attending: FAMILY MEDICINE
Payer: COMMERCIAL

## 2024-08-07 DIAGNOSIS — E03.9 ACQUIRED HYPOTHYROIDISM: ICD-10-CM

## 2024-08-07 DIAGNOSIS — E55.9 VITAMIN D DEFICIENCY: ICD-10-CM

## 2024-08-07 DIAGNOSIS — Z13.6 ENCOUNTER FOR SCREENING FOR CARDIOVASCULAR DISORDERS: ICD-10-CM

## 2024-08-07 LAB
25(OH)D3 SERPL-MCNC: 46 NG/ML (ref 30–100)
ALBUMIN SERPL BCP-MCNC: 4.3 G/DL (ref 3.2–4.9)
ALBUMIN/GLOB SERPL: 1.3 G/DL
ALP SERPL-CCNC: 67 U/L (ref 30–99)
ALT SERPL-CCNC: 10 U/L (ref 2–50)
ANION GAP SERPL CALC-SCNC: 10 MMOL/L (ref 7–16)
AST SERPL-CCNC: 22 U/L (ref 12–45)
BASOPHILS # BLD AUTO: 0.2 % (ref 0–1.8)
BASOPHILS # BLD: 0.01 K/UL (ref 0–0.12)
BILIRUB SERPL-MCNC: 0.3 MG/DL (ref 0.1–1.5)
BUN SERPL-MCNC: 19 MG/DL (ref 8–22)
CALCIUM ALBUM COR SERPL-MCNC: 9.5 MG/DL (ref 8.5–10.5)
CALCIUM SERPL-MCNC: 9.7 MG/DL (ref 8.5–10.5)
CHLORIDE SERPL-SCNC: 105 MMOL/L (ref 96–112)
CHOLEST SERPL-MCNC: 205 MG/DL (ref 100–199)
CO2 SERPL-SCNC: 24 MMOL/L (ref 20–33)
CREAT SERPL-MCNC: 0.68 MG/DL (ref 0.5–1.4)
EOSINOPHIL # BLD AUTO: 0.11 K/UL (ref 0–0.51)
EOSINOPHIL NFR BLD: 2.4 % (ref 0–6.9)
ERYTHROCYTE [DISTWIDTH] IN BLOOD BY AUTOMATED COUNT: 47.8 FL (ref 35.9–50)
EST. AVERAGE GLUCOSE BLD GHB EST-MCNC: 108 MG/DL
FASTING STATUS PATIENT QL REPORTED: NORMAL
GFR SERPLBLD CREATININE-BSD FMLA CKD-EPI: 103 ML/MIN/1.73 M 2
GLOBULIN SER CALC-MCNC: 3.2 G/DL (ref 1.9–3.5)
GLUCOSE SERPL-MCNC: 87 MG/DL (ref 65–99)
HBA1C MFR BLD: 5.4 % (ref 4–5.6)
HCT VFR BLD AUTO: 41.5 % (ref 37–47)
HDLC SERPL-MCNC: 73 MG/DL
HGB BLD-MCNC: 13.1 G/DL (ref 12–16)
IMM GRANULOCYTES # BLD AUTO: 0.01 K/UL (ref 0–0.11)
IMM GRANULOCYTES NFR BLD AUTO: 0.2 % (ref 0–0.9)
LDLC SERPL CALC-MCNC: 123 MG/DL
LYMPHOCYTES # BLD AUTO: 1.56 K/UL (ref 1–4.8)
LYMPHOCYTES NFR BLD: 33.9 % (ref 22–41)
MCH RBC QN AUTO: 30.3 PG (ref 27–33)
MCHC RBC AUTO-ENTMCNC: 31.6 G/DL (ref 32.2–35.5)
MCV RBC AUTO: 96.1 FL (ref 81.4–97.8)
MONOCYTES # BLD AUTO: 0.32 K/UL (ref 0–0.85)
MONOCYTES NFR BLD AUTO: 7 % (ref 0–13.4)
NEUTROPHILS # BLD AUTO: 2.59 K/UL (ref 1.82–7.42)
NEUTROPHILS NFR BLD: 56.3 % (ref 44–72)
NRBC # BLD AUTO: 0 K/UL
NRBC BLD-RTO: 0 /100 WBC (ref 0–0.2)
PLATELET # BLD AUTO: 233 K/UL (ref 164–446)
PMV BLD AUTO: 11.4 FL (ref 9–12.9)
POTASSIUM SERPL-SCNC: 4.6 MMOL/L (ref 3.6–5.5)
PROT SERPL-MCNC: 7.5 G/DL (ref 6–8.2)
RBC # BLD AUTO: 4.32 M/UL (ref 4.2–5.4)
SODIUM SERPL-SCNC: 139 MMOL/L (ref 135–145)
TRIGL SERPL-MCNC: 46 MG/DL (ref 0–149)
TSH SERPL DL<=0.005 MIU/L-ACNC: 4.17 UIU/ML (ref 0.38–5.33)
WBC # BLD AUTO: 4.6 K/UL (ref 4.8–10.8)

## 2024-08-07 PROCEDURE — 80053 COMPREHEN METABOLIC PANEL: CPT

## 2024-08-07 PROCEDURE — 36415 COLL VENOUS BLD VENIPUNCTURE: CPT

## 2024-08-07 PROCEDURE — 82306 VITAMIN D 25 HYDROXY: CPT

## 2024-08-07 PROCEDURE — 85025 COMPLETE CBC W/AUTO DIFF WBC: CPT

## 2024-08-07 PROCEDURE — 84443 ASSAY THYROID STIM HORMONE: CPT

## 2024-08-07 PROCEDURE — 83036 HEMOGLOBIN GLYCOSYLATED A1C: CPT

## 2024-08-07 PROCEDURE — 80061 LIPID PANEL: CPT

## 2024-08-08 ENCOUNTER — HOSPITAL ENCOUNTER (OUTPATIENT)
Dept: RADIOLOGY | Facility: MEDICAL CENTER | Age: 54
End: 2024-08-08
Attending: FAMILY MEDICINE
Payer: COMMERCIAL

## 2024-08-08 DIAGNOSIS — Z12.31 ENCOUNTER FOR SCREENING MAMMOGRAM FOR MALIGNANT NEOPLASM OF BREAST: ICD-10-CM

## 2024-08-08 PROCEDURE — 77067 SCR MAMMO BI INCL CAD: CPT

## 2024-08-09 LAB
BACTERIA UR CULT: NORMAL
SIGNIFICANT IND 70042: NORMAL
SITE SITE: NORMAL
SOURCE SOURCE: NORMAL

## 2024-08-11 SDOH — ECONOMIC STABILITY: FOOD INSECURITY: WITHIN THE PAST 12 MONTHS, YOU WORRIED THAT YOUR FOOD WOULD RUN OUT BEFORE YOU GOT MONEY TO BUY MORE.: NEVER TRUE

## 2024-08-11 SDOH — ECONOMIC STABILITY: TRANSPORTATION INSECURITY
IN THE PAST 12 MONTHS, HAS THE LACK OF TRANSPORTATION KEPT YOU FROM MEDICAL APPOINTMENTS OR FROM GETTING MEDICATIONS?: NO

## 2024-08-11 SDOH — ECONOMIC STABILITY: INCOME INSECURITY: IN THE LAST 12 MONTHS, WAS THERE A TIME WHEN YOU WERE NOT ABLE TO PAY THE MORTGAGE OR RENT ON TIME?: NO

## 2024-08-11 SDOH — ECONOMIC STABILITY: HOUSING INSECURITY: IN THE LAST 12 MONTHS, HOW MANY PLACES HAVE YOU LIVED?: 1

## 2024-08-11 SDOH — HEALTH STABILITY: PHYSICAL HEALTH: ON AVERAGE, HOW MANY DAYS PER WEEK DO YOU ENGAGE IN MODERATE TO STRENUOUS EXERCISE (LIKE A BRISK WALK)?: 5 DAYS

## 2024-08-11 SDOH — ECONOMIC STABILITY: HOUSING INSECURITY
IN THE LAST 12 MONTHS, WAS THERE A TIME WHEN YOU DID NOT HAVE A STEADY PLACE TO SLEEP OR SLEPT IN A SHELTER (INCLUDING NOW)?: NO

## 2024-08-11 SDOH — HEALTH STABILITY: PHYSICAL HEALTH: ON AVERAGE, HOW MANY MINUTES DO YOU ENGAGE IN EXERCISE AT THIS LEVEL?: 30 MIN

## 2024-08-11 SDOH — HEALTH STABILITY: MENTAL HEALTH
STRESS IS WHEN SOMEONE FEELS TENSE, NERVOUS, ANXIOUS, OR CAN'T SLEEP AT NIGHT BECAUSE THEIR MIND IS TROUBLED. HOW STRESSED ARE YOU?: TO SOME EXTENT

## 2024-08-11 SDOH — ECONOMIC STABILITY: FOOD INSECURITY: WITHIN THE PAST 12 MONTHS, THE FOOD YOU BOUGHT JUST DIDN'T LAST AND YOU DIDN'T HAVE MONEY TO GET MORE.: NEVER TRUE

## 2024-08-11 SDOH — ECONOMIC STABILITY: INCOME INSECURITY: HOW HARD IS IT FOR YOU TO PAY FOR THE VERY BASICS LIKE FOOD, HOUSING, MEDICAL CARE, AND HEATING?: NOT HARD AT ALL

## 2024-08-11 SDOH — ECONOMIC STABILITY: TRANSPORTATION INSECURITY
IN THE PAST 12 MONTHS, HAS LACK OF RELIABLE TRANSPORTATION KEPT YOU FROM MEDICAL APPOINTMENTS, MEETINGS, WORK OR FROM GETTING THINGS NEEDED FOR DAILY LIVING?: NO

## 2024-08-11 SDOH — ECONOMIC STABILITY: TRANSPORTATION INSECURITY
IN THE PAST 12 MONTHS, HAS LACK OF TRANSPORTATION KEPT YOU FROM MEETINGS, WORK, OR FROM GETTING THINGS NEEDED FOR DAILY LIVING?: NO

## 2024-08-11 ASSESSMENT — SOCIAL DETERMINANTS OF HEALTH (SDOH)
HOW HARD IS IT FOR YOU TO PAY FOR THE VERY BASICS LIKE FOOD, HOUSING, MEDICAL CARE, AND HEATING?: NOT HARD AT ALL
HOW OFTEN DO YOU ATTEND CHURCH OR RELIGIOUS SERVICES?: 1 TO 4 TIMES PER YEAR
HOW OFTEN DO YOU GET TOGETHER WITH FRIENDS OR RELATIVES?: TWICE A WEEK
HOW OFTEN DO YOU GET TOGETHER WITH FRIENDS OR RELATIVES?: TWICE A WEEK
WITHIN THE PAST 12 MONTHS, YOU WORRIED THAT YOUR FOOD WOULD RUN OUT BEFORE YOU GOT THE MONEY TO BUY MORE: NEVER TRUE
HOW OFTEN DO YOU HAVE A DRINK CONTAINING ALCOHOL: MONTHLY OR LESS
HOW OFTEN DO YOU HAVE SIX OR MORE DRINKS ON ONE OCCASION: NEVER
HOW OFTEN DO YOU ATTENT MEETINGS OF THE CLUB OR ORGANIZATION YOU BELONG TO?: NEVER
IN A TYPICAL WEEK, HOW MANY TIMES DO YOU TALK ON THE PHONE WITH FAMILY, FRIENDS, OR NEIGHBORS?: MORE THAN THREE TIMES A WEEK
HOW OFTEN DO YOU ATTEND CHURCH OR RELIGIOUS SERVICES?: 1 TO 4 TIMES PER YEAR
DO YOU BELONG TO ANY CLUBS OR ORGANIZATIONS SUCH AS CHURCH GROUPS UNIONS, FRATERNAL OR ATHLETIC GROUPS, OR SCHOOL GROUPS?: NO
HOW OFTEN DO YOU ATTENT MEETINGS OF THE CLUB OR ORGANIZATION YOU BELONG TO?: NEVER
IN A TYPICAL WEEK, HOW MANY TIMES DO YOU TALK ON THE PHONE WITH FAMILY, FRIENDS, OR NEIGHBORS?: MORE THAN THREE TIMES A WEEK
DO YOU BELONG TO ANY CLUBS OR ORGANIZATIONS SUCH AS CHURCH GROUPS UNIONS, FRATERNAL OR ATHLETIC GROUPS, OR SCHOOL GROUPS?: NO
HOW MANY DRINKS CONTAINING ALCOHOL DO YOU HAVE ON A TYPICAL DAY WHEN YOU ARE DRINKING: PATIENT DOES NOT DRINK
IN THE PAST 12 MONTHS, HAS THE ELECTRIC, GAS, OIL, OR WATER COMPANY THREATENED TO SHUT OFF SERVICE IN YOUR HOME?: NO

## 2024-08-11 ASSESSMENT — LIFESTYLE VARIABLES
AUDIT-C TOTAL SCORE: 1
HOW OFTEN DO YOU HAVE SIX OR MORE DRINKS ON ONE OCCASION: NEVER
HOW OFTEN DO YOU HAVE A DRINK CONTAINING ALCOHOL: MONTHLY OR LESS
HOW MANY STANDARD DRINKS CONTAINING ALCOHOL DO YOU HAVE ON A TYPICAL DAY: PATIENT DOES NOT DRINK
SKIP TO QUESTIONS 9-10: 1

## 2024-08-12 ENCOUNTER — OFFICE VISIT (OUTPATIENT)
Dept: MEDICAL GROUP | Facility: LAB | Age: 54
End: 2024-08-12
Payer: COMMERCIAL

## 2024-08-12 ENCOUNTER — HOSPITAL ENCOUNTER (OUTPATIENT)
Facility: MEDICAL CENTER | Age: 54
End: 2024-08-12
Attending: FAMILY MEDICINE
Payer: COMMERCIAL

## 2024-08-12 VITALS
HEIGHT: 64 IN | WEIGHT: 104 LBS | OXYGEN SATURATION: 95 % | SYSTOLIC BLOOD PRESSURE: 100 MMHG | RESPIRATION RATE: 16 BRPM | BODY MASS INDEX: 17.75 KG/M2 | DIASTOLIC BLOOD PRESSURE: 54 MMHG | HEART RATE: 66 BPM | TEMPERATURE: 97.4 F

## 2024-08-12 DIAGNOSIS — Z00.00 WELLNESS EXAMINATION: ICD-10-CM

## 2024-08-12 DIAGNOSIS — Z12.4 SCREENING FOR CERVICAL CANCER: ICD-10-CM

## 2024-08-12 PROCEDURE — 3074F SYST BP LT 130 MM HG: CPT | Performed by: FAMILY MEDICINE

## 2024-08-12 PROCEDURE — 99396 PREV VISIT EST AGE 40-64: CPT | Performed by: FAMILY MEDICINE

## 2024-08-12 PROCEDURE — 88175 CYTOPATH C/V AUTO FLUID REDO: CPT

## 2024-08-12 PROCEDURE — 3078F DIAST BP <80 MM HG: CPT | Performed by: FAMILY MEDICINE

## 2024-08-12 PROCEDURE — 87624 HPV HI-RISK TYP POOLED RSLT: CPT

## 2024-08-12 ASSESSMENT — PATIENT HEALTH QUESTIONNAIRE - PHQ9: CLINICAL INTERPRETATION OF PHQ2 SCORE: 0

## 2024-08-12 ASSESSMENT — FIBROSIS 4 INDEX: FIB4 SCORE: 1.61

## 2024-08-12 NOTE — PROGRESS NOTES
"Subjective:     Chief Complaint   Patient presents with    Annual Exam         HPI:   Mikal presents today for annual exam.     She did her labs last week.   Due for pap.   Mammo: August 2024, normal.     Started last week on PPI for some esophagitis/stomach upset.   She does note things seem a bit better.     No tobacco, no alcohol     Diet: No beef, other animal proteins. No ice cream, no sugar.   Lots of veggies.   Exercise: yoga daily, yeimi at times     Sleep: good.             Current Outpatient Medications Ordered in Epic   Medication Sig Dispense Refill    omeprazole (PRILOSEC) 20 MG delayed-release capsule Take 1 Capsule by mouth every day. 30 Capsule 1    levothyroxine (SYNTHROID) 25 MCG Tab Take 0.5 Tablets by mouth every morning on an empty stomach. 90 Tablet 1    clotrimazole-betamethasone (LOTRISONE) 1-0.05 % Cream Apply 1 Application topically 2 times a day. Pea sized amount to affected area twice daily for up to 2 weeks. 45 g 3     No current [facility]-ordered facility-administered medications on file.         ROS:  Gen: no fevers/chills, no changes in weight  Eyes: no changes in vision  ENT: no sore throat, no hearing loss, no bloody nose  Pulm: no sob, no cough  CV: no chest pain, no palpitations  GI: no nausea/vomiting, no diarrhea  : no dysuria  MSk: no myalgias  Skin: no rash  Neuro: no headaches, no numbness/tingling  Heme/Lymph: no easy bruising      Objective:     Exam:  /54   Pulse 66   Temp 36.3 °C (97.4 °F)   Resp 16   Ht 1.626 m (5' 4\")   Wt 47.2 kg (104 lb)   LMP 04/23/2017   SpO2 95%   BMI 17.85 kg/m²  Body mass index is 17.85 kg/m².    Gen: Alert and oriented, No apparent distress.  Neck: Neck is supple without lymphadenopathy.  Lungs: Normal effort, CTA bilaterally, no wheezes, rhonchi, or rales  CV: Regular rate and rhythm. No murmurs, rubs, or gallops.  Ext: No clubbing, cyanosis, edema.  : Normal externally.  Physiologic discharge present on speculum exam.  Pap " collected today.  Cervix appears pink and healthy.  Mild atrophy noted.  No masses appreciated on bimanual.    Assessment & Plan:     54 y.o. female with the following -     1. Wellness examination  Discussed diet and exercise recommendations.  Reviewed her recent lab work as well.  Overall she is doing quite well.  We did discuss completing a full month of omeprazole for some reflux symptoms and then trying to stop this and see how she does.  We did discuss possibility of seeing GI for an upper endoscopy if symptoms come right back.    2. Screening for cervical cancer  Discussed Pap schedule.  Discussed doing this every 5 years as long as cotesting stays negative.  - THINPREP PAP WITH HPV; Future          No follow-ups on file.    Please note that this dictation was created using voice recognition software. I have made every reasonable attempt to correct obvious errors, but I expect that there are errors of grammar and possibly content that I did not discover before finalizing the note.

## 2024-08-15 LAB
CYTOLOGIST CVX/VAG CYTO: NORMAL
CYTOLOGY CVX/VAG DOC CYTO: NORMAL
CYTOLOGY CVX/VAG DOC THIN PREP: NORMAL
HPV I/H RISK 4 DNA CVX QL PROBE+SIG AMP: NEGATIVE
NOTE NL11727A: NORMAL
OTHER STN SPEC: NORMAL
STAT OF ADQ CVX/VAG CYTO-IMP: NORMAL

## 2024-10-31 ENCOUNTER — OFFICE VISIT (OUTPATIENT)
Dept: MEDICAL GROUP | Facility: LAB | Age: 54
End: 2024-10-31
Payer: COMMERCIAL

## 2024-10-31 ENCOUNTER — HOSPITAL ENCOUNTER (OUTPATIENT)
Dept: LAB | Facility: MEDICAL CENTER | Age: 54
End: 2024-10-31
Attending: FAMILY MEDICINE
Payer: COMMERCIAL

## 2024-10-31 VITALS
OXYGEN SATURATION: 97 % | DIASTOLIC BLOOD PRESSURE: 60 MMHG | BODY MASS INDEX: 17.75 KG/M2 | RESPIRATION RATE: 16 BRPM | HEART RATE: 64 BPM | WEIGHT: 104 LBS | HEIGHT: 64 IN | SYSTOLIC BLOOD PRESSURE: 96 MMHG | TEMPERATURE: 96.9 F

## 2024-10-31 DIAGNOSIS — R51.9 CHRONIC DAILY HEADACHE: ICD-10-CM

## 2024-10-31 DIAGNOSIS — R42 DIZZINESS: ICD-10-CM

## 2024-10-31 LAB
AMORPHOUS CRYSTALS 1764: PRESENT /HPF
ANION GAP SERPL CALC-SCNC: 13 MMOL/L (ref 7–16)
APPEARANCE UR: ABNORMAL
BACTERIA #/AREA URNS HPF: ABNORMAL /HPF
BASOPHILS # BLD AUTO: 0.5 % (ref 0–1.8)
BASOPHILS # BLD: 0.02 K/UL (ref 0–0.12)
BILIRUB UR QL STRIP.AUTO: NEGATIVE
BUN SERPL-MCNC: 23 MG/DL (ref 8–22)
CALCIUM SERPL-MCNC: 9.3 MG/DL (ref 8.5–10.5)
CASTS URNS QL MICRO: ABNORMAL /LPF (ref 0–2)
CHLORIDE SERPL-SCNC: 102 MMOL/L (ref 96–112)
CO2 SERPL-SCNC: 25 MMOL/L (ref 20–33)
COLOR UR: YELLOW
CREAT SERPL-MCNC: 0.58 MG/DL (ref 0.5–1.4)
EOSINOPHIL # BLD AUTO: 0.14 K/UL (ref 0–0.51)
EOSINOPHIL NFR BLD: 3.4 % (ref 0–6.9)
EPITHELIAL CELLS 1715: ABNORMAL /HPF (ref 0–5)
ERYTHROCYTE [DISTWIDTH] IN BLOOD BY AUTOMATED COUNT: 47 FL (ref 35.9–50)
GFR SERPLBLD CREATININE-BSD FMLA CKD-EPI: 107 ML/MIN/1.73 M 2
GLUCOSE SERPL-MCNC: 75 MG/DL (ref 65–99)
GLUCOSE UR STRIP.AUTO-MCNC: NEGATIVE MG/DL
HCT VFR BLD AUTO: 40.7 % (ref 37–47)
HGB BLD-MCNC: 12.8 G/DL (ref 12–16)
IMM GRANULOCYTES # BLD AUTO: 0 K/UL (ref 0–0.11)
IMM GRANULOCYTES NFR BLD AUTO: 0 % (ref 0–0.9)
KETONES UR STRIP.AUTO-MCNC: NEGATIVE MG/DL
LEUKOCYTE ESTERASE UR QL STRIP.AUTO: NEGATIVE
LYMPHOCYTES # BLD AUTO: 1.33 K/UL (ref 1–4.8)
LYMPHOCYTES NFR BLD: 31.9 % (ref 22–41)
MCH RBC QN AUTO: 30 PG (ref 27–33)
MCHC RBC AUTO-ENTMCNC: 31.4 G/DL (ref 32.2–35.5)
MCV RBC AUTO: 95.5 FL (ref 81.4–97.8)
MICRO URNS: ABNORMAL
MONOCYTES # BLD AUTO: 0.29 K/UL (ref 0–0.85)
MONOCYTES NFR BLD AUTO: 7 % (ref 0–13.4)
NEUTROPHILS # BLD AUTO: 2.39 K/UL (ref 1.82–7.42)
NEUTROPHILS NFR BLD: 57.2 % (ref 44–72)
NITRITE UR QL STRIP.AUTO: NEGATIVE
NRBC # BLD AUTO: 0 K/UL
NRBC BLD-RTO: 0 /100 WBC (ref 0–0.2)
PH UR STRIP.AUTO: 7.5 [PH] (ref 5–8)
PLATELET # BLD AUTO: 225 K/UL (ref 164–446)
PMV BLD AUTO: 11.2 FL (ref 9–12.9)
POTASSIUM SERPL-SCNC: 4.4 MMOL/L (ref 3.6–5.5)
PROT UR QL STRIP: NEGATIVE MG/DL
RBC # BLD AUTO: 4.26 M/UL (ref 4.2–5.4)
RBC # URNS HPF: ABNORMAL /HPF (ref 0–2)
RBC UR QL AUTO: NEGATIVE
SODIUM SERPL-SCNC: 140 MMOL/L (ref 135–145)
SP GR UR STRIP.AUTO: 1.03
TSH SERPL DL<=0.005 MIU/L-ACNC: 2.21 UIU/ML (ref 0.38–5.33)
UROBILINOGEN UR STRIP.AUTO-MCNC: 1 EU/DL
WBC # BLD AUTO: 4.2 K/UL (ref 4.8–10.8)
WBC #/AREA URNS HPF: ABNORMAL /HPF

## 2024-10-31 PROCEDURE — 80048 BASIC METABOLIC PNL TOTAL CA: CPT

## 2024-10-31 PROCEDURE — 81015 MICROSCOPIC EXAM OF URINE: CPT

## 2024-10-31 PROCEDURE — 36415 COLL VENOUS BLD VENIPUNCTURE: CPT

## 2024-10-31 PROCEDURE — 85025 COMPLETE CBC W/AUTO DIFF WBC: CPT

## 2024-10-31 PROCEDURE — 81003 URINALYSIS AUTO W/O SCOPE: CPT

## 2024-10-31 PROCEDURE — 84443 ASSAY THYROID STIM HORMONE: CPT

## 2024-10-31 ASSESSMENT — FIBROSIS 4 INDEX: FIB4 SCORE: 1.61

## 2024-11-13 ENCOUNTER — HOSPITAL ENCOUNTER (OUTPATIENT)
Dept: RADIOLOGY | Facility: MEDICAL CENTER | Age: 54
End: 2024-11-13
Attending: FAMILY MEDICINE
Payer: COMMERCIAL

## 2024-11-13 DIAGNOSIS — R42 DIZZINESS: ICD-10-CM

## 2024-11-13 DIAGNOSIS — R51.9 CHRONIC DAILY HEADACHE: ICD-10-CM

## 2024-11-13 PROCEDURE — 70450 CT HEAD/BRAIN W/O DYE: CPT

## 2024-12-17 ENCOUNTER — APPOINTMENT (OUTPATIENT)
Dept: MEDICAL GROUP | Facility: LAB | Age: 54
End: 2024-12-17
Payer: COMMERCIAL

## 2025-01-02 ENCOUNTER — APPOINTMENT (OUTPATIENT)
Dept: MEDICAL GROUP | Facility: LAB | Age: 55
End: 2025-01-02
Payer: COMMERCIAL

## 2025-01-02 VITALS
BODY MASS INDEX: 18.27 KG/M2 | DIASTOLIC BLOOD PRESSURE: 66 MMHG | HEART RATE: 66 BPM | TEMPERATURE: 97.1 F | RESPIRATION RATE: 16 BRPM | SYSTOLIC BLOOD PRESSURE: 98 MMHG | WEIGHT: 107 LBS | HEIGHT: 64 IN | OXYGEN SATURATION: 96 %

## 2025-01-02 DIAGNOSIS — M54.50 ACUTE MIDLINE LOW BACK PAIN WITHOUT SCIATICA: ICD-10-CM

## 2025-01-02 DIAGNOSIS — M53.3 SI (SACROILIAC) JOINT DYSFUNCTION: ICD-10-CM

## 2025-01-02 PROCEDURE — 99213 OFFICE O/P EST LOW 20 MIN: CPT | Performed by: FAMILY MEDICINE

## 2025-01-02 PROCEDURE — 3074F SYST BP LT 130 MM HG: CPT | Performed by: FAMILY MEDICINE

## 2025-01-02 PROCEDURE — 3078F DIAST BP <80 MM HG: CPT | Performed by: FAMILY MEDICINE

## 2025-01-02 ASSESSMENT — FIBROSIS 4 INDEX: FIB4 SCORE: 1.67

## 2025-01-02 NOTE — PROGRESS NOTES
"Subjective:     Chief Complaint   Patient presents with    Back Pain     X2 weeks go          HPI:   Mikal presents today for back pain for two weeks. Was doing some lifting of things around the holidays and started having central pain.     No history of back pain or chronic injuries in the past. Much less pain now, but still there. Initially there was radiating pain down the legs, both sides. Now into the pelvis as well.     Lying down is the best position. Standing for a long time or sitting for a long time hurts. Bending forward causes more pain as well.   Traditionally has been doing yoga regularly so this is new for her.     No weakness noted to legs.     Initially tried some ibuprofen which helped a bit. Tried heating pad as well which helped a lot.           Current Outpatient Medications Ordered in Epic   Medication Sig Dispense Refill    omeprazole (PRILOSEC) 20 MG delayed-release capsule Take 1 Capsule by mouth every day. 30 Capsule 1    levothyroxine (SYNTHROID) 25 MCG Tab Take 0.5 Tablets by mouth every morning on an empty stomach. 90 Tablet 1    clotrimazole-betamethasone (LOTRISONE) 1-0.05 % Cream Apply 1 Application topically 2 times a day. Pea sized amount to affected area twice daily for up to 2 weeks. 45 g 3     No current [facility]-ordered facility-administered medications on file.         ROS:  Gen: no fevers/chills, no changes in weight  Eyes: no changes in vision  ENT: no sore throat, no hearing loss, no bloody nose  Pulm: no sob, no cough  CV: no chest pain, no palpitations  GI: no nausea/vomiting, no diarrhea  : no dysuria  MSk: no myalgias  Skin: no rash  Neuro: no headaches, no numbness/tingling  Heme/Lymph: no easy bruising      Objective:     Exam:  BP 98/66   Pulse 66   Temp 36.2 °C (97.1 °F)   Resp 16   Ht 1.626 m (5' 4\")   Wt 48.5 kg (107 lb)   LMP 04/23/2017   SpO2 96%   BMI 18.37 kg/m²  Body mass index is 18.37 kg/m².    Gen: AAOx3, NAD, well appearing  HEENT: NCAT, EOMI, " Nares patent, Mucosa moist  Resp: Normal chest wall rise and fall, not SOB, no tachypnea  Skin: no rash or abnormality of visible skin.   Psych: normal speech, not slurred, good insight, affect full  MSK: Moves all four limbs equally and normally, gait normal  Area of tenderness is about the level of the SI joints and L5.  No masses appreciated.  No step-offs or crepitus noted.  She has pretty good range of motion in her lower extremities particularly the hip and the knees and the ankles.  She does have some tenderness and tightness with forward flexion and also extension at the lumbar spine.  Alignment otherwise pretty good.    Assessment & Plan:     54 y.o. female with the following -     1. Acute midline low back pain without sciatica  Discussed referral to physical therapy.  Discussed some different modalities that they can use including dry needling and/or cupping and soft tissue modalities.  We did discuss use of ibuprofen as needed and heating pad which can be very helpful.  - Referral to Physical Therapy    2. SI (sacroiliac) joint dysfunction  Discussed possibility in the future of injections depending upon how she does with this overall.  It is probably going to be fairly short lived and self-limited given her health and baseline mobility.  - Referral to Physical Therapy            No follow-ups on file.    Please note that this dictation was created using voice recognition software. I have made every reasonable attempt to correct obvious errors, but I expect that there are errors of grammar and possibly content that I did not discover before finalizing the note.

## 2025-01-15 ENCOUNTER — APPOINTMENT (OUTPATIENT)
Dept: PHYSICAL THERAPY | Facility: REHABILITATION | Age: 55
End: 2025-01-15
Attending: FAMILY MEDICINE
Payer: COMMERCIAL

## 2025-01-22 ENCOUNTER — APPOINTMENT (OUTPATIENT)
Dept: PHYSICAL THERAPY | Facility: REHABILITATION | Age: 55
End: 2025-01-22
Attending: FAMILY MEDICINE
Payer: COMMERCIAL

## 2025-01-29 ENCOUNTER — APPOINTMENT (OUTPATIENT)
Dept: PHYSICAL THERAPY | Facility: REHABILITATION | Age: 55
End: 2025-01-29
Attending: FAMILY MEDICINE
Payer: COMMERCIAL

## 2025-02-05 ENCOUNTER — APPOINTMENT (OUTPATIENT)
Dept: PHYSICAL THERAPY | Facility: REHABILITATION | Age: 55
End: 2025-02-05
Attending: FAMILY MEDICINE
Payer: COMMERCIAL

## 2025-02-12 ENCOUNTER — APPOINTMENT (OUTPATIENT)
Dept: PHYSICAL THERAPY | Facility: REHABILITATION | Age: 55
End: 2025-02-12
Attending: FAMILY MEDICINE
Payer: COMMERCIAL

## 2025-02-19 ENCOUNTER — APPOINTMENT (OUTPATIENT)
Dept: PHYSICAL THERAPY | Facility: REHABILITATION | Age: 55
End: 2025-02-19
Attending: FAMILY MEDICINE
Payer: COMMERCIAL

## 2025-02-26 ENCOUNTER — APPOINTMENT (OUTPATIENT)
Dept: PHYSICAL THERAPY | Facility: REHABILITATION | Age: 55
End: 2025-02-26
Attending: FAMILY MEDICINE
Payer: COMMERCIAL

## 2025-03-05 ENCOUNTER — APPOINTMENT (OUTPATIENT)
Dept: PHYSICAL THERAPY | Facility: REHABILITATION | Age: 55
End: 2025-03-05
Attending: FAMILY MEDICINE
Payer: COMMERCIAL

## 2025-03-12 ENCOUNTER — APPOINTMENT (OUTPATIENT)
Dept: PHYSICAL THERAPY | Facility: REHABILITATION | Age: 55
End: 2025-03-12
Attending: FAMILY MEDICINE
Payer: COMMERCIAL

## 2025-03-19 DIAGNOSIS — E03.9 ACQUIRED HYPOTHYROIDISM: ICD-10-CM

## 2025-03-20 RX ORDER — LEVOTHYROXINE SODIUM 25 UG/1
TABLET ORAL
Qty: 90 TABLET | Refills: 0 | Status: SHIPPED | OUTPATIENT
Start: 2025-03-20

## 2025-03-24 ENCOUNTER — TELEPHONE (OUTPATIENT)
Dept: MEDICAL GROUP | Facility: LAB | Age: 55
End: 2025-03-24
Payer: COMMERCIAL

## 2025-03-24 RX ORDER — NITROFURANTOIN 25; 75 MG/1; MG/1
100 CAPSULE ORAL 2 TIMES DAILY
Qty: 10 CAPSULE | Refills: 0 | Status: SHIPPED | OUTPATIENT
Start: 2025-03-24 | End: 2025-03-29

## 2025-03-24 NOTE — TELEPHONE ENCOUNTER
Pt  requesting a prescription for nitrofurantoin (MACROBID) 100 MG before leaving the country for 2 months on Thursday . He states she is having UTI symptoms. He not sure what kind of symptoms  she not home. Please advise

## 2025-06-15 ENCOUNTER — PATIENT MESSAGE (OUTPATIENT)
Dept: MEDICAL GROUP | Facility: LAB | Age: 55
End: 2025-06-15
Payer: COMMERCIAL

## 2025-06-30 ENCOUNTER — PATIENT MESSAGE (OUTPATIENT)
Dept: MEDICAL GROUP | Facility: LAB | Age: 55
End: 2025-06-30
Payer: COMMERCIAL

## 2025-06-30 DIAGNOSIS — Z13.820 SCREENING FOR OSTEOPOROSIS: Primary | ICD-10-CM

## 2025-07-11 ENCOUNTER — OFFICE VISIT (OUTPATIENT)
Dept: URGENT CARE | Facility: CLINIC | Age: 55
End: 2025-07-11
Payer: COMMERCIAL

## 2025-07-11 VITALS
TEMPERATURE: 97 F | RESPIRATION RATE: 16 BRPM | BODY MASS INDEX: 18.96 KG/M2 | WEIGHT: 107 LBS | HEART RATE: 60 BPM | SYSTOLIC BLOOD PRESSURE: 106 MMHG | OXYGEN SATURATION: 98 % | HEIGHT: 63 IN | DIASTOLIC BLOOD PRESSURE: 64 MMHG

## 2025-07-11 DIAGNOSIS — L50.9 URTICARIA: ICD-10-CM

## 2025-07-11 DIAGNOSIS — R21 MACULOPAPULAR RASH: Primary | ICD-10-CM

## 2025-07-11 PROCEDURE — 3074F SYST BP LT 130 MM HG: CPT

## 2025-07-11 PROCEDURE — 99213 OFFICE O/P EST LOW 20 MIN: CPT

## 2025-07-11 PROCEDURE — 3078F DIAST BP <80 MM HG: CPT

## 2025-07-11 RX ORDER — CETIRIZINE HYDROCHLORIDE 10 MG/1
10 TABLET ORAL DAILY
Qty: 30 TABLET | Refills: 1 | Status: SHIPPED | OUTPATIENT
Start: 2025-07-11

## 2025-07-11 RX ORDER — METHYLPREDNISOLONE 4 MG/1
TABLET ORAL
Qty: 21 TABLET | Refills: 0 | Status: SHIPPED | OUTPATIENT
Start: 2025-07-11

## 2025-07-11 ASSESSMENT — ENCOUNTER SYMPTOMS
FEVER: 0
WHEEZING: 0
SHORTNESS OF BREATH: 0
EYE DISCHARGE: 0
CHILLS: 0
STRIDOR: 0
EYE REDNESS: 0
NAUSEA: 0
ABDOMINAL PAIN: 0
COUGH: 0
DIARRHEA: 0
VOMITING: 0

## 2025-07-11 ASSESSMENT — FIBROSIS 4 INDEX: FIB4 SCORE: 1.7

## 2025-07-11 NOTE — PROGRESS NOTES
Subjective:   CHIEF COMPLAINT  Chief Complaint   Patient presents with    Rash     24 hrs random rashes all over her body very itchy inflamed not as bad now seems to come and go.         Mikal Murillo is a very pleasant 55 y.o. female who presents for Rash (24 hrs random rashes all over her body very itchy inflamed not as bad now seems to come and go.)      Presents with complaints of generalized rash that is itchy and slightly raised.  States her symptoms started approximately 24 hours ago.  She denies any history of wheezing, shortness of breath, cough, fever, nausea vomiting or diarrhea.  States that rash has waxed and waned in severity and location.  No recent hikes or travel, no new detergents, soaps, lotions, or any other topical allergen that patient can identify.  She does state that she recently had nikhil which she has not had in a long time seem to upset her stomach a little bit and then rash followed.  She does have a history of allergies to penicillins though no known environmental or seasonal allergies.  She has not tried any treatment at this point    Rash  Pertinent negatives include no cough, diarrhea, fever, shortness of breath or vomiting.       Review of Systems   Constitutional:  Negative for chills and fever.   Eyes:  Negative for discharge and redness.   Respiratory:  Negative for cough, shortness of breath, wheezing and stridor.    Cardiovascular:  Negative for chest pain.   Gastrointestinal:  Negative for abdominal pain, diarrhea, nausea and vomiting.   Skin:  Positive for itching and rash.   All other systems reviewed and are negative.    Refer to HPI for additional details.    During this visit, appropriate PPE was worn, and hand hygiene was performed.    PMH:  has a past medical history of Allergy, Anxiety, GERD (gastroesophageal reflux disease), History of thyroid disease (10/14/2015), Hypothyroid, Lateral epicondylitis of right elbow (05/30/2018), Neck pain, acute (01/03/2018), and  "Perimenopausal vasomotor symptoms (05/06/2014).    MEDS: Current Medications[1]    ALLERGIES: Allergies[2]  SURGHX: Past Surgical History[3]  SOCHX:  reports that she has never smoked. She has never used smokeless tobacco. She reports that she does not drink alcohol and does not use drugs.    FH: Per HPI as applicable/pertinent.    Medications, Allergies, and current problem list reviewed today in Epic.     Objective:     /64 (BP Location: Left arm, Patient Position: Sitting, BP Cuff Size: Adult)   Pulse 60   Temp 36.1 °C (97 °F) (Temporal)   Resp 16   Ht 1.6 m (5' 2.99\")   Wt 48.5 kg (107 lb)   SpO2 98%     Physical Exam  Vitals reviewed.   Constitutional:       General: She is not in acute distress.     Appearance: Normal appearance. She is normal weight. She is not ill-appearing.   HENT:      Head: Normocephalic and atraumatic.      Right Ear: External ear normal.      Left Ear: External ear normal.      Nose: Nose normal. No congestion or rhinorrhea.      Mouth/Throat:      Mouth: Mucous membranes are moist.      Pharynx: Oropharynx is clear. No oropharyngeal exudate or posterior oropharyngeal erythema.   Eyes:      Conjunctiva/sclera: Conjunctivae normal.      Pupils: Pupils are equal, round, and reactive to light.   Cardiovascular:      Rate and Rhythm: Normal rate.      Pulses: Normal pulses.   Pulmonary:      Effort: Pulmonary effort is normal. No respiratory distress.      Breath sounds: Normal breath sounds. No stridor. No wheezing.   Skin:     Findings: Rash present. Rash is macular, papular and urticarial. Rash is not crusting, nodular, purpuric, pustular, scaling or vesicular.          Neurological:      Mental Status: She is alert.         Assessment/Plan:     Diagnosis and associated orders:     1. Maculopapular rash  - methylPREDNISolone (MEDROL DOSEPAK) 4 MG Tablet Therapy Pack; Follow schedule on package instructions.  Dispense: 21 Tablet; Refill: 0  - cetirizine (ZYRTEC ALLERGY) 10 MG " Tab; Take 1 Tablet by mouth every day.  Dispense: 30 Tablet; Refill: 1    2. Urticaria  - methylPREDNISolone (MEDROL DOSEPAK) 4 MG Tablet Therapy Pack; Follow schedule on package instructions.  Dispense: 21 Tablet; Refill: 0  - cetirizine (ZYRTEC ALLERGY) 10 MG Tab; Take 1 Tablet by mouth every day.  Dispense: 30 Tablet; Refill: 1     Comments/MDM:     I discussed HPI and physical exam with patient.  She presents well in clinic with stable vital signs and no signs of respiratory distress.  I discussed with her that I have suspicion likely food possibly from the nikhil she ate given the late in onset and waxing waning nature of her rash.  Advised patient to try and identify any triggers, avoid maintenance at this time.  Will treat symptoms with Medrol pack and Zyrtec.  ED precautions given for any new or worsening symptoms or signs of respiratory distress/anaphylaxis  Follow up in 3-5 days if no improvement in symptoms           Differential diagnosis, natural history, supportive care, and indications for immediate follow-up discussed.    Advised the patient to follow-up with the primary care physician for recheck, reevaluation, and consideration of further management.    Please note that this dictation was created using voice recognition software. I have made a reasonable attempt to correct obvious errors, but I expect that there are errors of grammar and possibly content that I did not discover before finalizing the note.    This note was electronically signed by RAVIN Robles       [1]   Current Outpatient Medications:     methylPREDNISolone (MEDROL DOSEPAK) 4 MG Tablet Therapy Pack, Follow schedule on package instructions., Disp: 21 Tablet, Rfl: 0    cetirizine (ZYRTEC ALLERGY) 10 MG Tab, Take 1 Tablet by mouth every day., Disp: 30 Tablet, Rfl: 1    levothyroxine (SYNTHROID) 25 MCG Tab, TAKE 1/2 (ONE-HALF) TABLET BY MOUTH IN THE MORNING ON AN EMPTY STOMACH, Disp: 90 Tablet, Rfl: 0    omeprazole  (PRILOSEC) 20 MG delayed-release capsule, Take 1 Capsule by mouth every day. (Patient not taking: Reported on 7/11/2025), Disp: 30 Capsule, Rfl: 1    clotrimazole-betamethasone (LOTRISONE) 1-0.05 % Cream, Apply 1 Application topically 2 times a day. Pea sized amount to affected area twice daily for up to 2 weeks. (Patient not taking: Reported on 7/11/2025), Disp: 45 g, Rfl: 3  [2]   Allergies  Allergen Reactions    Pcn [Penicillins] Hives   [3] History reviewed. No pertinent surgical history.

## 2025-07-13 ENCOUNTER — APPOINTMENT (OUTPATIENT)
Dept: URGENT CARE | Facility: CLINIC | Age: 55
End: 2025-07-13
Payer: COMMERCIAL

## 2025-08-11 ENCOUNTER — APPOINTMENT (OUTPATIENT)
Dept: RADIOLOGY | Facility: MEDICAL CENTER | Age: 55
End: 2025-08-11
Attending: FAMILY MEDICINE
Payer: COMMERCIAL

## 2025-08-11 ASSESSMENT — FIBROSIS 4 INDEX: FIB4 SCORE: 1.7

## 2025-08-14 ENCOUNTER — APPOINTMENT (OUTPATIENT)
Dept: MEDICAL GROUP | Facility: LAB | Age: 55
End: 2025-08-14
Payer: COMMERCIAL

## 2025-08-14 ASSESSMENT — FIBROSIS 4 INDEX: FIB4 SCORE: 1.7

## 2025-08-19 ENCOUNTER — RESULTS FOLLOW-UP (OUTPATIENT)
Dept: MEDICAL GROUP | Facility: LAB | Age: 55
End: 2025-08-19
Payer: COMMERCIAL

## 2025-08-20 ENCOUNTER — HOSPITAL ENCOUNTER (OUTPATIENT)
Dept: RADIOLOGY | Facility: MEDICAL CENTER | Age: 55
End: 2025-08-20
Attending: NURSE PRACTITIONER
Payer: COMMERCIAL

## 2025-08-20 DIAGNOSIS — Z13.820 SCREENING FOR OSTEOPOROSIS: ICD-10-CM

## 2025-08-20 PROCEDURE — 77080 DXA BONE DENSITY AXIAL: CPT

## 2025-08-25 ENCOUNTER — RESULTS FOLLOW-UP (OUTPATIENT)
Dept: MEDICAL GROUP | Facility: LAB | Age: 55
End: 2025-08-25
Payer: COMMERCIAL

## 2025-08-29 ENCOUNTER — HOSPITAL ENCOUNTER (OUTPATIENT)
Dept: LAB | Facility: MEDICAL CENTER | Age: 55
End: 2025-08-29
Attending: FAMILY MEDICINE
Payer: COMMERCIAL

## 2025-08-29 DIAGNOSIS — L23.9 ALLERGIC DERMATITIS: ICD-10-CM

## 2025-08-29 DIAGNOSIS — E03.9 ACQUIRED HYPOTHYROIDISM: ICD-10-CM

## 2025-08-29 DIAGNOSIS — Z00.00 WELLNESS EXAMINATION: ICD-10-CM

## 2025-08-29 LAB
25(OH)D3 SERPL-MCNC: 56 NG/ML (ref 30–100)
ALBUMIN SERPL BCP-MCNC: 4.6 G/DL (ref 3.2–4.9)
ALBUMIN/GLOB SERPL: 1.5 G/DL
ALP SERPL-CCNC: 70 U/L (ref 30–99)
ALT SERPL-CCNC: 12 U/L (ref 2–50)
ANION GAP SERPL CALC-SCNC: 14 MMOL/L (ref 7–16)
AST SERPL-CCNC: 22 U/L (ref 12–45)
BASOPHILS # BLD AUTO: 0.5 % (ref 0–1.8)
BASOPHILS # BLD: 0.02 K/UL (ref 0–0.12)
BILIRUB SERPL-MCNC: 0.3 MG/DL (ref 0.1–1.5)
BUN SERPL-MCNC: 18 MG/DL (ref 8–22)
CALCIUM ALBUM COR SERPL-MCNC: 9 MG/DL (ref 8.5–10.5)
CALCIUM SERPL-MCNC: 9.5 MG/DL (ref 8.5–10.5)
CHLORIDE SERPL-SCNC: 104 MMOL/L (ref 96–112)
CHOLEST SERPL-MCNC: 223 MG/DL (ref 100–199)
CO2 SERPL-SCNC: 21 MMOL/L (ref 20–33)
CREAT SERPL-MCNC: 0.79 MG/DL (ref 0.5–1.4)
EOSINOPHIL # BLD AUTO: 0.14 K/UL (ref 0–0.51)
EOSINOPHIL NFR BLD: 3.8 % (ref 0–6.9)
ERYTHROCYTE [DISTWIDTH] IN BLOOD BY AUTOMATED COUNT: 45.7 FL (ref 35.9–50)
EST. AVERAGE GLUCOSE BLD GHB EST-MCNC: 114 MG/DL
GFR SERPLBLD CREATININE-BSD FMLA CKD-EPI: 88 ML/MIN/1.73 M 2
GLOBULIN SER CALC-MCNC: 3 G/DL (ref 1.9–3.5)
GLUCOSE SERPL-MCNC: 83 MG/DL (ref 65–99)
HBA1C MFR BLD: 5.6 % (ref 4–5.6)
HCT VFR BLD AUTO: 43.7 % (ref 37–47)
HDLC SERPL-MCNC: 74 MG/DL
HGB BLD-MCNC: 14.3 G/DL (ref 12–16)
IMM GRANULOCYTES # BLD AUTO: 0 K/UL (ref 0–0.11)
IMM GRANULOCYTES NFR BLD AUTO: 0 % (ref 0–0.9)
LDLC SERPL CALC-MCNC: 135 MG/DL
LYMPHOCYTES # BLD AUTO: 1.97 K/UL (ref 1–4.8)
LYMPHOCYTES NFR BLD: 53.7 % (ref 22–41)
MCH RBC QN AUTO: 30.6 PG (ref 27–33)
MCHC RBC AUTO-ENTMCNC: 32.7 G/DL (ref 32.2–35.5)
MCV RBC AUTO: 93.4 FL (ref 81.4–97.8)
MONOCYTES # BLD AUTO: 0.26 K/UL (ref 0–0.85)
MONOCYTES NFR BLD AUTO: 7.1 % (ref 0–13.4)
NEUTROPHILS # BLD AUTO: 1.28 K/UL (ref 1.82–7.42)
NEUTROPHILS NFR BLD: 34.9 % (ref 44–72)
NRBC # BLD AUTO: 0 K/UL
NRBC BLD-RTO: 0 /100 WBC (ref 0–0.2)
PLATELET # BLD AUTO: 253 K/UL (ref 164–446)
PMV BLD AUTO: 10.8 FL (ref 9–12.9)
POTASSIUM SERPL-SCNC: 4.2 MMOL/L (ref 3.6–5.5)
PROT SERPL-MCNC: 7.6 G/DL (ref 6–8.2)
RBC # BLD AUTO: 4.68 M/UL (ref 4.2–5.4)
SODIUM SERPL-SCNC: 139 MMOL/L (ref 135–145)
TRIGL SERPL-MCNC: 70 MG/DL (ref 0–149)
TSH SERPL DL<=0.005 MIU/L-ACNC: 4.52 UIU/ML (ref 0.38–5.33)
WBC # BLD AUTO: 3.7 K/UL (ref 4.8–10.8)

## 2025-08-29 PROCEDURE — 80053 COMPREHEN METABOLIC PANEL: CPT

## 2025-08-29 PROCEDURE — 84443 ASSAY THYROID STIM HORMONE: CPT

## 2025-08-29 PROCEDURE — 85025 COMPLETE CBC W/AUTO DIFF WBC: CPT

## 2025-08-29 PROCEDURE — 82306 VITAMIN D 25 HYDROXY: CPT

## 2025-08-29 PROCEDURE — 36415 COLL VENOUS BLD VENIPUNCTURE: CPT

## 2025-08-29 PROCEDURE — 86008 ALLG SPEC IGE RECOMB EA: CPT

## 2025-08-29 PROCEDURE — 80061 LIPID PANEL: CPT

## 2025-08-29 PROCEDURE — 83036 HEMOGLOBIN GLYCOSYLATED A1C: CPT
